# Patient Record
Sex: FEMALE | Race: WHITE | NOT HISPANIC OR LATINO | Employment: OTHER | URBAN - METROPOLITAN AREA
[De-identification: names, ages, dates, MRNs, and addresses within clinical notes are randomized per-mention and may not be internally consistent; named-entity substitution may affect disease eponyms.]

---

## 2017-01-17 ENCOUNTER — TELEPHONE (OUTPATIENT)
Dept: CARDIOLOGY | Facility: CLINIC | Age: 82
End: 2017-01-17

## 2017-01-17 NOTE — TELEPHONE ENCOUNTER
----- Message from Aniyah Juan sent at 1/16/2017 11:32 AM CST -----  Contact: Yuni Mathew - daughter      ----- Message -----     From: Mayte Nuñez     Sent: 1/16/2017   9:16 AM       To: [facility] Special Card Proc Clinical Support    Patient has a pacemaker appointment today that she needs to reschedule because there has been a death in the family.   Call daughter at [phone].                         sierra

## 2017-01-17 NOTE — TELEPHONE ENCOUNTER
Spoke with daughter, r/s MDT pacemaker check and 3 month follow-up visit to 1/23/2017 at [facility] beginning as 12:45 pm.  Daughter verbalized understanding, will check [facility] for further details.

## 2017-01-23 ENCOUNTER — OFFICE VISIT (OUTPATIENT)
Dept: CARDIOLOGY | Facility: CLINIC | Age: 82
End: 2017-01-23
Payer: MEDICARE

## 2017-01-23 ENCOUNTER — CLINICAL SUPPORT (OUTPATIENT)
Dept: CARDIOLOGY | Facility: CLINIC | Age: 82
End: 2017-01-23
Payer: MEDICARE

## 2017-01-23 VITALS
BODY MASS INDEX: 34.25 KG/M2 | HEIGHT: 64 IN | DIASTOLIC BLOOD PRESSURE: 60 MMHG | HEART RATE: 64 BPM | WEIGHT: 200.63 LBS | SYSTOLIC BLOOD PRESSURE: 164 MMHG

## 2017-01-23 DIAGNOSIS — I35.9 AORTIC VALVE DISORDER: Primary | ICD-10-CM

## 2017-01-23 DIAGNOSIS — I49.5 SINOATRIAL NODE DYSFUNCTION: ICD-10-CM

## 2017-01-23 DIAGNOSIS — I51.89 DIASTOLIC DYSFUNCTION: ICD-10-CM

## 2017-01-23 DIAGNOSIS — E78.5 HYPERLIPIDEMIA, UNSPECIFIED HYPERLIPIDEMIA TYPE: ICD-10-CM

## 2017-01-23 DIAGNOSIS — I27.20 PULMONARY HTN: ICD-10-CM

## 2017-01-23 DIAGNOSIS — Z95.0 CARDIAC PACEMAKER: ICD-10-CM

## 2017-01-23 DIAGNOSIS — R55 NEAR SYNCOPE: ICD-10-CM

## 2017-01-23 DIAGNOSIS — I10 ESSENTIAL HYPERTENSION: ICD-10-CM

## 2017-01-23 PROCEDURE — 3078F DIAST BP <80 MM HG: CPT | Mod: S$GLB,,, | Performed by: NURSE PRACTITIONER

## 2017-01-23 PROCEDURE — 99214 OFFICE O/P EST MOD 30 MIN: CPT | Mod: S$GLB,,, | Performed by: NURSE PRACTITIONER

## 2017-01-23 PROCEDURE — 1126F AMNT PAIN NOTED NONE PRSNT: CPT | Mod: S$GLB,,, | Performed by: NURSE PRACTITIONER

## 2017-01-23 PROCEDURE — 93280 PM DEVICE PROGR EVAL DUAL: CPT | Mod: 26,,, | Performed by: INTERNAL MEDICINE

## 2017-01-23 PROCEDURE — 1157F ADVNC CARE PLAN IN RCRD: CPT | Mod: S$GLB,,, | Performed by: NURSE PRACTITIONER

## 2017-01-23 PROCEDURE — 1159F MED LIST DOCD IN RCRD: CPT | Mod: S$GLB,,, | Performed by: NURSE PRACTITIONER

## 2017-01-23 PROCEDURE — 99999 PR PBB SHADOW E&M-EST. PATIENT-LVL III: CPT | Mod: PBBFAC,,, | Performed by: NURSE PRACTITIONER

## 2017-01-23 PROCEDURE — 99499 UNLISTED E&M SERVICE: CPT | Mod: S$GLB,,, | Performed by: NURSE PRACTITIONER

## 2017-01-23 PROCEDURE — 1160F RVW MEDS BY RX/DR IN RCRD: CPT | Mod: S$GLB,,, | Performed by: NURSE PRACTITIONER

## 2017-01-23 PROCEDURE — 3077F SYST BP >= 140 MM HG: CPT | Mod: S$GLB,,, | Performed by: NURSE PRACTITIONER

## 2017-01-23 NOTE — PROGRESS NOTES
Subjective:    Patient ID:  Diana Turner is a 83 y.o. female who presents for follow-up of Cardiac Valve Problem and Hypertension  Pacemaker    HPI   Ms Turner is 83 year old female with a PMhx of HTN, HLD, near syncope, SA node dysfunction, moderate to severe AS, PPM placement and CKD. She visits the clinic today for 3 month follow up. Patient has been doing well. Denies chest pain, shortness of breath, palpitations, syncope or dizziness. Vital signs are stable. Admitted to [facility] with near syncope 3 months ago. She has not experienced any more episode since that time. Vital signs are stable. PPM interrogated today. 2 D Echo on 9/11/2016 normal left ventricular systolic function (EF 55-60%), left ventricular diastolic dysfunction, moderate to severe AS and moderate AR.    Review of Systems   Constitution: Positive for weakness. Negative for chills, diaphoresis, fever, malaise/fatigue, weight gain and weight loss.   HENT: Positive for hearing loss. Negative for congestion and nosebleeds.    Eyes: Negative for blurred vision, double vision and pain.   Cardiovascular: Negative for chest pain, claudication, cyanosis, dyspnea on exertion, irregular heartbeat, leg swelling, near-syncope, orthopnea, palpitations, paroxysmal nocturnal dyspnea and syncope.   Respiratory: Negative for cough, hemoptysis, shortness of breath, sputum production and wheezing.    Endocrine: Negative for polyuria.   Hematologic/Lymphatic: Negative for bleeding problem. Does not bruise/bleed easily.   Skin: Negative for poor wound healing, rash and suspicious lesions.   Musculoskeletal: Negative for arthritis, back pain, falls, joint pain, joint swelling and neck pain.   Gastrointestinal: Negative for abdominal pain, heartburn, nausea and vomiting.   Genitourinary: Negative for bladder incontinence, dysuria, frequency and hematuria.   Neurological: Negative for difficulty with concentration, dizziness, focal weakness, light-headedness, loss of  balance, numbness, paresthesias, seizures, sensory change and tremors.   Psychiatric/Behavioral: Negative for depression, memory loss and substance abuse. The patient is not nervous/anxious.    Allergic/Immunologic: Negative for HIV exposure and hives.               Past Medical History   Diagnosis Date    Anemia     Aortic valve disorders     Arthritis     Benign hypertensive heart disease with heart failure(402.11)     Cardiac pacemaker 10/10/2013    Carotid artery occlusion     Chronic renal insufficiency, stage III (moderate) 10/10/2013    Depression     Hyperlipidemia     Hypertension     Kidney cysts      right US renal artery 9/2013    Obesity     Orbital mass     Pneumonia      as a child, required hospitalization    Right bundle branch block     Sinoatrial node dysfunction     Thyroid disease      goiter    Urinary incontinence      Past Surgical History   Procedure Laterality Date    Insert / replace / remove pacemaker  09/25/2008     [employer]TRONIC    Tonsillectomy      Appendectomy      Cholecystectomy      Knee surgery       right    Hysterectomy       RAHUL; benign reasons    Adenoidectomy      Hemorrhoid surgery       Family History   Problem Relation Age of Onset    Hypertension Mother     Kidney disease Mother     Heart disease Brother     Hyperlipidemia Brother     Hypertension Brother     Diabetes Cousin     Cancer Neg Hx     COPD Neg Hx     Stroke Neg Hx      Social History     Social History    Marital status:      Spouse name: N/A    Number of children: N/A    Years of education: N/A     Social History Main Topics    Smoking status: Never Smoker    Smokeless tobacco: Never Used    Alcohol use No    Drug use: No    Sexual activity: No     Other Topics Concern    Not on file     Social History Narrative     Review of patient's allergies indicates:   Allergen Reactions    Pravastatin Other (See Comments)    Sulfa (sulfonamide antibiotics) Swelling and  Other (See Comments)    Yeast, dried Other (See Comments)     Current Outpatient Prescriptions on File Prior to Visit   Medication Sig Dispense Refill    acetaminophen (TYLENOL) 500 MG tablet Take 500 mg by mouth every 6 (six) hours as needed.      allopurinol (ZYLOPRIM) 100 MG tablet TAKE 2 TABLETS (200 MG TOTAL) BY MOUTH ONCE DAILY. 180 tablet 3    aspirin 81 mg CpDR Take 1 tablet by mouth Daily.      atenolol (TENORMIN) 100 MG tablet Take 1 tablet (100 mg total) by mouth once daily. 90 tablet 3    calcitRIOL (ROCALTROL) 0.25 MCG Cap TAKE 1 CAPSULE (0.25 MCG TOTAL) BY MOUTH ONCE DAILY. 90 capsule 3    citalopram (CELEXA) 20 MG tablet Take 1 tablet (20 mg total) by mouth once daily. 30 tablet 6    diltiaZEM (CARDIZEM CD) 240 MG 24 hr capsule TAKE 1 CAPSULE BY MOUTH ONCE DAILY. 90 capsule 3    ezetimibe (ZETIA) 10 mg tablet Take 1 tablet (10 mg total) by mouth once daily. 90 tablet 3    furosemide (LASIX) 40 MG tablet TAKE 1 TABLET (40 MG TOTAL) BY MOUTH ONCE DAILY. 90 tablet 0    meclizine (ANTIVERT) 25 mg tablet TAKE 1 TABLET (25 MG TOTAL) BY MOUTH EVERY EVENING. 90 tablet 0    omega-3 fatty acids-vitamin E (FISH OIL) 1,000 mg Cap Take 1 capsule by mouth Twice daily.      senna (SENOKOT) 8.6 mg tablet Take 1 tablet by mouth once daily.       No current facility-administered medications on file prior to visit.      .  Objective:    Physical Exam   Constitutional: She is oriented to person, place, and time. She appears well-developed and well-nourished.   HENT:   Head: Normocephalic and atraumatic.   Eyes: Pupils are equal, round, and reactive to light.   Neck: Normal range of motion. Neck supple. No JVD present.   Cardiovascular: Exam reveals no gallop and no friction rub.    Murmur heard.   Harsh midsystolic murmur is present  at the upper right sternal border radiating to the neck  Pulmonary/Chest: Effort normal and breath sounds normal. No respiratory distress. She has no wheezes. She has no rales.    Abdominal: Soft. Bowel sounds are normal. She exhibits no distension and no mass. There is no tenderness. There is no rebound and no guarding.   Musculoskeletal: Normal range of motion. She exhibits no edema, tenderness or deformity.   Neurological: She is alert and oriented to person, place, and time.   Skin: Skin is warm and dry.   Psychiatric: She has a normal mood and affect. Her behavior is normal. Thought content normal.   Nursing note and vitals reviewed.    Lab Results   Component Value Date    CHOL 179 09/11/2016    CHOL 198 07/18/2016    CHOL 205 (H) 12/01/2015     Lab Results   Component Value Date    HDL 32 (L) 09/11/2016    HDL 42 07/18/2016    HDL 42 12/01/2015     Lab Results   Component Value Date    LDLCALC 129.4 09/11/2016    LDLCALC 141.0 07/18/2016    LDLCALC 145.8 12/01/2015     Lab Results   Component Value Date    TRIG 88 09/11/2016    TRIG 75 07/18/2016    TRIG 86 12/01/2015     Lab Results   Component Value Date    CHOLHDL 17.9 (L) 09/11/2016    CHOLHDL 21.2 07/18/2016    CHOLHDL 20.5 12/01/2015       Chemistry        Component Value Date/Time     09/11/2016 0332    K 4.2 09/11/2016 0332     09/11/2016 0332    CO2 26 09/11/2016 0332    BUN 31 (H) 09/11/2016 0332    CREATININE 1.6 (H) 09/11/2016 0332     09/11/2016 0332        Component Value Date/Time    CALCIUM 9.1 09/11/2016 0332    ALKPHOS 77 09/11/2016 0332    AST 14 09/11/2016 0332    ALT 12 09/11/2016 0332    BILITOT 0.3 09/11/2016 0332          Lab Results   Component Value Date    TSH 0.507 09/11/2016     Lab Results   Component Value Date    INR 1.0 09/10/2016    INR 1.0 12/13/2014    INR 1.1 12/02/2014     Lab Results   Component Value Date    WBC 7.81 09/11/2016    HGB 10.7 (L) 09/11/2016    HCT 32.1 (L) 09/11/2016     (H) 09/11/2016     09/11/2016         Assessment:       1. Aortic valve disorder    2. Pulmonary HTN    3. Near syncope    4. Essential hypertension    5. Hyperlipidemia,  unspecified hyperlipidemia type    6. Obesity, Class II, BMI 35-39.9, with comorbidity    7. Diastolic dysfunction    8. Cardiac pacemaker        Patient doing well. No sign of myocardial ischemia or decompensated CHF.  Vital signs area stable.   No syncope episodes over the last 3 months.     Plan:       Will continue current medications and treatment plan  Low sodium diet   Risk modification  Fall precautions   Folow up in clinic in 6 months with Dr Browne with CMP and Lipids before visit

## 2017-01-23 NOTE — MR AVS SNAPSHOT
[facility]  [address] [facility] [address]  Phone: [phone]  Fax: [phone]                  Diana Turner   2017 1:00 PM   Office Visit    Description:  Female : 1933   Provider:  Manpreet Birmingham NP   Department:  [facility]           Diagnoses this Visit        Comments    Hyperlipidemia, unspecified hyperlipidemia type    -  Primary     Pulmonary HTN         Near syncope         Essential hypertension         Aortic valve disorder         Obesity, Class II, BMI 35-39.9, with comorbidity         Diastolic dysfunction         Cardiac pacemaker                To Do List           Future Appointments        Provider Department Dept Phone    2017 3:00 PM PAC[facility]AKERCLINIC, [facility] Cardiology [phone]    2017 12:30 PM [facility] - Ophthalmology [phone]    2017 1:00 PM Mj Burton MD [facility] Ophthalmology [phone]      Goals (5 Years of Data)     None      Follow-Up and Disposition     Return in about 6 months (around 2017) for Dr Reed .      [facility] On Call     [facility] On Call Nurse Care Line - 24/7 Assistance  Registered nurses in the [facility] On Call Center provide clinical advisement, health education, appointment booking, and other advisory services.  Call for this free service at [phone].             Medications           Message regarding Medications     Verify the changes and/or additions to your medication regime listed below are the same as discussed with your clinician today.  If any of these changes or additions are incorrect, please notify your healthcare provider.             Verify that the below list of medications is an accurate representation of the medications you are currently taking.  If none reported, the list may be blank. If incorrect, please contact your healthcare provider. Carry this list with you in case of emergency.           Current  "Medications     acetaminophen (TYLENOL) 500 MG tablet Take 500 mg by mouth every 6 (six) hours as needed.    allopurinol (ZYLOPRIM) 100 MG tablet TAKE 2 TABLETS (200 MG TOTAL) BY MOUTH ONCE DAILY.    aspirin 81 mg CpDR Take 1 tablet by mouth Daily.    atenolol (TENORMIN) 100 MG tablet Take 1 tablet (100 mg total) by mouth once daily.    calcitRIOL (ROCALTROL) 0.25 MCG Cap TAKE 1 CAPSULE (0.25 MCG TOTAL) BY MOUTH ONCE DAILY.    citalopram (CELEXA) 20 MG tablet Take 1 tablet (20 mg total) by mouth once daily.    diltiaZEM (CARDIZEM CD) 240 MG 24 hr capsule TAKE 1 CAPSULE BY MOUTH ONCE DAILY.    ezetimibe (ZETIA) 10 mg tablet Take 1 tablet (10 mg total) by mouth once daily.    FLUTICASONE PROPIONATE (CLARISPRAY NASL) by Nasal route.    furosemide (LASIX) 40 MG tablet TAKE 1 TABLET (40 MG TOTAL) BY MOUTH ONCE DAILY.    meclizine (ANTIVERT) 25 mg tablet TAKE 1 TABLET (25 MG TOTAL) BY MOUTH EVERY EVENING.    omega-3 fatty acids-vitamin E (FISH OIL) 1,000 mg Cap Take 1 capsule by mouth Twice daily.    senna (SENOKOT) 8.6 mg tablet Take 1 tablet by mouth once daily.           Clinical Reference Information           Vital Signs - Last Recorded  Most recent update: 1/23/2017  1:20 PM by Manpreet Birmingham NP    BP Pulse Ht Wt BMI    (!) 164/60 64 5' 4" (1.626 m) 91 kg (200 lb 9.9 oz) 34.44 kg/m2      Blood Pressure          Most Recent Value    BP  (!)  164/60 [BP systolic >160, pts arm hurt too much to inflate cuff more]      Allergies as of 1/23/2017     Pravastatin    Sulfa (Sulfonamide Antibiotics)    Yeast, Dried      Immunizations Administered on Date of Encounter - 1/23/2017     None      Orders Placed During Today's Visit     Future Labs/Procedures Expected by Expires    Comprehensive metabolic panel  7/25/2017 (Approximate) 1/23/2018    Lipid panel  7/25/2017 (Approximate) 1/23/2018      "

## 2017-02-13 ENCOUNTER — OFFICE VISIT (OUTPATIENT)
Dept: URGENT CARE | Facility: CLINIC | Age: 82
End: 2017-02-13
Payer: MEDICARE

## 2017-02-13 VITALS
OXYGEN SATURATION: 96 % | HEART RATE: 69 BPM | BODY MASS INDEX: 33.34 KG/M2 | SYSTOLIC BLOOD PRESSURE: 170 MMHG | TEMPERATURE: 98 F | HEIGHT: 64 IN | DIASTOLIC BLOOD PRESSURE: 72 MMHG | WEIGHT: 195.31 LBS

## 2017-02-13 DIAGNOSIS — N39.0 UTI (URINARY TRACT INFECTION), UNCOMPLICATED: ICD-10-CM

## 2017-02-13 DIAGNOSIS — R30.0 DYSURIA: Primary | ICD-10-CM

## 2017-02-13 PROCEDURE — 99999 PR PBB SHADOW E&M-EST. PATIENT-LVL IV: CPT | Mod: PBBFAC,,,

## 2017-02-13 PROCEDURE — 1160F RVW MEDS BY RX/DR IN RCRD: CPT | Mod: S$GLB,,, | Performed by: NURSE PRACTITIONER

## 2017-02-13 PROCEDURE — 87077 CULTURE AEROBIC IDENTIFY: CPT

## 2017-02-13 PROCEDURE — 99213 OFFICE O/P EST LOW 20 MIN: CPT | Mod: S$GLB,,, | Performed by: NURSE PRACTITIONER

## 2017-02-13 PROCEDURE — 87186 SC STD MICRODIL/AGAR DIL: CPT

## 2017-02-13 PROCEDURE — 81000 URINALYSIS NONAUTO W/SCOPE: CPT

## 2017-02-13 PROCEDURE — 87086 URINE CULTURE/COLONY COUNT: CPT

## 2017-02-13 PROCEDURE — 87088 URINE BACTERIA CULTURE: CPT

## 2017-02-13 PROCEDURE — 3077F SYST BP >= 140 MM HG: CPT | Mod: S$GLB,,, | Performed by: NURSE PRACTITIONER

## 2017-02-13 PROCEDURE — 3078F DIAST BP <80 MM HG: CPT | Mod: S$GLB,,, | Performed by: NURSE PRACTITIONER

## 2017-02-13 PROCEDURE — 1157F ADVNC CARE PLAN IN RCRD: CPT | Mod: S$GLB,,, | Performed by: NURSE PRACTITIONER

## 2017-02-13 PROCEDURE — 1159F MED LIST DOCD IN RCRD: CPT | Mod: S$GLB,,, | Performed by: NURSE PRACTITIONER

## 2017-02-13 PROCEDURE — 1126F AMNT PAIN NOTED NONE PRSNT: CPT | Mod: S$GLB,,, | Performed by: NURSE PRACTITIONER

## 2017-02-13 PROCEDURE — 99499 UNLISTED E&M SERVICE: CPT | Mod: S$GLB,,, | Performed by: NURSE PRACTITIONER

## 2017-02-13 RX ORDER — CEPHALEXIN 250 MG/1
250 CAPSULE ORAL EVERY 12 HOURS
Qty: 6 CAPSULE | Refills: 0 | Status: SHIPPED | OUTPATIENT
Start: 2017-02-13 | End: 2017-02-16

## 2017-02-13 RX ORDER — PHENAZOPYRIDINE HYDROCHLORIDE 200 MG/1
200 TABLET, FILM COATED ORAL
Qty: 6 TABLET | Refills: 0 | Status: SHIPPED | OUTPATIENT
Start: 2017-02-13 | End: 2017-02-15

## 2017-02-13 NOTE — MR AVS SNAPSHOT
[facility] - Urgent Care  [address]  [facility] [address]  Phone: [phone]  Fax: [phone]                  Diana Turner   2017 2:20 PM   Office Visit    Description:  Female : 1933   Provider:  URGENT CARE, [facility]   Department:  [facility] - Urgent Care           Reason for Visit     Urinary Tract Infection           Diagnoses this Visit        Comments    Dysuria    -  Primary     UTI (urinary tract infection), uncomplicated                To Do List           Future Appointments        Provider Department Dept Phone    2017 1:30 PM [facility]AKER CLINIC, [facility] - Arrhythmia Procedures [phone]    2017 12:30 PM [facility]DS, [facility] - Ophthalmology [phone]    2017 1:00 PM Mj Burton MD [facility] Ophthalmology [phone]      Goals (5 Years of Data)     None       These Medications        Disp Refills Start End    cephALEXin (KEFLEX) 250 MG capsule 6 capsule 0 2017    Take 1 capsule (250 mg total) by mouth every 12 (twelve) hours. - Oral    Pharmacy: [facility]/pharmacy #[id number] - [place] [address] AT [facility] Ph #: [phone]       phenazopyridine (PYRIDIUM) 200 MG tablet 6 tablet 0 2017 2/15/2017    Take 1 tablet (200 mg total) by mouth 3 (three) times daily with meals. - Oral    Pharmacy: [facility]/pharmacy #[id number] - [place] [address] AT [facility] Ph #: [phone]         [facility] On Call     [facility] On Call Nurse Care Line -  Assistance  Registered nurses in the [facility] On Call Center provide clinical advisement, health education, appointment booking, and other advisory services.  Call for this free service at [phone].             Medications           Message regarding Medications     Verify the changes and/or additions to your medication regime listed below are the same as discussed with your  clinician today.  If any of these changes or additions are incorrect, please notify your healthcare provider.        START taking these NEW medications        Refills    cephALEXin (KEFLEX) 250 MG capsule 0    Sig: Take 1 capsule (250 mg total) by mouth every 12 (twelve) hours.    Class: Normal    Route: Oral    phenazopyridine (PYRIDIUM) 200 MG tablet 0    Sig: Take 1 tablet (200 mg total) by mouth 3 (three) times daily with meals.    Class: Normal    Route: Oral      STOP taking these medications     PHENAZOPYRIDINE HCL (AZO ORAL) Take by mouth.           Verify that the below list of medications is an accurate representation of the medications you are currently taking.  If none reported, the list may be blank. If incorrect, please contact your healthcare provider. Carry this list with you in case of emergency.           Current Medications     acetaminophen (TYLENOL) 500 MG tablet Take 500 mg by mouth every 6 (six) hours as needed.    allopurinol (ZYLOPRIM) 100 MG tablet TAKE 2 TABLETS (200 MG TOTAL) BY MOUTH ONCE DAILY.    aspirin 81 mg CpDR Take 1 tablet by mouth Daily.    atenolol (TENORMIN) 100 MG tablet Take 1 tablet (100 mg total) by mouth once daily.    calcitRIOL (ROCALTROL) 0.25 MCG Cap TAKE 1 CAPSULE (0.25 MCG TOTAL) BY MOUTH ONCE DAILY.    citalopram (CELEXA) 20 MG tablet Take 1 tablet (20 mg total) by mouth once daily.    diltiaZEM (CARDIZEM CD) 240 MG 24 hr capsule TAKE 1 CAPSULE BY MOUTH ONCE DAILY.    ezetimibe (ZETIA) 10 mg tablet Take 1 tablet (10 mg total) by mouth once daily.    FLUTICASONE PROPIONATE (CLARISPRAY NASL) by Nasal route.    furosemide (LASIX) 40 MG tablet TAKE 1 TABLET (40 MG TOTAL) BY MOUTH ONCE DAILY.    meclizine (ANTIVERT) 25 mg tablet TAKE 1 TABLET (25 MG TOTAL) BY MOUTH EVERY EVENING.    omega-3 fatty acids-vitamin E (FISH OIL) 1,000 mg Cap Take 1 capsule by mouth Twice daily.    senna (SENOKOT) 8.6 mg tablet Take 1 tablet by mouth once daily.    cephALEXin (KEFLEX) 250 MG  "capsule Take 1 capsule (250 mg total) by mouth every 12 (twelve) hours.    phenazopyridine (PYRIDIUM) 200 MG tablet Take 1 tablet (200 mg total) by mouth 3 (three) times daily with meals.           Clinical Reference Information           Your Vitals Were     BP Pulse Temp Height    170/72 (BP Location: Right arm, Patient Position: Sitting, BP Method: Manual) 69 97.8 °F (36.6 °C) (Tympanic) 5' 4" (1.626 m)    Weight SpO2 BMI    88.6 kg (195 lb 5.2 oz) 96% 33.53 kg/m2      Blood Pressure          Most Recent Value    BP  (!)  170/72      Allergies as of 2/13/2017     Pravastatin    Sulfa (Sulfonamide Antibiotics)    Yeast, Dried      Immunizations Administered on Date of Encounter - 2/13/2017     None      Orders Placed During Today's Visit      Normal Orders This Visit    CULTURE, URINE     Urinalysis       Instructions    Plan:  Lab work  UA, C&S   Consult Nephrology  Advise increase p.o. fluids--of water/juice & rest  Meds: Keflex and Pyridium  Practice good handwashing.  See PCP or go to ER if nausea, vomiting, fever, increased back pain, or fail to improve with treatment.  AVS provided and reviewed with patient including supportive care, follow up, and red flag symptoms.   Patient verbalizes understanding and agrees with treatment plan. Discharged from Urgent Care in stable condition.         Language Assistance Services     ATTENTION: Language assistance services are available, free of charge. Please call [phone].      ATENCIÓN: Si habla español, tiene a reid disposición servicios gratuitos de asistencia lingüística. Llame al [phone].     PETER Ý: N?u b?n nói Ti?ng Vi?t, có các d?ch v? h? tr? ngôn ng? mi?n phí dành cho b?n. G?i s? [phone].         [facility] complies with applicable Federal civil rights laws and does not discriminate on the basis of race, color, national origin, age, disability, or sex.        "

## 2017-02-13 NOTE — PROGRESS NOTES
Chief complaint/reason for visit:  dysuria, urinary frequency    History of present illness:  83-year-old female with daughter complains of dysuria, urinary frequency & abdominal pain onset 3  days ago.   Patient denies any back pain, nausea, vomiting, fever & vaginal d/c.  Patient tried over-the-counter medications with little relief. Daughter admits patient seen couple of weeks ago for UTI. Daughter admits Nephrologist/ Dr Benson was going to place patient on Keflex 6 months ago. Discussed with patient & daughter not able to detect UTI with urine test stripe due to OTC AZO.       Past Medical History   Diagnosis Date    Anemia     Aortic valve disorders     Arthritis     Benign hypertensive heart disease with heart failure(402.11)     Cardiac pacemaker 10/10/2013    Carotid artery occlusion     Chronic renal insufficiency, stage III (moderate) 10/10/2013    Depression     Hyperlipidemia     Hypertension     Kidney cysts      right US renal artery 9/2013    Obesity     Orbital mass     Pneumonia      as a child, required hospitalization    Right bundle branch block     Sinoatrial node dysfunction     Thyroid disease      goiter    Urinary incontinence        Past Surgical History   Procedure Laterality Date    Insert / replace / remove pacemaker  09/25/2008     [employer]    Tonsillectomy      Appendectomy      Cholecystectomy      Knee surgery       right    Hysterectomy       RAHUL; benign reasons    Adenoidectomy      Hemorrhoid surgery         Social History     Social History    Marital status:      Spouse name: N/A    Number of children: N/A    Years of education: N/A     Occupational History    Not on file.     Social History Main Topics    Smoking status: Never Smoker    Smokeless tobacco: Never Used    Alcohol use No    Drug use: No    Sexual activity: No     Other Topics Concern    Not on file     Social History Narrative         ROS:  Gen.: Denies fatigue, weight  loss  Skin:  Denies  no rashes, itching or changes in skin color or texture  HEENT: Denies headache, nasal congestion, sneezing  Nodes: No masses or lesions  Chest: Denies cyanosis, wheezing, cough and sputum production  Cardiovascular: Denies chest pain, shortness of breath  Abdomen: Reports slight abdominal pain  Urinary: Reports dysuria   Musculoskeletal: no joint stiffness, swelling. Denies back pain  Neurologic: History of seizures, paralysis, alteration of gait or coordination  Psychiatric: Denies mood swings, depression or suicidal    PE:  Appearance: Well-nourished, well-developed, in mild distress, [place]  Skin: No masses, rashes or lesions  HEENT: turbinates pink, Mucus membranes okay, TMs clear bilateral   Chest: Lungs clear to auscultation.  Cardiovascular: Regular rate and rhythm.  Abdomen: Soft with minimal supra pubic tenderness, with active bowel sounds, no CVA tenderness  Musculoskeletal: Motor: 5/5 strength major flexors/extensors.  Neurologic: No sensory deficits. Gait and posture: ambulates slowly, No cerebellar signs.   Mental status: Patient awake, alert and oriented    Plan:  Lab work  UA, C&S   Consult Nephrology  Advise increase p.o. fluids--of water/juice & rest  Meds: Keflex and Pyridium  Practice good handwashing.  See PCP or go to ER if nausea, vomiting, fever, increased back pain, or fail to improve with treatment.  AVS provided and reviewed with patient including supportive care, follow up, and red flag symptoms.   Patient verbalizes understanding and agrees with treatment plan. Discharged from Urgent Care in stable condition.    Diagnosis:  Dysuria / UTI  Hypertension  Chronic kidney disease

## 2017-02-13 NOTE — PATIENT INSTRUCTIONS
Plan:  Lab work  UA, C&S   Consult Nephrology  Advise increase p.o. fluids--of water/juice & rest  Meds: Keflex and Pyridium  Practice good handwashing.  See PCP or go to ER if nausea, vomiting, fever, increased back pain, or fail to improve with treatment.  AVS provided and reviewed with patient including supportive care, follow up, and red flag symptoms.   Patient verbalizes understanding and agrees with treatment plan. Discharged from Urgent Care in stable condition.

## 2017-02-14 LAB
BACTERIA #/AREA URNS HPF: ABNORMAL /HPF
BILIRUB UR QL STRIP: ABNORMAL
CLARITY UR: CLEAR
COLOR UR: YELLOW
GLUCOSE UR QL STRIP: ABNORMAL
HGB UR QL STRIP: ABNORMAL
HYALINE CASTS #/AREA URNS LPF: 0 /LPF
KETONES UR QL STRIP: NEGATIVE
LEUKOCYTE ESTERASE UR QL STRIP: ABNORMAL
MICROSCOPIC COMMENT: ABNORMAL
NITRITE UR QL STRIP: POSITIVE
PH UR STRIP: 6 [PH] (ref 5–8)
PROT UR QL STRIP: ABNORMAL
RBC #/AREA URNS HPF: 5 /HPF (ref 0–4)
SP GR UR STRIP: 1.01 (ref 1–1.03)
SQUAMOUS #/AREA URNS HPF: 3 /HPF
URN SPEC COLLECT METH UR: ABNORMAL
UROBILINOGEN UR STRIP-ACNC: ABNORMAL EU/DL
WBC #/AREA URNS HPF: >100 /HPF (ref 0–5)
WBC CLUMPS URNS QL MICRO: ABNORMAL

## 2017-02-16 ENCOUNTER — PATIENT MESSAGE (OUTPATIENT)
Dept: NEPHROLOGY | Facility: CLINIC | Age: 82
End: 2017-02-16

## 2017-02-17 LAB — BACTERIA UR CULT: NORMAL

## 2017-02-21 RX ORDER — CALCITRIOL 0.25 UG/1
CAPSULE ORAL
Qty: 90 CAPSULE | Refills: 2 | Status: SHIPPED | OUTPATIENT
Start: 2017-02-21 | End: 2017-03-21 | Stop reason: SDUPTHER

## 2017-02-21 RX ORDER — ALLOPURINOL 100 MG/1
TABLET ORAL
Qty: 180 TABLET | Refills: 0 | Status: SHIPPED | OUTPATIENT
Start: 2017-02-21 | End: 2017-06-22 | Stop reason: SDUPTHER

## 2017-02-26 ENCOUNTER — TELEPHONE (OUTPATIENT)
Dept: URGENT CARE | Facility: CLINIC | Age: 82
End: 2017-02-26

## 2017-02-26 NOTE — TELEPHONE ENCOUNTER
----- Message from Elli Galindo NP sent at 2/24/2017  3:46 PM CST -----  Advise urine culture shows E Coli, need to see Nephrology

## 2017-02-26 NOTE — TELEPHONE ENCOUNTER
Called and spoke to pt's daughter regarding the UA Culture results. She stated that the pt is feeling a little better. Instructed for her to keep her appt with Nephrology. She stated an understanding.

## 2017-03-13 ENCOUNTER — LAB VISIT (OUTPATIENT)
Dept: LAB | Facility: HOSPITAL | Age: 82
End: 2017-03-13
Attending: INTERNAL MEDICINE
Payer: MEDICARE

## 2017-03-13 DIAGNOSIS — E87.5 HYPERKALEMIA: ICD-10-CM

## 2017-03-13 DIAGNOSIS — R60.0 LOCALIZED EDEMA: ICD-10-CM

## 2017-03-13 DIAGNOSIS — E21.3 HPTH (HYPERPARATHYROIDISM): ICD-10-CM

## 2017-03-13 DIAGNOSIS — N18.30 CKD (CHRONIC KIDNEY DISEASE) STAGE 3, GFR 30-59 ML/MIN: ICD-10-CM

## 2017-03-13 DIAGNOSIS — I10 ESSENTIAL HYPERTENSION: ICD-10-CM

## 2017-03-13 DIAGNOSIS — D63.1 ANEMIA ASSOCIATED WITH STAGE 3 CHRONIC RENAL FAILURE: ICD-10-CM

## 2017-03-13 DIAGNOSIS — M1A.30X0 CHRONIC GOUT DUE TO RENAL IMPAIRMENT WITHOUT TOPHUS, UNSPECIFIED SITE: ICD-10-CM

## 2017-03-13 DIAGNOSIS — N18.30 ANEMIA ASSOCIATED WITH STAGE 3 CHRONIC RENAL FAILURE: ICD-10-CM

## 2017-03-13 DIAGNOSIS — N39.0 RECURRENT UTI: ICD-10-CM

## 2017-03-13 LAB

## 2017-03-13 PROCEDURE — 84156 ASSAY OF PROTEIN URINE: CPT

## 2017-03-13 PROCEDURE — 81003 URINALYSIS AUTO W/O SCOPE: CPT

## 2017-03-14 LAB
CREAT UR-MCNC: 103 MG/DL
PROT UR-MCNC: 15 MG/DL
PROT/CREAT RATIO, UR: 0.15

## 2017-03-21 RX ORDER — CALCITRIOL 0.25 UG/1
CAPSULE ORAL
Qty: 90 CAPSULE | Refills: 3 | Status: SHIPPED | OUTPATIENT
Start: 2017-03-21

## 2017-04-19 ENCOUNTER — OFFICE VISIT (OUTPATIENT)
Dept: NEPHROLOGY | Facility: CLINIC | Age: 82
End: 2017-04-19
Payer: MEDICARE

## 2017-04-19 VITALS
DIASTOLIC BLOOD PRESSURE: 52 MMHG | SYSTOLIC BLOOD PRESSURE: 140 MMHG | HEIGHT: 64 IN | HEART RATE: 80 BPM | BODY MASS INDEX: 33.27 KG/M2 | WEIGHT: 194.88 LBS

## 2017-04-19 DIAGNOSIS — E87.5 HYPERKALEMIA: ICD-10-CM

## 2017-04-19 DIAGNOSIS — N18.30 ANEMIA ASSOCIATED WITH STAGE 3 CHRONIC RENAL FAILURE: ICD-10-CM

## 2017-04-19 DIAGNOSIS — N18.9 ANEMIA ASSOCIATED WITH CHRONIC RENAL FAILURE: ICD-10-CM

## 2017-04-19 DIAGNOSIS — M1A.30X0 CHRONIC GOUT DUE TO RENAL IMPAIRMENT WITHOUT TOPHUS, UNSPECIFIED SITE: ICD-10-CM

## 2017-04-19 DIAGNOSIS — I10 ESSENTIAL HYPERTENSION: ICD-10-CM

## 2017-04-19 DIAGNOSIS — N39.0 RECURRENT UTI: ICD-10-CM

## 2017-04-19 DIAGNOSIS — D63.1 ANEMIA ASSOCIATED WITH CHRONIC RENAL FAILURE: ICD-10-CM

## 2017-04-19 DIAGNOSIS — D63.1 ANEMIA ASSOCIATED WITH STAGE 3 CHRONIC RENAL FAILURE: ICD-10-CM

## 2017-04-19 DIAGNOSIS — N18.30 CKD (CHRONIC KIDNEY DISEASE) STAGE 3, GFR 30-59 ML/MIN: ICD-10-CM

## 2017-04-19 DIAGNOSIS — E21.3 HPTH (HYPERPARATHYROIDISM): ICD-10-CM

## 2017-04-19 DIAGNOSIS — R60.0 LOCALIZED EDEMA: ICD-10-CM

## 2017-04-19 DIAGNOSIS — N18.4 CKD (CHRONIC KIDNEY DISEASE), STAGE 4 (SEVERE): Primary | ICD-10-CM

## 2017-04-19 PROCEDURE — 99214 OFFICE O/P EST MOD 30 MIN: CPT | Mod: S$GLB,,, | Performed by: INTERNAL MEDICINE

## 2017-04-19 PROCEDURE — 1160F RVW MEDS BY RX/DR IN RCRD: CPT | Mod: S$GLB,,, | Performed by: INTERNAL MEDICINE

## 2017-04-19 PROCEDURE — 1126F AMNT PAIN NOTED NONE PRSNT: CPT | Mod: S$GLB,,, | Performed by: INTERNAL MEDICINE

## 2017-04-19 PROCEDURE — 3077F SYST BP >= 140 MM HG: CPT | Mod: S$GLB,,, | Performed by: INTERNAL MEDICINE

## 2017-04-19 PROCEDURE — 99999 PR PBB SHADOW E&M-EST. PATIENT-LVL III: CPT | Mod: PBBFAC,,, | Performed by: INTERNAL MEDICINE

## 2017-04-19 PROCEDURE — 1159F MED LIST DOCD IN RCRD: CPT | Mod: S$GLB,,, | Performed by: INTERNAL MEDICINE

## 2017-04-19 PROCEDURE — 99499 UNLISTED E&M SERVICE: CPT | Mod: S$GLB,,, | Performed by: INTERNAL MEDICINE

## 2017-04-19 PROCEDURE — 3078F DIAST BP <80 MM HG: CPT | Mod: S$GLB,,, | Performed by: INTERNAL MEDICINE

## 2017-04-19 RX ORDER — DIPHENHYDRAMINE HCL 25 MG
25 CAPSULE ORAL
Status: ACTIVE | OUTPATIENT
Start: 2017-04-19

## 2017-04-19 RX ORDER — DIPHENHYDRAMINE HCL 25 MG
25 CAPSULE ORAL
Status: CANCELLED
Start: 2017-04-19 | End: 2017-04-19

## 2017-04-19 RX ORDER — SODIUM CHLORIDE 0.9 % (FLUSH) 0.9 %
10 SYRINGE (ML) INJECTION
Status: CANCELLED | OUTPATIENT
Start: 2017-04-19

## 2017-04-19 RX ORDER — SODIUM CHLORIDE 0.9 % (FLUSH) 0.9 %
10 SYRINGE (ML) INJECTION
Status: ACTIVE | OUTPATIENT
Start: 2017-04-19

## 2017-04-19 RX ORDER — HEPARIN 100 UNIT/ML
500 SYRINGE INTRAVENOUS
Status: DISCONTINUED | OUTPATIENT
Start: 2017-04-19 | End: 2018-08-30

## 2017-04-19 NOTE — MR AVS SNAPSHOT
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 tophus, unspecified site         HPTH (hyperparathyroidism)         Hyperkalemia         Recurrent UTI         Anemia associated with chronic renal failure         CKD (chronic kidney disease) stage 3, GFR 30-59 ml/min         Anemia associated with stage 3 chronic renal failure                To Do List           Future Appointments        Provider Department Dept Phone    2017 1:30 PM [facility] Arr[facility]ia Procedures [phone]    2017 12:30 PM [facility] - Ophthalmology [phone]    2017 1:00 PM Mj Burton MD [facility] Ophthalmology [phone]      Goals (5 Years of Data)     None      Follow-Up and Disposition     Return in about 6 months (around 10/19/2017).    Follow-up and Disposition History      [facility]     [facility] -  Assistance  Unless otherwise directed by your provider, please contact [facility], our nurse care line that is available for  assistance.     Registered nurses in the [facility] provide: appointment scheduling, clinical advisement, health education, and other advisory services.  Call: [phone] (toll free)               Medications           Message regarding Medications     Verify the changes and/or additions to your medication regime listed below are the same  as discussed with your clinician today.  If any of these changes or additions are incorrect, please notify your healthcare provider.        These medications were administered today        Dose Freq    heparin, porcine (PF) 100 unit/mL injection flush 500 Units 500 Units As needed (PRN)    Sig: Inject 5 mLs (500 Units total) into the vein as needed (Flush lines as needed.).    Class: Normal    Route: Intravenous    sodium chloride 0.9% flush 10 mL 10 mL As needed (PRN)    Sig: Inject 10 mLs into the vein as needed for Line Care.    Class: Normal    Route: Intravenous    diphenhydrAMINE capsule 25 mg 25 mg Clinic/HOD 1 time    Sig: Take 1 each (25 mg total) by mouth one time.    Class: Normal    Route: Oral    ferumoxytol (FERAHEME) 510 mg in dextrose 5 % 100 mL IVPB 510 mg Once    Sig: Inject 510 mg into the vein once.    Class: Normal    Route: Intravenous    Non-formulary Exception Code: Defer to pharmacy           Verify that the below list of medications is an accurate representation of the medications you are currently taking.  If none reported, the list may be blank. If incorrect, please contact your healthcare provider. Carry this list with you in case of emergency.           Current Medications     acetaminophen (TYLENOL) 500 MG tablet Take 500 mg by mouth every 6 (six) hours as needed.    allopurinol (ZYLOPRIM) 100 MG tablet TAKE 2 TABLETS (200 MG TOTAL) BY MOUTH ONCE DAILY.    aspirin 81 mg CpDR Take 1 tablet by mouth Daily.    atenolol (TENORMIN) 100 MG tablet Take 1 tablet (100 mg total) by mouth once daily.    calcitRIOL (ROCALTROL) 0.25 MCG Cap TAKE 1 CAPSULE (0.25 MCG TOTAL) BY MOUTH ONCE DAILY.    citalopram (CELEXA) 20 MG tablet Take 1 tablet (20 mg total) by mouth once daily.    diltiaZEM (CARDIZEM CD) 240 MG 24 hr capsule TAKE 1 CAPSULE BY MOUTH ONCE DAILY.    ezetimibe (ZETIA) 10 mg tablet Take 1 tablet (10 mg total) by mouth once daily.    FLUTICASONE PROPIONATE (CLARISPRAY NASL) by Nasal route.  "   furosemide (LASIX) 40 MG tablet TAKE 1 TABLET (40 MG TOTAL) BY MOUTH ONCE DAILY.    meclizine (ANTIVERT) 25 mg tablet TAKE 1 TABLET (25 MG TOTAL) BY MOUTH EVERY EVENING.    omega-3 fatty acids-vitamin E (FISH OIL) 1,000 mg Cap Take 1 capsule by mouth Twice daily.    senna (SENOKOT) 8.6 mg tablet Take 1 tablet by mouth once daily.           Clinical Reference Information           Your Vitals Were     BP Pulse Height Weight BMI    140/52 80 5' 4" (1.626 m) 88.4 kg (194 lb 14.2 oz) 33.45 kg/m2      Blood Pressure          Most Recent Value    BP  (!)  140/52      Allergies as of 4/19/2017     Pravastatin    Sulfa (Sulfonamide Antibiotics)    Yeast, Dried      Immunizations Administered on Date of Encounter - 4/19/2017     None      Instructions    .  Chronic kidney disease stage IV: avoid nonsteroidals; hypertensive nephropathy; stable ; GFR 24%; vitamin D level is 31, PTH level is normal    2.  Hypertension much better controlled at home needs more monitoring at home     3.  Edema with hyponatremia: stable on Lasix    4.  Anemia: s/p  one dose of IV iron; reorder IV iron     5.  Right Tennis Elbow tendonitis: no more steroids ( made her have psychosis )    6.  Chronic gout status post acute flareup in 2014 : uric acid is better last time checked was in July 2016; allopurinol  200 mg once a day    7. Arthritis : no NSAIDS ; try DMSO with or without OTC hydrocortisone 1%       8. Anxiety : watch for drowsiness and while driving ; watch on celexa ( started 2014 )     9. UTI :  recurrent ; CB juice and other instructions given ; recheck UA and consider prophylactic Antibiotics if needed ; if she has anymore serious UTI will place her on cipro alternating with Keflex prophylactic antibiotics ; no serious UTI in the last 2 year       Language Assistance Services     ATTENTION: Language assistance services are available, free of charge. Please call [phone].      ATENCIÓN: Si maurisio spence a reid disposición " servicios gratuitos de asistencia lingüística. Fermín guzman [phone].     PETER Ý: N?u b?n nói Ti?ng Vi?t, có các d?ch v? h? tr? ngôn ng? mi?n phí dành cho b?n. G?i s? [phone].         [facility] complies with applicable Federal civil rights laws and does not discriminate on the basis of race, color, national origin, age, disability, or sex.

## 2017-04-19 NOTE — PROGRESS NOTES
"Subjective:       Patient ID: Diana Turner is a 83 y.o. White female who presents for follow up  evaluation of Chronic Kidney Disease and hyperparathyroid of renal origin    Anemia   There has been no abdominal pain, bruising/bleeding easily, confusion, fever, light-headedness or palpitations.   Hypertension   Pertinent negatives include no chest pain, headaches, neck pain, palpitations or shortness of breath.   Edema   Pertinent negatives include no abdominal pain, arthralgias, chest pain, chills, coughing, diaphoresis, fatigue, fever, headaches, joint swelling, nausea, neck pain, numbness, rash, vomiting or weakness.   Patient is an 83-year-old female with long-standing history of hypertension and hypertensive nephropathy baseline creatinine ranging between 1.4 and 1.6 for the last 6 years;In 2008 she had hyperkalemia due to Aldactone and ACE inhibitor combination;  she has not seen any nephrologist in last 5 years; today she comes for an evaluation; In the past he has had multiple renal ultrasound which showed chronic medical disease never had any proteinuria; negative workup for renal artery stenosis; She had a high blood pressure and the cardiologist started her on Diovan;     Had taken celebrex for 10 years and none since 2007 ;     S/p BCCA right forearm surgery     3/2014  visit her HCTZ and Lasix were stopped and started on demadex 20 mg but she had polyuria and Na dropped to 129 and she was dehdrated and demadex was stopped       Has legs swelling L>R    4/2014 ALso had a gout flare up in left big toe s/p prednisone     12/2014 to 3/2014 had depression with FTT ; off clonazepam and now better with celexa     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   HENT: Negative for ear discharge, hearing loss and postnasal drip.    Eyes: Negative for visual disturbance.   Respiratory: Negative for apnea, cough, chest tightness, shortness of breath, wheezing and stridor.    Cardiovascular: Negative for chest pain, palpitations and leg swelling.   Gastrointestinal: Negative for abdominal distention, abdominal pain, blood in stool, diarrhea, nausea and vomiting.   Genitourinary: Negative for decreased urine volume, difficulty urinating, dyspareunia, dysuria, enuresis, flank pain, frequency, genital sores, hematuria, pelvic pain, urgency and vaginal discharge.   Musculoskeletal: Negative for arthralgias, back pain, gait problem, joint swelling, neck pain and neck stiffness.   Skin: Negative for color change and rash.   Neurological: Negative for dizziness, tremors, seizures, syncope, weakness, light-headedness, numbness and headaches.   Hematological: Does not bruise/bleed easily.   Psychiatric/Behavioral: Negative for agitation, confusion, sleep disturbance and suicidal ideas.       Objective:       Vitals:    04/19/17 1258   BP: (!) 140/52   Pulse: 80   Weight: 88.4 kg (194 lb 14.2 oz)   Height: 5' 4" (1.626 m)       Physical Exam      Constitutional: She is oriented to person, place, and time. She appears well-developed and well-nourished.   HENT:   Head: Normocephalic and atraumatic.   Eyes: Conjunctivae and EOM are normal. Pupils are equal, round, and reactive to light.   Neck: Normal range of motion and full passive range of motion without pain. Neck supple. Carotid bruit is not present. No edema present. No thyroid mass and no thyromegaly present.   Cardiovascular: Normal rate, regular rhythm, S1 normal, S2 normal, intact distal pulses and normal pulses.  PMI is not " displaced.  Exam reveals no friction rub.    Murmur heard.   Systolic murmur is present with a grade of 2/6   Pacemaker in place   Pulmonary/Chest: Effort normal and breath sounds normal. No accessory muscle usage. No respiratory distress. She has no wheezes. She has no rales. She exhibits no tenderness.   Abdominal: Soft. Bowel sounds are normal. She exhibits no distension and no mass. There is no hepatosplenomegaly. There is no tenderness. There is no rebound and no CVA tenderness. No hernia.   Musculoskeletal: She exhibits edema. She exhibits no tenderness.        Right wrist: She exhibits decreased range of motion.   Tendonitis is better     Basal cell ca    Neurological: She is alert and oriented to person, place, and time. She has normal reflexes. No cranial nerve deficit or sensory deficit. She exhibits normal muscle tone. Coordination normal.   Walks with a cane   Skin: Skin is warm and dry. No bruising, no ecchymosis and no rash noted. No cyanosis or erythema. No pallor. Nails show no clubbing.   Psychiatric: She has a normal mood and affect. Her speech is normal and behavior is normal. Judgment and thought content normal.       Lab Results   Component Value Date    CREATININE 1.6 (H) 03/13/2017    BUN 24 (H) 03/13/2017     03/13/2017    K 4.3 03/13/2017     03/13/2017    CO2 25 03/13/2017     Lab Results   Component Value Date    PTH 37.0 07/18/2016    CALCIUM 9.1 03/13/2017    PHOS 4.4 03/13/2017     Lab Results   Component Value Date    URICACID 5.4 07/18/2016     Lab Results   Component Value Date    WBC 6.27 03/13/2017    HGB 11.1 (L) 03/13/2017    HCT 33.4 (L) 03/13/2017     (H) 03/13/2017     03/13/2017   iron sats 17 %     Urine protein 0.1 G /24 hours     Vitamin D level is 31    Assessment:       1. CKD (chronic kidney disease), stage 4 (severe)    2. Localized edema    3. Essential hypertension    4. 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 HPTH (hyperparathyroidism)    6. Hyperkalemia    7. Recurrent UTI    8. Anemia associated with chronic renal failure        Plan:        1.  Chronic kidney disease stage IV: avoid nonsteroidals; hypertensive nephropathy; stable ; GFR 24%; vitamin D level is 31, PTH level is normal    2.  Hypertension much better controlled at home needs more monitoring at home     3.  Edema with hyponatremia: stable on Lasix    4.  Anemia: s/p  one dose of IV iron; reorder IV iron     5.  Right Tennis Elbow tendonitis: no more steroids ( made her have psychosis )    6.  Chronic gout status post acute flareup in 2014 : uric acid is better last time checked was in July 2016; allopurinol  200 mg once a day    7. Arthritis : no NSAIDS ; try DMSO with or without OTC hydrocortisone 1%       8. Anxiety : watch for drowsiness and while driving ; watch on celexa ( started 2014 )     9. UTI :  recurrent ; CB juice and other instructions given ; recheck UA and consider prophylactic Antibiotics if needed ; if she has anymore serious UTI will place her on cipro alternating with Keflex prophylactic antibiotics ; no serious UTI in the last 2 year    followup 6 months      Kevan Benson MD

## 2017-04-19 NOTE — PATIENT INSTRUCTIONS
.  Chronic kidney disease stage IV: avoid nonsteroidals; hypertensive nephropathy; stable ; GFR 24%; vitamin D level is 31, PTH level is normal    2.  Hypertension much better controlled at home needs more monitoring at home     3.  Edema with hyponatremia: stable on Lasix    4.  Anemia: s/p  one dose of IV iron; reorder IV iron     5.  Right Tennis Elbow tendonitis: no more steroids ( made her have psychosis )    6.  Chronic gout status post acute flareup in 2014 : uric acid is better last time checked was in July 2016; allopurinol  200 mg once a day    7. Arthritis : no NSAIDS ; try DMSO with or without OTC hydrocortisone 1%       8. Anxiety : watch for drowsiness and while driving ; watch on celexa ( started 2014 )     9. UTI :  recurrent ; CB juice and other instructions given ; recheck UA and consider prophylactic Antibiotics if needed ; if she has anymore serious UTI will place her on cipro alternating with Keflex prophylactic antibiotics ; no serious UTI in the last 2 year

## 2017-06-01 ENCOUNTER — INFUSION (OUTPATIENT)
Dept: INFUSION THERAPY | Facility: HOSPITAL | Age: 82
End: 2017-06-01
Attending: INTERNAL MEDICINE
Payer: MEDICARE

## 2017-06-01 VITALS
HEIGHT: 64 IN | SYSTOLIC BLOOD PRESSURE: 153 MMHG | HEART RATE: 59 BPM | DIASTOLIC BLOOD PRESSURE: 59 MMHG | RESPIRATION RATE: 16 BRPM | WEIGHT: 194.88 LBS | BODY MASS INDEX: 33.27 KG/M2 | TEMPERATURE: 99 F

## 2017-06-01 DIAGNOSIS — E87.1 HYPONATREMIA: ICD-10-CM

## 2017-06-01 DIAGNOSIS — I45.10 RIGHT BUNDLE BRANCH BLOCK: ICD-10-CM

## 2017-06-01 DIAGNOSIS — R79.89 ELEVATED TROPONIN: ICD-10-CM

## 2017-06-01 DIAGNOSIS — Z95.0 CARDIAC PACEMAKER: ICD-10-CM

## 2017-06-01 DIAGNOSIS — I49.5 SINOATRIAL NODE DYSFUNCTION: ICD-10-CM

## 2017-06-01 DIAGNOSIS — E21.3 HPTH (HYPERPARATHYROIDISM): ICD-10-CM

## 2017-06-01 DIAGNOSIS — E78.5 HYPERLIPIDEMIA, UNSPECIFIED HYPERLIPIDEMIA TYPE: ICD-10-CM

## 2017-06-01 DIAGNOSIS — I35.9 AORTIC VALVE DISORDER: ICD-10-CM

## 2017-06-01 DIAGNOSIS — N18.30 CKD (CHRONIC KIDNEY DISEASE) STAGE 3, GFR 30-59 ML/MIN: ICD-10-CM

## 2017-06-01 DIAGNOSIS — R60.0 LOCALIZED EDEMA: ICD-10-CM

## 2017-06-01 DIAGNOSIS — D63.1 ANEMIA ASSOCIATED WITH CHRONIC RENAL FAILURE: ICD-10-CM

## 2017-06-01 DIAGNOSIS — D63.1 ANEMIA ASSOCIATED WITH STAGE 3 CHRONIC RENAL FAILURE: ICD-10-CM

## 2017-06-01 DIAGNOSIS — D49.89 ORBITAL TUMOR: ICD-10-CM

## 2017-06-01 DIAGNOSIS — I10 ESSENTIAL HYPERTENSION: ICD-10-CM

## 2017-06-01 DIAGNOSIS — N18.30 ANEMIA ASSOCIATED WITH STAGE 3 CHRONIC RENAL FAILURE: ICD-10-CM

## 2017-06-01 DIAGNOSIS — I65.23 BILATERAL CAROTID ARTERY STENOSIS: ICD-10-CM

## 2017-06-01 DIAGNOSIS — R55 NEAR SYNCOPE: ICD-10-CM

## 2017-06-01 DIAGNOSIS — I51.89 DIASTOLIC DYSFUNCTION: ICD-10-CM

## 2017-06-01 DIAGNOSIS — N18.9 ANEMIA ASSOCIATED WITH CHRONIC RENAL FAILURE: ICD-10-CM

## 2017-06-01 DIAGNOSIS — H47.093 DISORDER OF OPTIC NERVE OF BOTH EYES: ICD-10-CM

## 2017-06-01 DIAGNOSIS — I11.0 BENIGN HYPERTENSIVE HEART DISEASE WITH HEART FAILURE: ICD-10-CM

## 2017-06-01 DIAGNOSIS — M19.90 ARTHRITIS: ICD-10-CM

## 2017-06-01 DIAGNOSIS — H52.7 REFRACTIVE ERROR: Primary | ICD-10-CM

## 2017-06-01 DIAGNOSIS — I27.20 PULMONARY HTN: ICD-10-CM

## 2017-06-01 PROCEDURE — 96365 THER/PROPH/DIAG IV INF INIT: CPT | Mod: PO

## 2017-06-01 PROCEDURE — 63600175 PHARM REV CODE 636 W HCPCS: Mod: PO | Performed by: INTERNAL MEDICINE

## 2017-06-01 PROCEDURE — 25000003 PHARM REV CODE 250: Mod: PO | Performed by: INTERNAL MEDICINE

## 2017-06-01 RX ORDER — DIPHENHYDRAMINE HCL 25 MG
25 CAPSULE ORAL
Status: CANCELLED
Start: 2017-06-01 | End: 2017-06-01

## 2017-06-01 RX ORDER — SODIUM CHLORIDE 0.9 % (FLUSH) 0.9 %
10 SYRINGE (ML) INJECTION
Status: CANCELLED | OUTPATIENT
Start: 2017-06-01

## 2017-06-01 RX ADMIN — FERUMOXYTOL 510 MG: 510 INJECTION INTRAVENOUS at 02:06

## 2017-06-01 NOTE — PLAN OF CARE
"Problem: Patient Care Overview  Goal: Plan of Care Review  Outcome: Ongoing (interventions implemented as appropriate)  Pt states feeling "tired".      "

## 2017-06-01 NOTE — PATIENT INSTRUCTIONS
[facility]  [address]  [address] [address]  [phone] phone     [phone] fax  Hours of Operation: Monday- Friday 8:00am- 5:00pm  After hours phone  [phone]  Hematology / Oncology Physicians on call      Dr. Imtiaz Mcgee    Please call with any concerns regarding your appointment today.      ANEMIA, Iron Deficiency [Adult]  Anemia is a condition where the size or number of red blood cells in the body is reduced. Iron is needed in the diet to make red cells. The red blood cells carry oxygen to all parts of the body. Anemia limits the delivery of oxygen to where it is needed. This causes a feeling of being tired and run down. When anemia becomes severe, the skin becomes pale and there is shortness of breath with exertion, headaches, dizziness, drowsiness and fatigue.  The cause of your anemia is lack of iron in your body. This may occur due to blood loss (for example, heavy menstrual periods or bleeding from the stomach or intestines) or a poor diet (not eating enough iron-containing foods), inability to absorb iron from your diet, or pregnancy.  If the blood count is low enough, an IRON SUPPLEMENT will be prescribed. It usually takes about 2-3 months of treatment with iron supplements to correct an anemia. Severe cases of anemia requires a blood transfusion to rapidly correct symptoms and deliver more oxygen to the cells.  HOME CARE:  1) Increase the iron stores in your body by eating foods high in iron content. This is a natural way of building your blood cells back up again. Beef, liver, spinach and other dark green leafy vegetables, whole grain products, beans and nuts are all natural sources of iron.  2) If you are having symptoms of anemia listed above:  -- Do not overexert yourself.  -- Talk to your doctor before flying on an airplane or travelling to high altitudes.  FOLLOW UP with your doctor in 2 months for a  repeat red blood cell count, or as recommended by our staff, to be sure that the anemia has been corrected.  GET PROMPT MEDICAL ATTENTION if any of the following occur or worsen:  -- Shortness of breath or chest pain  -- Dizziness or fainting  -- Vomiting blood or passing red or black-colored stool  © 2000-2011 [facility], [address] [place] [address]. All rights reserved. This information is not intended as a substitute for professional medical care. Always follow your healthcare professional's instructions.    FALL PREVENTION   Falls often occur due to slipping, tripping or losing your balance. Here are ways to reduce your risk of falling again.   Was there anything that caused your fall that can be fixed, removed or replaced?   Make your home safe by keeping walkways clear of objects you may trip over.   Use non-slip pads under rugs.   Do not walk in poorly lit areas.   Do not stand on chairs or wobbly ladders.   Use caution when reaching overhead or looking upward. This position can cause a loss of balance.   Be sure your shoes fit properly, have non-slip bottoms and are in good condition.   Be cautious when going up and down stairs, curbs, and when walking on uneven sidewalks.   If your balance is poor, consider using a cane or walker.   If your fall was related to alcohol use, stop or limit alcohol intake.   If your fall was related to use of sleeping medicines, talk to your doctor about this. You may need to reduce your dosage at bedtime if you awaken during the night to go to the bathroom.   To reduce the need for nighttime bathroom trips:   Avoid drinking fluids for several hours before going to bed   Empty your bladder before going to bed   Men can keep a urinal at the bedside   © 2000-2011 [facility], [address] [place] [address]. All rights reserved. This information is not intended as a substitute for professional medical care. Always follow your healthcare  professional's instructions.

## 2017-06-22 RX ORDER — ALLOPURINOL 100 MG/1
TABLET ORAL
Qty: 180 TABLET | Refills: 0 | Status: SHIPPED | OUTPATIENT
Start: 2017-06-22 | End: 2017-12-15 | Stop reason: SDUPTHER

## 2017-06-28 DIAGNOSIS — Z95.0 CARDIAC PACEMAKER IN SITU: Primary | ICD-10-CM

## 2017-06-28 DIAGNOSIS — I49.5 SINOATRIAL NODE DYSFUNCTION: ICD-10-CM

## 2017-07-06 ENCOUNTER — PATIENT OUTREACH (OUTPATIENT)
Dept: ADMINISTRATIVE | Facility: HOSPITAL | Age: 82
End: 2017-07-06

## 2017-07-06 ENCOUNTER — PATIENT MESSAGE (OUTPATIENT)
Dept: CARDIOLOGY | Facility: CLINIC | Age: 82
End: 2017-07-06

## 2017-07-06 DIAGNOSIS — I10 UNSPECIFIED ESSENTIAL HYPERTENSION: Primary | ICD-10-CM

## 2017-07-06 NOTE — PROGRESS NOTES
Schedule patient for annual exam on 08/02/17. Patient's daughter in agreement and vocalize understanding.

## 2017-07-24 ENCOUNTER — PATIENT MESSAGE (OUTPATIENT)
Dept: INTERNAL MEDICINE | Facility: CLINIC | Age: 82
End: 2017-07-24

## 2017-07-24 RX ORDER — ALLOPURINOL 100 MG/1
TABLET ORAL
Qty: 180 TABLET | Refills: 0 | Status: SHIPPED | OUTPATIENT
Start: 2017-07-24 | End: 2017-08-02

## 2017-07-25 ENCOUNTER — OFFICE VISIT (OUTPATIENT)
Dept: URGENT CARE | Facility: CLINIC | Age: 82
End: 2017-07-25
Payer: MEDICARE

## 2017-07-25 VITALS
WEIGHT: 193.44 LBS | SYSTOLIC BLOOD PRESSURE: 112 MMHG | OXYGEN SATURATION: 97 % | HEIGHT: 64 IN | BODY MASS INDEX: 33.02 KG/M2 | DIASTOLIC BLOOD PRESSURE: 70 MMHG | TEMPERATURE: 98 F | HEART RATE: 61 BPM

## 2017-07-25 DIAGNOSIS — N39.0 ACUTE UTI: Primary | ICD-10-CM

## 2017-07-25 LAB
BILIRUB SERPL-MCNC: NORMAL MG/DL
BLOOD URINE, POC: NEGATIVE
COLOR, POC UA: YELLOW
GLUCOSE UR QL STRIP: NORMAL
KETONES UR QL STRIP: NORMAL
LEUKOCYTE ESTERASE URINE, POC: NEGATIVE
NITRITE, POC UA: NORMAL
PH, POC UA: 5
PROTEIN, POC: NEGATIVE
SPECIFIC GRAVITY, POC UA: 1.02
UROBILINOGEN, POC UA: NORMAL

## 2017-07-25 PROCEDURE — 1126F AMNT PAIN NOTED NONE PRSNT: CPT | Mod: S$GLB,,, | Performed by: NURSE PRACTITIONER

## 2017-07-25 PROCEDURE — 99999 PR PBB SHADOW E&M-EST. PATIENT-LVL IV: CPT | Mod: PBBFAC,,, | Performed by: NURSE PRACTITIONER

## 2017-07-25 PROCEDURE — 87086 URINE CULTURE/COLONY COUNT: CPT

## 2017-07-25 PROCEDURE — 81002 URINALYSIS NONAUTO W/O SCOPE: CPT | Mod: S$GLB,,, | Performed by: NURSE PRACTITIONER

## 2017-07-25 PROCEDURE — 99214 OFFICE O/P EST MOD 30 MIN: CPT | Mod: 25,S$GLB,, | Performed by: NURSE PRACTITIONER

## 2017-07-25 PROCEDURE — 1159F MED LIST DOCD IN RCRD: CPT | Mod: S$GLB,,, | Performed by: NURSE PRACTITIONER

## 2017-07-25 RX ORDER — DOXYCYCLINE 100 MG/1
100 CAPSULE ORAL EVERY 12 HOURS
Qty: 14 CAPSULE | Refills: 0 | Status: SHIPPED | OUTPATIENT
Start: 2017-07-25 | End: 2017-08-02

## 2017-07-25 NOTE — PROGRESS NOTES
Subjective:       Patient ID: Diana Turner is a 83 y.o. female.    Chief Complaint: Urinary Retention and Altered Mental Status    HPI given by patient and daughter      Urinary Tract Infection    This is a new problem. The current episode started in the past 7 days. There has been no fever. Associated symptoms include hesitancy. Pertinent negatives include no chills, flank pain, frequency, hematuria, nausea, urgency or vomiting.     Review of Systems   Constitutional: Negative for chills, diaphoresis, fatigue and fever.   Respiratory: Negative.    Gastrointestinal: Negative for nausea and vomiting.   Genitourinary: Positive for hesitancy. Negative for difficulty urinating, dysuria, flank pain, frequency, hematuria and urgency.   Musculoskeletal: Negative for back pain and myalgias.   Neurological: Negative for dizziness and weakness.   Psychiatric/Behavioral: Positive for confusion.       Objective:      Physical Exam   Constitutional: She is oriented to person, place, and time. She appears well-developed and well-nourished. No distress.   Neurological: She is alert and oriented to person, place, and time.   Skin: Skin is warm and dry. She is not diaphoretic.       Assessment:       1. Acute UTI        Plan:   Diana was seen today for urinary retention and altered mental status.    Diagnoses and all orders for this visit:    Acute UTI  -     POCT urine dipstick without microscope  -     Urine culture  -     doxycycline (VIBRAMYCIN) 100 MG Cap; Take 1 capsule (100 mg total) by mouth every 12 (twelve) hours. Note to Pharmacy: Can substitute for Monodox if needed      -     Diagnosis and treatment discussed, AVS provided  -     Follow up with PCP or ER immediately for worsening, new or no improvement of symptoms.   -     Patient/daughter understands and agrees with plan

## 2017-07-25 NOTE — PATIENT INSTRUCTIONS

## 2017-07-27 LAB
BACTERIA UR CULT: NORMAL
BACTERIA UR CULT: NORMAL

## 2017-08-02 ENCOUNTER — OFFICE VISIT (OUTPATIENT)
Dept: INTERNAL MEDICINE | Facility: CLINIC | Age: 82
End: 2017-08-02
Payer: MEDICARE

## 2017-08-02 VITALS
WEIGHT: 193.56 LBS | BODY MASS INDEX: 33.05 KG/M2 | HEART RATE: 86 BPM | DIASTOLIC BLOOD PRESSURE: 74 MMHG | HEIGHT: 64 IN | OXYGEN SATURATION: 95 % | TEMPERATURE: 98 F | SYSTOLIC BLOOD PRESSURE: 124 MMHG

## 2017-08-02 DIAGNOSIS — M89.9 DISORDER OF BONE: ICD-10-CM

## 2017-08-02 DIAGNOSIS — E66.9 OBESITY (BMI 30.0-34.9): ICD-10-CM

## 2017-08-02 DIAGNOSIS — I10 ESSENTIAL HYPERTENSION: ICD-10-CM

## 2017-08-02 DIAGNOSIS — N18.30 CKD (CHRONIC KIDNEY DISEASE), STAGE III: ICD-10-CM

## 2017-08-02 DIAGNOSIS — F33.9 MAJOR DEPRESSION, RECURRENT, CHRONIC: Primary | ICD-10-CM

## 2017-08-02 DIAGNOSIS — E78.5 HYPERLIPIDEMIA LDL GOAL <130: ICD-10-CM

## 2017-08-02 PROCEDURE — 99499 UNLISTED E&M SERVICE: CPT | Mod: S$GLB,,, | Performed by: INTERNAL MEDICINE

## 2017-08-02 PROCEDURE — 99214 OFFICE O/P EST MOD 30 MIN: CPT | Mod: S$GLB,,, | Performed by: INTERNAL MEDICINE

## 2017-08-02 PROCEDURE — 1159F MED LIST DOCD IN RCRD: CPT | Mod: S$GLB,,, | Performed by: INTERNAL MEDICINE

## 2017-08-02 PROCEDURE — 99999 PR PBB SHADOW E&M-EST. PATIENT-LVL III: CPT | Mod: PBBFAC,,, | Performed by: INTERNAL MEDICINE
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
## 2017-08-10 ENCOUNTER — HOSPITAL ENCOUNTER (OUTPATIENT)
Dept: RADIOLOGY | Facility: HOSPITAL | Age: 82
Discharge: HOME | End: 2017-08-10
Attending: INTERNAL MEDICINE
Payer: MEDICARE

## 2017-08-10 ENCOUNTER — PATIENT MESSAGE (OUTPATIENT)
Dept: ADMINISTRATIVE | Facility: OTHER | Age: 82
End: 2017-08-10

## 2017-08-10 ENCOUNTER — CLINICAL SUPPORT (OUTPATIENT)
Dept: CARDIOLOGY | Facility: CLINIC | Age: 82
End: 2017-08-10
Payer: MEDICARE

## 2017-08-10 ENCOUNTER — OFFICE VISIT (OUTPATIENT)
Dept: CARDIOLOGY | Facility: CLINIC | Age: 82
End: 2017-08-10
Payer: MEDICARE

## 2017-08-10 VITALS
SYSTOLIC BLOOD PRESSURE: 126 MMHG | HEIGHT: 64 IN | HEART RATE: 84 BPM | WEIGHT: 193 LBS | DIASTOLIC BLOOD PRESSURE: 70 MMHG | BODY MASS INDEX: 32.95 KG/M2

## 2017-08-10 DIAGNOSIS — I10 ESSENTIAL HYPERTENSION: ICD-10-CM

## 2017-08-10 DIAGNOSIS — I35.9 AORTIC VALVE DISORDER: ICD-10-CM

## 2017-08-10 DIAGNOSIS — I49.5 SINOATRIAL NODE DYSFUNCTION: ICD-10-CM

## 2017-08-10 DIAGNOSIS — I49.5 SSS (SICK SINUS SYNDROME): Primary | ICD-10-CM

## 2017-08-10 DIAGNOSIS — I65.23 BILATERAL CAROTID ARTERY STENOSIS: ICD-10-CM

## 2017-08-10 DIAGNOSIS — N18.9 ANEMIA ASSOCIATED WITH CHRONIC RENAL FAILURE: ICD-10-CM

## 2017-08-10 DIAGNOSIS — Z95.0 CARDIAC PACEMAKER: ICD-10-CM

## 2017-08-10 DIAGNOSIS — I45.10 RIGHT BUNDLE BRANCH BLOCK: ICD-10-CM

## 2017-08-10 DIAGNOSIS — D49.89 ORBITAL TUMOR: ICD-10-CM

## 2017-08-10 DIAGNOSIS — E87.1 HYPONATREMIA: ICD-10-CM

## 2017-08-10 DIAGNOSIS — D63.1 ANEMIA ASSOCIATED WITH CHRONIC RENAL FAILURE: ICD-10-CM

## 2017-08-10 DIAGNOSIS — I49.5 SSS (SICK SINUS SYNDROME): ICD-10-CM

## 2017-08-10 DIAGNOSIS — N18.30 CKD (CHRONIC KIDNEY DISEASE) STAGE 3, GFR 30-59 ML/MIN: ICD-10-CM

## 2017-08-10 DIAGNOSIS — Z95.0 CARDIAC PACEMAKER IN SITU: ICD-10-CM

## 2017-08-10 DIAGNOSIS — E78.5 HYPERLIPIDEMIA, UNSPECIFIED HYPERLIPIDEMIA TYPE: ICD-10-CM

## 2017-08-10 DIAGNOSIS — I27.20 PULMONARY HTN: ICD-10-CM

## 2017-08-10 DIAGNOSIS — I51.89 DIASTOLIC DYSFUNCTION: ICD-10-CM

## 2017-08-10 PROCEDURE — 3078F DIAST BP <80 MM HG: CPT | Mod: S$GLB,,, | Performed by: INTERNAL MEDICINE

## 2017-08-10 PROCEDURE — 71020 XR CHEST PA AND LATERAL: CPT | Mod: TC

## 2017-08-10 PROCEDURE — 99499 UNLISTED E&M SERVICE: CPT | Mod: S$GLB,,, | Performed by: INTERNAL MEDICINE

## 2017-08-10 PROCEDURE — 93280 PM DEVICE PROGR EVAL DUAL: CPT | Mod: 26,,, | Performed by: INTERNAL MEDICINE

## 2017-08-10 PROCEDURE — 3074F SYST BP LT 130 MM HG: CPT | Mod: S$GLB,,, | Performed by: INTERNAL MEDICINE

## 2017-08-10 PROCEDURE — 1159F MED LIST DOCD IN RCRD: CPT | Mod: S$GLB,,, | Performed by: INTERNAL MEDICINE

## 2017-08-10 PROCEDURE — 99214 OFFICE O/P EST MOD 30 MIN: CPT | Mod: S$GLB,,, | Performed by: INTERNAL MEDICINE

## 2017-08-10 PROCEDURE — 71020 XR CHEST PA AND LATERAL: CPT | Mod: 26,,, | Performed by: RADIOLOGY

## 2017-08-10 PROCEDURE — 3008F BODY MASS INDEX DOCD: CPT | Mod: S$GLB,,, | Performed by: INTERNAL MEDICINE

## 2017-08-10 PROCEDURE — 99999 PR PBB SHADOW E&M-EST. PATIENT-LVL III: CPT | Mod: PBBFAC,,, | Performed by: INTERNAL MEDICINE

## 2017-08-10 PROCEDURE — 93000 ELECTROCARDIOGRAM COMPLETE: CPT | Mod: S$GLB,,, | Performed by: NUCLEAR MEDICINE
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
## 2017-08-11 ENCOUNTER — TELEPHONE (OUTPATIENT)
Dept: CARDIOLOGY | Facility: CLINIC | Age: 82
End: 2017-08-11

## 2017-08-21 ENCOUNTER — SURGERY (OUTPATIENT)
Age: 82
End: 2017-08-21

## 2017-08-21 ENCOUNTER — HOSPITAL ENCOUNTER (OUTPATIENT)
Facility: HOSPITAL | Age: 82
Discharge: HOME | End: 2017-08-21
Attending: INTERNAL MEDICINE | Admitting: INTERNAL MEDICINE
Payer: MEDICARE

## 2017-08-21 VITALS
HEART RATE: 62 BPM | TEMPERATURE: 98 F | HEIGHT: 64 IN | RESPIRATION RATE: 19 BRPM | DIASTOLIC BLOOD PRESSURE: 48 MMHG | WEIGHT: 194 LBS | BODY MASS INDEX: 33.12 KG/M2 | OXYGEN SATURATION: 98 % | SYSTOLIC BLOOD PRESSURE: 127 MMHG

## 2017-08-21 DIAGNOSIS — Z45.010 PACEMAKER AT END OF BATTERY LIFE: ICD-10-CM

## 2017-08-21 DIAGNOSIS — I44.2 ATRIOVENTRICULAR BLOCK, COMPLETE: ICD-10-CM

## 2017-08-21 DIAGNOSIS — I65.29 OCCLUSION AND STENOSIS OF UNSPECIFIED CAROTID ARTERY: ICD-10-CM

## 2017-08-21 LAB
ANION GAP SERPL CALC-SCNC: 12 MMOL/L
APTT BLDCRRT: 28.5 SEC
BASOPHILS # BLD AUTO: 0.03 K/UL
BASOPHILS NFR BLD: 0.5 %
BUN SERPL-MCNC: 20 MG/DL
CALCIUM SERPL-MCNC: 9.5 MG/DL
CHLORIDE SERPL-SCNC: 103 MMOL/L
CO2 SERPL-SCNC: 25 MMOL/L
CREAT SERPL-MCNC: 1.5 MG/DL
DIFFERENTIAL METHOD: ABNORMAL
EOSINOPHIL # BLD AUTO: 0.3 K/UL
EOSINOPHIL NFR BLD: 5.2 %
ERYTHROCYTE [DISTWIDTH] IN BLOOD BY AUTOMATED COUNT: 14.4 %
EST. GFR  (AFRICAN AMERICAN): 37 ML/MIN/1.73 M^2
EST. GFR  (NON AFRICAN AMERICAN): 32 ML/MIN/1.73 M^2
GLUCOSE SERPL-MCNC: 89 MG/DL
HCT VFR BLD AUTO: 36.3 %
HGB BLD-MCNC: 12.2 G/DL
LYMPHOCYTES # BLD AUTO: 1.4 K/UL
LYMPHOCYTES NFR BLD: 21.8 %
MCH RBC QN AUTO: 36.5 PG
MCHC RBC AUTO-ENTMCNC: 33.6 G/DL
MCV RBC AUTO: 109 FL
MONOCYTES # BLD AUTO: 0.8 K/UL
MONOCYTES NFR BLD: 12.1 %
NEUTROPHILS # BLD AUTO: 3.8 K/UL
NEUTROPHILS NFR BLD: 60.4 %
PLATELET # BLD AUTO: 216 K/UL
PMV BLD AUTO: 10.5 FL
POTASSIUM SERPL-SCNC: 4.2 MMOL/L
RBC # BLD AUTO: 3.34 M/UL
SODIUM SERPL-SCNC: 140 MMOL/L
WBC # BLD AUTO: 6.2 K/UL

## 2017-08-21 PROCEDURE — A4216 STERILE WATER/SALINE, 10 ML: HCPCS

## 2017-08-21 PROCEDURE — 99152 MOD SED SAME PHYS/QHP 5/>YRS: CPT | Mod: ,,, | Performed by: INTERNAL MEDICINE

## 2017-08-21 PROCEDURE — 63600175 PHARM REV CODE 636 W HCPCS

## 2017-08-21 PROCEDURE — 85730 THROMBOPLASTIN TIME PARTIAL: CPT

## 2017-08-21 PROCEDURE — 93010 ELECTROCARDIOGRAM REPORT: CPT | Mod: ,,, | Performed by: INTERNAL MEDICINE

## 2017-08-21 PROCEDURE — 25000003 PHARM REV CODE 250

## 2017-08-21 PROCEDURE — 93005 ELECTROCARDIOGRAM TRACING: CPT | Mod: 59

## 2017-08-21 PROCEDURE — 27100006 EP LAB PROCEDURE

## 2017-08-21 PROCEDURE — 85025 COMPLETE CBC W/AUTO DIFF WBC: CPT

## 2017-08-21 PROCEDURE — 80048 BASIC METABOLIC PNL TOTAL CA: CPT

## 2017-08-21 PROCEDURE — 33228 REMV&REPLC PM GEN DUAL LEAD: CPT | Mod: ,,, | Performed by: INTERNAL MEDICINE

## 2017-08-21 RX ORDER — CEFAZOLIN SODIUM 2 G/50ML
2 SOLUTION INTRAVENOUS ONCE
Status: COMPLETED | OUTPATIENT
Start: 2017-08-21 | End: 2017-08-21

## 2017-08-21 RX ORDER — ACETAMINOPHEN 325 MG/1
650 TABLET ORAL EVERY 8 HOURS PRN
Status: DISCONTINUED | OUTPATIENT
Start: 2017-08-21 | End: 2017-08-21 | Stop reason: HOSPADM

## 2017-08-21 RX ORDER — CEFAZOLIN SODIUM 1 G/50ML
1 SOLUTION INTRAVENOUS ONCE
Status: COMPLETED | OUTPATIENT
Start: 2017-08-21 | End: 2017-08-21

## 2017-08-21 RX ADMIN — CEFAZOLIN SODIUM 2 G: 2 SOLUTION INTRAVENOUS at 07:08

## 2017-08-21 RX ADMIN — CEFAZOLIN SODIUM 1 G: 1 SOLUTION INTRAVENOUS at 11:08

## 2017-08-21 NOTE — BRIEF OP NOTE
<[facility]  Surgery Department  Operative Note    SUMMARY     Date of Procedure: 8/21/2017     Procedure: Procedure(s) (LRB):  REPLACEMENT-GENERATOR-PACEMAKER (Left)     Surgeon(s) and Role:     * Lupillo Flahetry MD - Primary    Assisting Surgeon: None    Pre-Operative Diagnosis: CHB (complete heart block) [I44.2]    Post-Operative Diagnosis: Post-Op Diagnosis Codes:     * CHB (complete heart block) [I44.2]    Anesthesia: RN IV Sedation    Technical Procedures Used: PPM generator change    Description of the Findings of the Procedure: PPM generator cgange via L subclavian- [employer]    Significant Surgical Tasks Conducted by the Assistant(s), if Applicable: none    Complications: No    Estimated Blood Loss (EBL): < 50 cc           Implants: * No implants in log *    Specimens:   Specimen (12h ago through future)    None                  Condition: Good    Disposition: PACU - hemodynamically stable.    Attestation: I was present and scrubbed for the entire procedure.

## 2017-08-21 NOTE — PROGRESS NOTES
Discharge paperwork given to daughter. Pt not in any distress at this time. Pt taken out per wheelchair and driven home per daughter at bedside.

## 2017-08-21 NOTE — H&P
Consult Note  Cardiology    Consult Requested By:  Reason for Consult:     SUBJECTIVE:     History of Present Illness:  Patient is a 83 y.o. female presents with PPM ELSA.  Pt here for generator change.    Review of patient's allergies indicates:   Allergen Reactions    Pravastatin Other (See Comments)    Sulfa (sulfonamide antibiotics) Swelling and Other (See Comments)    Yeast, dried Other (See Comments)       Past Medical History:   Diagnosis Date    Anemia     Aortic valve disorders     Arthritis     Benign hyperten heart disease w/ CHF     Cardiac pacemaker 10/10/2013    Carotid artery occlusion     Chronic renal insufficiency, stage III (moderate) 10/10/2013    Depression     Hyperlipidemia     Hypertension     Kidney cysts     right US renal artery 9/2013    Obesity     Orbital mass     Pneumonia     as a child, required hospitalization    Right bundle branch block     Sinoatrial node dysfunction     Thyroid disease     goiter    Urinary incontinence      Past Surgical History:   Procedure Laterality Date    ADENOIDECTOMY      APPENDECTOMY      CHOLECYSTECTOMY      HEMORRHOID SURGERY      HYSTERECTOMY      RAHUL; benign reasons    INSERT / REPLACE / REMOVE PACEMAKER  09/25/2008    [employer]    KNEE SURGERY      right    TONSILLECTOMY       Family History   Problem Relation Age of Onset    Hypertension Mother     Kidney disease Mother     Heart disease Brother     Hyperlipidemia Brother     Hypertension Brother     Diabetes Cousin     Cancer Neg Hx     COPD Neg Hx     Stroke Neg Hx      Social History   Substance Use Topics    Smoking status: Never Smoker    Smokeless tobacco: Never Used    Alcohol use No        Review of Systems:  Constitutional: negative  Eyes: negative  Ears, nose, mouth, throat, and face: negative  Respiratory: negative  Cardiovascular: negative  Gastrointestinal: negative  Genitourinary:negative  Hematologic/lymphatic:  negative  Musculoskeletal:negative,   Neurological: negative  Behavioral/Psych: negative  Endocrine: negative  Allergic/Immunologic: negative    OBJECTIVE:     Vital Signs (Most Recent)       Vital Signs Range (Last 24H):       Current Facility-Administered Medications   Medication    diphenhydrAMINE capsule 25 mg    ferumoxytol (FERAHEME) 510 mg in dextrose 5 % 100 mL IVPB    heparin, porcine (PF) 100 unit/mL injection flush 500 Units    sodium chloride 0.9% flush 10 mL     Current Outpatient Prescriptions   Medication Sig    acetaminophen (TYLENOL) 500 MG tablet Take 500 mg by mouth every 6 (six) hours as needed.    allopurinol (ZYLOPRIM) 100 MG tablet TAKE 2 TABLETS BY MOUTH ONCE DAILY.    aspirin 81 mg CpDR Take 1 tablet by mouth Daily.    atenolol (TENORMIN) 100 MG tablet Take 1 tablet (100 mg total) by mouth once daily.    calcitRIOL (ROCALTROL) 0.25 MCG Cap TAKE 1 CAPSULE (0.25 MCG TOTAL) BY MOUTH ONCE DAILY.    citalopram (CELEXA) 20 MG tablet Take 1 tablet (20 mg total) by mouth once daily.    diltiaZEM (CARDIZEM CD) 240 MG 24 hr capsule TAKE 1 CAPSULE BY MOUTH ONCE DAILY.    ezetimibe (ZETIA) 10 mg tablet Take 1 tablet (10 mg total) by mouth once daily.    FLUTICASONE PROPIONATE (CLARISPRAY NASL) by Nasal route.    furosemide (LASIX) 40 MG tablet TAKE 1 TABLET (40 MG TOTAL) BY MOUTH ONCE DAILY.    meclizine (ANTIVERT) 25 mg tablet TAKE 1 TABLET (25 MG TOTAL) BY MOUTH EVERY EVENING.    omega-3 fatty acids-vitamin E (FISH OIL) 1,000 mg Cap Take 1 capsule by mouth Twice daily.    senna (SENOKOT) 8.6 mg tablet Take 1 tablet by mouth once daily.     Current Facility-Administered Medications on File Prior to Encounter   Medication    diphenhydrAMINE capsule 25 mg    ferumoxytol (FERAHEME) 510 mg in dextrose 5 % 100 mL IVPB    heparin, porcine (PF) 100 unit/mL injection flush 500 Units    sodium chloride 0.9% flush 10 mL     Current Outpatient Prescriptions on File Prior to Encounter    Medication Sig    acetaminophen (TYLENOL) 500 MG tablet Take 500 mg by mouth every 6 (six) hours as needed.    allopurinol (ZYLOPRIM) 100 MG tablet TAKE 2 TABLETS BY MOUTH ONCE DAILY.    aspirin 81 mg CpDR Take 1 tablet by mouth Daily.    atenolol (TENORMIN) 100 MG tablet Take 1 tablet (100 mg total) by mouth once daily.    calcitRIOL (ROCALTROL) 0.25 MCG Cap TAKE 1 CAPSULE (0.25 MCG TOTAL) BY MOUTH ONCE DAILY.    citalopram (CELEXA) 20 MG tablet Take 1 tablet (20 mg total) by mouth once daily.    diltiaZEM (CARDIZEM CD) 240 MG 24 hr capsule TAKE 1 CAPSULE BY MOUTH ONCE DAILY.    ezetimibe (ZETIA) 10 mg tablet Take 1 tablet (10 mg total) by mouth once daily.    FLUTICASONE PROPIONATE (CLARISPRAY NASL) by Nasal route.    furosemide (LASIX) 40 MG tablet TAKE 1 TABLET (40 MG TOTAL) BY MOUTH ONCE DAILY.    meclizine (ANTIVERT) 25 mg tablet TAKE 1 TABLET (25 MG TOTAL) BY MOUTH EVERY EVENING.    omega-3 fatty acids-vitamin E (FISH OIL) 1,000 mg Cap Take 1 capsule by mouth Twice daily.    senna (SENOKOT) 8.6 mg tablet Take 1 tablet by mouth once daily.       Physical Exam:  General appearance: alert, appears stated age and cooperative  Head: Normocephalic, without obvious abnormality, atraumatic  Eyes:  conjunctivae/corneas clear. PERRL, EOM's intact. Fundi benign.  Nose: no discharge  Throat: normal findings: tongue midline and normal  Neck: no adenopathy, no carotid bruit, no JVD, supple, symmetrical, trachea midline and thyroid not enlarged, symmetric, no tenderness/mass/nodules  Back:  no skin lesions, erythema, or scars  Lungs:  clear to auscultation bilaterally  Chest wall: no tenderness  Heart: regular rate and rhythm, S1, S2 normal, no murmur, click, rub or gallop  Abdomen: soft, non-tender; bowel sounds normal; no masses,  no organomegaly  Extremities: extremities normal, atraumatic, no cyanosis or edema  Pulses: 2+ and symmetric  Skin: Skin color, texture, turgor normal. No rashes or  lesions  Neurologic: Grossly normal    Laboratory:  Chemistry:   Lab Results   Component Value Date     08/10/2017    K 4.4 08/10/2017     08/10/2017    CO2 27 08/10/2017    BUN 21 08/10/2017    CREATININE 1.4 08/10/2017    CALCIUM 9.9 08/10/2017     Cardiac Markers:   Lab Results   Component Value Date    CKMB 2.6 05/29/2011    TROPONINI 0.024 09/11/2016    TROPONINI <0.04 05/29/2011     Cardiac Markers (Last 3):   Lab Results   Component Value Date    CKMB 2.6 05/29/2011    TROPONINI 0.024 09/11/2016    TROPONINI 0.017 09/10/2016    TROPONINI 0.027 (H) 09/10/2016    TROPONINI <0.04 05/29/2011     CBC:   Lab Results   Component Value Date    WBC 6.17 08/10/2017    HGB 11.8 (L) 08/10/2017    HCT 34.6 (L) 08/10/2017     (H) 08/10/2017     08/10/2017     Lipids:   Lab Results   Component Value Date    CHOL 213 (H) 07/25/2017    TRIG 120 07/25/2017    HDL 41 07/25/2017     Coagulation:   Lab Results   Component Value Date    INR 1.0 08/10/2017    APTT 26.6 08/10/2017       Diagnostic Results:  ECG: Reviewed  X-Ray: Reviewed  US: Reviewed  CT: Reviewed  Echo: Reviewed      ASSESSMENT/PLAN:     Patient Active Problem List   Diagnosis    Carotid artery stenosis    Benign hypertensive heart disease with heart failure    Aortic valve disorder    Right bundle branch block    SSS (sick sinus syndrome)    Hypertension    Hyperlipidemia    Cardiac pacemaker    CKD (chronic kidney disease) stage 3, GFR 30-59 ml/min    Arthritis    Edema    HPTH (hyperparathyroidism)    Pulmonary HTN    Hyponatremia    Anemia associated with chronic renal failure    Gout, chronic    Orbital tumor    Nuclear sclerosis    Familial drusen    Diastolic dysfunction    Osteopenia    Obesity, Class II, BMI 35-39.9, with comorbidity    Major depression    Urinary tract infection without hematuria    Near syncope    Elevated troponin    Refractive error    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

## 2017-08-24 ENCOUNTER — CLINICAL SUPPORT (OUTPATIENT)
Dept: CARDIOLOGY | Facility: CLINIC | Age: 82
End: 2017-08-24
Payer: MEDICARE

## 2017-08-24 ENCOUNTER — PATIENT MESSAGE (OUTPATIENT)
Dept: CARDIOLOGY | Facility: CLINIC | Age: 82
End: 2017-08-24

## 2017-08-24 ENCOUNTER — TELEPHONE (OUTPATIENT)
Dept: CARDIOLOGY | Facility: CLINIC | Age: 82
End: 2017-08-24

## 2017-08-24 NOTE — TELEPHONE ENCOUNTER
pts daughter called, she says pt raised her arms and has pulled on her nightgown, she says they thought they had made all precautions to keep her from messing with the wound but now has only 3 steristrips left on incision and no bandage.     I notified Sherita Mack RN.     Nurse visit scheduled to look at wound and check impedence on her leads to make sure it is working properly.

## 2017-08-24 NOTE — PROGRESS NOTES
Pt presents today after pacemaker generator change by Dr. Flaherty on 8/21/17.  Daughter concerned about device working properly since patient has not been observing arm restrictions.  States pt has also pulled off steri strips and dressing, would like wound check.  Left subclavian incision site intact with edges approximated, no drainage noted.  Small hematoma noted above incision site, firm but not hard.   Daughter states patient has not taken any aspirin for the past 10 days.   Instructions given to apply warm compress to site 2 times a day, RTC In 1 week for device pocket check and full interrogation.  Reminded patient of continued arm restrictions for another week as well as signs/symptoms of infection.    Checked lead integrity on device:    Implant                   Today  RV Lead:    RV Lead:       Threshold: 0.5 V / 0.4 ms  0.75V @ 0.4ms       Impedance: 712 ohms  817       R wave: 0.0 mV   Paced, 0.0mV    RA Lead:    RA Lead:       Threshold: 1 V / 0.4 ms  1.0V @ 0.4ms       Impedance: 478 ohms  480       P wave: 2.3 mV   2.8-4.0mV

## 2017-08-25 ENCOUNTER — PATIENT MESSAGE (OUTPATIENT)
Dept: CARDIOLOGY | Facility: CLINIC | Age: 82
End: 2017-08-25

## 2017-08-25 ENCOUNTER — TELEPHONE (OUTPATIENT)
Dept: CARDIOLOGY | Facility: CLINIC | Age: 82
End: 2017-08-25

## 2017-08-30 ENCOUNTER — CLINICAL SUPPORT (OUTPATIENT)
Dept: CARDIOLOGY | Facility: CLINIC | Age: 82
End: 2017-08-30
Payer: MEDICARE

## 2017-08-30 DIAGNOSIS — Z95.0 CARDIAC PACEMAKER IN SITU: ICD-10-CM

## 2017-08-30 DIAGNOSIS — I49.5 SINOATRIAL NODE DYSFUNCTION: ICD-10-CM

## 2017-08-30 PROCEDURE — 93280 PM DEVICE PROGR EVAL DUAL: CPT | Mod: S$GLB,,, | Performed by: INTERNAL MEDICINE

## 2017-11-13 DIAGNOSIS — F41.9 ANXIETY: ICD-10-CM

## 2017-11-13 DIAGNOSIS — I10 ESSENTIAL HYPERTENSION: ICD-10-CM

## 2017-11-13 DIAGNOSIS — F32.9 MAJOR DEPRESSION, CHRONIC: ICD-10-CM

## 2017-11-13 DIAGNOSIS — E78.5 HYPERLIPIDEMIA, UNSPECIFIED HYPERLIPIDEMIA TYPE: ICD-10-CM

## 2017-11-13 RX ORDER — ATENOLOL 100 MG/1
TABLET ORAL
Qty: 90 TABLET | Refills: 2 | Status: SHIPPED | OUTPATIENT
Start: 2017-11-13 | End: 2017-12-06 | Stop reason: SDUPTHER

## 2017-11-13 RX ORDER — EZETIMIBE 10 MG
TABLET ORAL
Qty: 90 TABLET | Refills: 2 | Status: SHIPPED | OUTPATIENT
Start: 2017-11-13 | End: 2017-12-06 | Stop reason: SDUPTHER

## 2017-11-16 RX ORDER — CITALOPRAM 40 MG/1
40 TABLET, FILM COATED ORAL DAILY
Qty: 30 TABLET | Refills: 6 | Status: SHIPPED | OUTPATIENT
Start: 2017-11-16 | End: 2018-08-30 | Stop reason: ALTCHOICE

## 2017-11-16 RX ORDER — CITALOPRAM 40 MG/1
TABLET, FILM COATED ORAL
Qty: 90 TABLET | Refills: 2 | OUTPATIENT
Start: 2017-11-16

## 2017-11-16 NOTE — TELEPHONE ENCOUNTER
Called and spoke with pts daughter, she states that her mother is taking the 40 mg and that was discussed at her last Ov, please advise

## 2017-11-16 NOTE — TELEPHONE ENCOUNTER
Citalopram 20 mg in med card, however 40 mg is being requested.  Please clarify dosage with patient.

## 2017-12-06 DIAGNOSIS — E78.5 HYPERLIPIDEMIA, UNSPECIFIED HYPERLIPIDEMIA TYPE: ICD-10-CM

## 2017-12-06 DIAGNOSIS — I10 ESSENTIAL HYPERTENSION: ICD-10-CM

## 2017-12-06 RX ORDER — ATENOLOL 100 MG/1
TABLET ORAL
Qty: 90 TABLET | Refills: 2 | Status: SHIPPED | OUTPATIENT
Start: 2017-12-06

## 2017-12-06 RX ORDER — EZETIMIBE 10 MG
TABLET ORAL
Qty: 90 TABLET | Refills: 2 | Status: SHIPPED | OUTPATIENT
Start: 2017-12-06 | End: 2018-08-30 | Stop reason: ALTCHOICE

## 2017-12-14 ENCOUNTER — OFFICE VISIT (OUTPATIENT)
Dept: URGENT CARE | Facility: CLINIC | Age: 82
End: 2017-12-14
Payer: MEDICARE

## 2017-12-14 VITALS
WEIGHT: 193.81 LBS | HEIGHT: 64 IN | DIASTOLIC BLOOD PRESSURE: 82 MMHG | OXYGEN SATURATION: 96 % | TEMPERATURE: 99 F | BODY MASS INDEX: 33.09 KG/M2 | SYSTOLIC BLOOD PRESSURE: 140 MMHG | HEART RATE: 87 BPM

## 2017-12-14 DIAGNOSIS — R30.0 DYSURIA: Primary | ICD-10-CM

## 2017-12-14 DIAGNOSIS — I10 ESSENTIAL HYPERTENSION: ICD-10-CM

## 2017-12-14 DIAGNOSIS — R35.0 FREQUENCY OF URINATION: ICD-10-CM

## 2017-12-14 DIAGNOSIS — N18.30 CKD (CHRONIC KIDNEY DISEASE) STAGE 3, GFR 30-59 ML/MIN: ICD-10-CM

## 2017-12-14 LAB
BILIRUB UR QL STRIP: NEGATIVE
BILIRUBIN UA: NEGATIVE
BLOOD URINE, POC: NEGATIVE
CLARITY UR REFRACT.AUTO: ABNORMAL
COLOR UR AUTO: YELLOW
COLOR, POC UA: YELLOW
GLUCOSE UR QL STRIP: NEGATIVE
GLUCOSE UR QL STRIP: NORMAL
HGB UR QL STRIP: NEGATIVE
KETONES UR QL STRIP: NEGATIVE
KETONES UR QL STRIP: NEGATIVE
LEUKOCYTE ESTERASE UR QL STRIP: ABNORMAL
LEUKOCYTE ESTERASE URINE, POC: ABNORMAL
MICROSCOPIC COMMENT: ABNORMAL
NITRITE UR QL STRIP: NEGATIVE
NITRITE, POC UA: NEGATIVE
PH UR STRIP: 6 [PH] (ref 5–8)
PH, POC UA: 5
PROT UR QL STRIP: NEGATIVE
PROTEIN, POC: NEGATIVE
RBC #/AREA URNS AUTO: 1 /HPF (ref 0–4)
SP GR UR STRIP: 1 (ref 1–1.03)
SPECIFIC GRAVITY, POC UA: 1
URN SPEC COLLECT METH UR: ABNORMAL
UROBILINOGEN UR STRIP-ACNC: NEGATIVE EU/DL
UROBILINOGEN, POC UA: NORMAL
WBC #/AREA URNS AUTO: 89 /HPF (ref 0–5)

## 2017-12-14 PROCEDURE — 87077 CULTURE AEROBIC IDENTIFY: CPT

## 2017-12-14 PROCEDURE — 87088 URINE BACTERIA CULTURE: CPT

## 2017-12-14 PROCEDURE — 87086 URINE CULTURE/COLONY COUNT: CPT

## 2017-12-14 PROCEDURE — 87186 SC STD MICRODIL/AGAR DIL: CPT

## 2017-12-14 PROCEDURE — 81001 URINALYSIS AUTO W/SCOPE: CPT

## 2017-12-14 PROCEDURE — 99499 UNLISTED E&M SERVICE: CPT | Mod: S$GLB,,, | Performed by: NURSE PRACTITIONER

## 2017-12-14 PROCEDURE — 81002 URINALYSIS NONAUTO W/O SCOPE: CPT | Mod: S$GLB,,, | Performed by: NURSE PRACTITIONER

## 2017-12-14 PROCEDURE — 99999 PR PBB SHADOW E&M-EST. PATIENT-LVL V: CPT | Mod: PBBFAC,,, | Performed by: NURSE PRACTITIONER

## 2017-12-14 PROCEDURE — 99213 OFFICE O/P EST LOW 20 MIN: CPT | Mod: 25,S$GLB,, | Performed by: NURSE PRACTITIONER

## 2017-12-14 NOTE — PROGRESS NOTES
Chief complaint/reason for visit:  dysuria, urinary frequency    History of present illness:  84-year-old female with daughter complains of dysuria,  urinary frequency & abdominal pressure on & off for the past week. Patient admits feels like a bladder infection. Patient denies any back pain, fever & vaginal d/c.  Patient tried over-the-counter medications with slight Relief.  Patient admits use gain detergent and some scented bath soaps.     Past Medical History:   Diagnosis Date    Anemia     Aortic valve disorders     Arthritis     Benign hyperten heart disease w/ CHF     Cardiac pacemaker 10/10/2013    Carotid artery occlusion     Chronic renal insufficiency, stage III (moderate) 10/10/2013    Depression     Hyperlipidemia     Hypertension     Kidney cysts     right US renal artery 9/2013    Obesity     Orbital mass     Pneumonia     as a child, required hospitalization    Right bundle branch block     Sinoatrial node dysfunction     Thyroid disease     goiter    Urinary incontinence          Past Surgical History:   Procedure Laterality Date    ADENOIDECTOMY      APPENDECTOMY      CHOLECYSTECTOMY      HEMORRHOID SURGERY      HYSTERECTOMY      RAHUL; benign reasons    INSERT / REPLACE / REMOVE PACEMAKER  09/25/2008    [employer]    KNEE SURGERY      right    TONSILLECTOMY              Social History     Social History    Marital status:      Spouse name: N/A    Number of children: N/A    Years of education: N/A     Occupational History    Not on file.     Social History Main Topics    Smoking status: Never Smoker    Smokeless tobacco: Never Used    Alcohol use No    Drug use: No    Sexual activity: No     Other Topics Concern    Not on file     Social History Narrative    No narrative on file       Family History   Problem Relation Age of Onset    Hypertension Mother     Kidney disease Mother     Heart disease Brother     Hyperlipidemia Brother     Hypertension  Brother     Diabetes Cousin     Cancer Neg Hx     COPD Neg Hx     Stroke Neg Hx        ROS:  Gen.: Denies fatigue, weight loss  Skin:  Denies  no rashes, itching or changes in skin color or texture  HEENT: Denies headache, nasal congestion, sneezing  Nodes: No masses or lesions  Chest: Denies cyanosis, wheezing, cough and sputum production  Cardiovascular: Denies chest pain, shortness of breath  Abdomen: Reports slight abdominal pain  Urinary: Reports dysuria and urinary frequency  Musculoskeletal: no joint stiffness, swelling. Denies back pain  Neurologic: History of seizures, paralysis, alteration of gait or coordination  Psychiatric: Denies mood swings, depression or suicidal    PE:  Appearance: Well-nourished, well-developed, in mild distress, hard of hearing  V/S: Reviewed.  Skin: No masses, rashes or lesions  HEENT: turbinates pink, Mucus membranes okay, TM's clear bilateral   Chest: Lungs clear to auscultation.  Cardiovascular: Regular rate and rhythm.  Abdomen: Soft with no supra pubic tenderness, with active bowel sounds, no CVA tenderness  Musculoskeletal: Motor: 5/5 strength major flexors/extensors.  Neurologic: No sensory deficits. Gait and posture: Normal, No cerebellar signs.   Mental status: Patient awake, alert and oriented    Plan:  Lab work POCT UA, C&S   Advise increase p.o. fluids-- water/juice & rest  Practice good handwashing.  Advise follow up with PCP  Advise unscented soaps  Advise follow up with PCP  Advise go to ER if nausea, vomiting, fever, increased back pain, or fail to improve with treatment.  AVS provided and reviewed with patient including supportive care, follow up, and red flag symptoms.   Patient verbalizes understanding and agrees with treatment plan. Discharged from Urgent Care in stable condition.    Diagnosis:  Dysuria   Hypertension  Urinary frequency   Chronic kidney disease

## 2017-12-15 RX ORDER — ALLOPURINOL 100 MG/1
TABLET ORAL
Qty: 180 TABLET | Refills: 0 | Status: SHIPPED | OUTPATIENT
Start: 2017-12-15 | End: 2018-03-16 | Stop reason: SDUPTHER

## 2017-12-15 RX ORDER — DILTIAZEM HYDROCHLORIDE 240 MG/1
CAPSULE, COATED, EXTENDED RELEASE ORAL
Qty: 90 CAPSULE | Refills: 2 | Status: SHIPPED | OUTPATIENT
Start: 2017-12-15

## 2017-12-15 RX ORDER — FUROSEMIDE 40 MG/1
TABLET ORAL
Qty: 90 TABLET | Refills: 0 | Status: SHIPPED | OUTPATIENT
Start: 2017-12-15 | End: 2018-03-16 | Stop reason: SDUPTHER

## 2017-12-18 DIAGNOSIS — N39.0 UTI (URINARY TRACT INFECTION), UNCOMPLICATED: Primary | ICD-10-CM

## 2017-12-18 DIAGNOSIS — N39.0 UTI (URINARY TRACT INFECTION), UNCOMPLICATED: ICD-10-CM

## 2017-12-18 DIAGNOSIS — R30.0 DYSURIA: Primary | ICD-10-CM

## 2017-12-18 LAB — BACTERIA UR CULT: NORMAL

## 2017-12-18 RX ORDER — DOXYCYCLINE 100 MG/1
100 CAPSULE ORAL 2 TIMES DAILY
Qty: 14 CAPSULE | Refills: 0 | Status: SHIPPED | OUTPATIENT
Start: 2017-12-18 | End: 2017-12-25

## 2017-12-18 RX ORDER — CIPROFLOXACIN 250 MG/1
250 TABLET, FILM COATED ORAL 2 TIMES DAILY
Qty: 20 TABLET | Refills: 0 | Status: SHIPPED | OUTPATIENT
Start: 2017-12-18 | End: 2017-12-18

## 2018-01-10 ENCOUNTER — CLINICAL SUPPORT (OUTPATIENT)
Dept: CARDIOLOGY | Facility: CLINIC | Age: 83
End: 2018-01-10
Attending: INTERNAL MEDICINE
Payer: MEDICARE

## 2018-01-10 DIAGNOSIS — I49.5 SINOATRIAL NODE DYSFUNCTION: ICD-10-CM

## 2018-01-10 DIAGNOSIS — Z95.0 CARDIAC PACEMAKER IN SITU: ICD-10-CM

## 2018-01-10 PROCEDURE — 93280 PM DEVICE PROGR EVAL DUAL: CPT | Mod: S$GLB,,, | Performed by: INTERNAL MEDICINE

## 2018-01-11 DIAGNOSIS — Z95.0 CARDIAC PACEMAKER IN SITU: Primary | ICD-10-CM

## 2018-01-11 DIAGNOSIS — I49.5 SSS (SICK SINUS SYNDROME): ICD-10-CM

## 2018-03-19 RX ORDER — ALLOPURINOL 100 MG/1
TABLET ORAL
Qty: 180 TABLET | Refills: 0 | Status: SHIPPED | OUTPATIENT
Start: 2018-03-19

## 2018-03-19 RX ORDER — FUROSEMIDE 40 MG/1
TABLET ORAL
Qty: 90 TABLET | Refills: 0 | Status: SHIPPED | OUTPATIENT
Start: 2018-03-19

## 2018-03-22 ENCOUNTER — PATIENT MESSAGE (OUTPATIENT)
Dept: INTERNAL MEDICINE | Facility: CLINIC | Age: 83
End: 2018-03-22

## 2018-04-12 PROBLEM — I70.0 CALCIFICATION OF AORTA: Status: ACTIVE | Noted: 2018-04-12

## 2018-04-16 ENCOUNTER — OFFICE VISIT (OUTPATIENT)
Dept: CARDIOLOGY | Facility: CLINIC | Age: 83
End: 2018-04-16
Payer: MEDICARE

## 2018-04-16 ENCOUNTER — TELEPHONE (OUTPATIENT)
Dept: OPHTHALMOLOGY | Facility: CLINIC | Age: 83
End: 2018-04-16

## 2018-04-16 ENCOUNTER — OFFICE VISIT (OUTPATIENT)
Dept: INTERNAL MEDICINE | Facility: CLINIC | Age: 83
End: 2018-04-16
Payer: MEDICARE

## 2018-04-16 VITALS
HEART RATE: 60 BPM | HEIGHT: 64 IN | DIASTOLIC BLOOD PRESSURE: 76 MMHG | WEIGHT: 201.06 LBS | BODY MASS INDEX: 34.33 KG/M2 | SYSTOLIC BLOOD PRESSURE: 132 MMHG

## 2018-04-16 VITALS
SYSTOLIC BLOOD PRESSURE: 118 MMHG | HEART RATE: 77 BPM | DIASTOLIC BLOOD PRESSURE: 64 MMHG | HEIGHT: 64 IN | OXYGEN SATURATION: 95 % | BODY MASS INDEX: 34.33 KG/M2 | WEIGHT: 201.06 LBS

## 2018-04-16 DIAGNOSIS — R20.0 NUMBNESS AND TINGLING: ICD-10-CM

## 2018-04-16 DIAGNOSIS — I11.0 BENIGN HYPERTENSIVE HEART DISEASE WITH HEART FAILURE: ICD-10-CM

## 2018-04-16 DIAGNOSIS — I35.9 AORTIC VALVE DISORDER: ICD-10-CM

## 2018-04-16 DIAGNOSIS — R00.2 PALPITATION: ICD-10-CM

## 2018-04-16 DIAGNOSIS — N18.30 CKD (CHRONIC KIDNEY DISEASE) STAGE 3, GFR 30-59 ML/MIN: ICD-10-CM

## 2018-04-16 DIAGNOSIS — R41.3 MEMORY DIFFICULTIES: ICD-10-CM

## 2018-04-16 DIAGNOSIS — H91.90 HEARING DIFFICULTY, UNSPECIFIED LATERALITY: ICD-10-CM

## 2018-04-16 DIAGNOSIS — M85.80 OSTEOPENIA, UNSPECIFIED LOCATION: ICD-10-CM

## 2018-04-16 DIAGNOSIS — R49.0 HOARSENESS: ICD-10-CM

## 2018-04-16 DIAGNOSIS — I11.0 HYPERTENSIVE HEART DISEASE WITH HEART FAILURE: ICD-10-CM

## 2018-04-16 DIAGNOSIS — I70.0 CALCIFICATION OF AORTA: ICD-10-CM

## 2018-04-16 DIAGNOSIS — M10.9 GOUT, UNSPECIFIED CAUSE, UNSPECIFIED CHRONICITY, UNSPECIFIED SITE: ICD-10-CM

## 2018-04-16 DIAGNOSIS — R15.9 INCONTINENCE OF FECES, UNSPECIFIED FECAL INCONTINENCE TYPE: ICD-10-CM

## 2018-04-16 DIAGNOSIS — R20.2 NUMBNESS AND TINGLING: ICD-10-CM

## 2018-04-16 DIAGNOSIS — M19.90 ARTHRITIS: ICD-10-CM

## 2018-04-16 DIAGNOSIS — E66.9 OBESITY (BMI 30.0-34.9): ICD-10-CM

## 2018-04-16 DIAGNOSIS — F33.9 MAJOR DEPRESSION, RECURRENT, CHRONIC: ICD-10-CM

## 2018-04-16 DIAGNOSIS — Z95.0 CARDIAC PACEMAKER: ICD-10-CM

## 2018-04-16 DIAGNOSIS — I77.9 CAROTID ARTERY DISEASE, UNSPECIFIED LATERALITY: ICD-10-CM

## 2018-04-16 DIAGNOSIS — D64.9 ANEMIA, UNSPECIFIED TYPE: ICD-10-CM

## 2018-04-16 DIAGNOSIS — E78.5 HYPERLIPIDEMIA, UNSPECIFIED HYPERLIPIDEMIA TYPE: ICD-10-CM

## 2018-04-16 DIAGNOSIS — Z86.79 HISTORY OF PULMONARY HYPERTENSION: ICD-10-CM

## 2018-04-16 DIAGNOSIS — I10 ESSENTIAL HYPERTENSION: ICD-10-CM

## 2018-04-16 DIAGNOSIS — Z91.81 AT RISK FOR FALLS: ICD-10-CM

## 2018-04-16 DIAGNOSIS — I50.33 ACUTE ON CHRONIC DIASTOLIC CONGESTIVE HEART FAILURE: Primary | ICD-10-CM

## 2018-04-16 DIAGNOSIS — Z86.39 HISTORY OF HYPERPARATHYROIDISM: ICD-10-CM

## 2018-04-16 DIAGNOSIS — Z95.0 PACEMAKER: ICD-10-CM

## 2018-04-16 DIAGNOSIS — I35.0 AORTIC VALVE STENOSIS, ETIOLOGY OF CARDIAC VALVE DISEASE UNSPECIFIED: ICD-10-CM

## 2018-04-16 DIAGNOSIS — Z00.00 ENCOUNTER FOR PREVENTIVE HEALTH EXAMINATION: Primary | ICD-10-CM

## 2018-04-16 DIAGNOSIS — R32 URINARY INCONTINENCE, UNSPECIFIED TYPE: ICD-10-CM

## 2018-04-16 PROCEDURE — 3078F DIAST BP <80 MM HG: CPT | Mod: CPTII,S$GLB,, | Performed by: NURSE PRACTITIONER

## 2018-04-16 PROCEDURE — 99499 UNLISTED E&M SERVICE: CPT | Mod: S$GLB,,, | Performed by: INTERNAL MEDICINE

## 2018-04-16 PROCEDURE — G0439 PPPS, SUBSEQ VISIT: HCPCS | Mod: S$GLB,,, | Performed by: NURSE PRACTITIONER

## 2018-04-16 PROCEDURE — 3074F SYST BP LT 130 MM HG: CPT | Mod: CPTII,S$GLB,, | Performed by: NURSE PRACTITIONER

## 2018-04-16 PROCEDURE — 3078F DIAST BP <80 MM HG: CPT | Mod: CPTII,S$GLB,, | Performed by: INTERNAL MEDICINE

## 2018-04-16 PROCEDURE — 99214 OFFICE O/P EST MOD 30 MIN: CPT | Mod: S$GLB,,, | Performed by: INTERNAL MEDICINE

## 2018-04-16 PROCEDURE — 99499 UNLISTED E&M SERVICE: CPT | Mod: S$GLB,,, | Performed by: NURSE PRACTITIONER

## 2018-04-16 PROCEDURE — 99999 PR PBB SHADOW E&M-EST. PATIENT-LVL III: CPT | Mod: PBBFAC,,, | Performed by: INTERNAL MEDICINE

## 2018-04-16 PROCEDURE — 3075F SYST BP GE 130 - 139MM HG: CPT | Mod: CPTII,S$GLB,, | Performed by: INTERNAL MEDICINE

## 2018-04-16 PROCEDURE — 99999 PR PBB SHADOW E&M-EST. PATIENT-LVL IV: CPT | Mod: PBBFAC,,, | Performed by: NURSE PRACTITIONER

## 2018-04-16 NOTE — TELEPHONE ENCOUNTER
----- Message from Dina Johansen MA sent at 4/16/2018  2:40 PM CDT -----  Pt is ave for annual follow-up.

## 2018-04-16 NOTE — Clinical Note
Your patient was seen today for a HRA visit. Abnormalities (BOLDED) have been identified during this visit that may require additional testing and  follow up. I have included a copy of my visit note, please review the note and feel free to contact me with any questions.  Thank you for allowing me to participate in the care of your patients.  Lorna Monroy NP

## 2018-04-16 NOTE — PATIENT INSTRUCTIONS
Counseling and Referral of Other Preventative  (Italic type indicates deductible and co-insurance are waived)    Patient Name: Diana Turner  Today's Date: 4/16/2018    Health Maintenance       Date Due Completion Date    Pneumococcal (65+) (1 of 2 - PCV13) 08/24/2020 (Originally 11/4/1998) ---    DEXA SCAN 08/10/2020 8/10/2017    Lipid Panel 07/25/2022 7/25/2017    TETANUS VACCINE 03/05/2024 3/5/2014        No orders of the defined types were placed in this encounter.    The following information is provided to all patients.  This information is to help you find resources for any of the problems found today that may be affecting your health:                Living healthy guide: www.[facility].[place].gov      Understanding Diabetes: www.diabetes.org      Eating healthy: www.cdc.gov/healthyweight      [facility] home safety checklist: www.cdc.gov/steadi/patient.html      Agency on Aging: www.goea.[place].[facility]      Alcoholics anonymous (AA): www.aa.org      Physical Activity: www.michaela.nih.gov/rl1dvyk      Tobacco use: www.quitwithusla.org

## 2018-04-16 NOTE — PROGRESS NOTES
Subjective:   Patient ID:  Diana Turner is a 84 y.o. female who presents for follow up of Edema      83 yo female, came in with her daughter for worsening CHF. Followed with Dr. Browne.  PMH SSS s/p PPM, aortic regurgitation with stenosis. CHFpEF, HTN, HLD and carotid Dz  No stroke  Lives alone and has sitter in the daytime.  In the past week, had worseing SOB and leg swelling. Family found she was not taking her meds for a week. Pt did not realize she should take meds and waiting someone to remind her.  Had lasix 40 mg bid yesterday and had large urination today.  Positive orthopnea with elevated bed, no PND  Walker dependent at home due to knee OA and imbalance.          Past Medical History:   Diagnosis Date    Anemia     Aortic valve disorders     Arthritis     Benign hypertensive heart disease with heart failure(402.11)     Cardiac pacemaker 10/10/2013    Carotid artery occlusion     Chronic renal insufficiency, stage III (moderate) 10/10/2013    Depression     History of hyperparathyroidism     Hyperlipidemia     Hypertension     Kidney cysts     right US renal artery 9/2013    Obesity     Orbital mass     Pneumonia     as a child, required hospitalization    Right bundle branch block     Sinoatrial node dysfunction     Thyroid disease     goiter    Urinary incontinence        Past Surgical History:   Procedure Laterality Date    ADENOIDECTOMY      APPENDECTOMY      CHOLECYSTECTOMY      HEMORRHOID SURGERY      HYSTERECTOMY      RAHUL; benign reasons    INSERT / REPLACE / REMOVE PACEMAKER  09/25/2008    [employer]    KNEE SURGERY      right    TONSILLECTOMY         Social History   Substance Use Topics    Smoking status: Never Smoker    Smokeless tobacco: Never Used    Alcohol use No       Family History   Problem Relation Age of Onset    Hypertension Mother     Kidney disease Mother     Heart disease Brother     Hyperlipidemia Brother     Hypertension Brother     Diabetes  Cousin     Stroke Maternal Grandmother     Cancer Neg Hx     COPD Neg Hx          Review of Systems   Constitution: Negative for decreased appetite, diaphoresis, fever, weakness, malaise/fatigue and night sweats.   HENT: Positive for hearing loss. Negative for nosebleeds.    Eyes: Negative for blurred vision and double vision.   Cardiovascular: Positive for leg swelling. Negative for chest pain, claudication, dyspnea on exertion, irregular heartbeat, near-syncope, orthopnea, palpitations, paroxysmal nocturnal dyspnea and syncope.   Respiratory: Positive for shortness of breath. Negative for cough, sleep disturbances due to breathing, snoring, sputum production and wheezing.    Endocrine: Negative for cold intolerance and polyuria.   Hematologic/Lymphatic: Does not bruise/bleed easily.   Skin: Negative for rash.   Musculoskeletal: Negative for back pain, falls, joint pain, joint swelling and neck pain.   Gastrointestinal: Negative for abdominal pain, heartburn, nausea and vomiting.   Genitourinary: Negative for dysuria, frequency and hematuria.   Neurological: Negative for difficulty with concentration, dizziness, focal weakness, headaches, light-headedness, numbness and seizures.   Psychiatric/Behavioral: Negative for depression, memory loss and substance abuse. The patient does not have insomnia.    Allergic/Immunologic: Negative for HIV exposure and hives.       Objective:   Physical Exam   Constitutional: She is oriented to person, place, and time. She appears well-nourished.   HENT:   Head: Normocephalic.   Eyes: Pupils are equal, round, and reactive to light.   Neck: Normal carotid pulses and no JVD present. Carotid bruit is not present. No thyromegaly present.   Cardiovascular: Normal rate, regular rhythm, normal heart sounds and normal pulses.   No extrasystoles are present. PMI is not displaced.  Exam reveals no gallop and no S3.    No murmur heard.  Pulmonary/Chest: Breath sounds normal. No stridor. No  respiratory distress.   Rales on bases   Abdominal: Soft. Bowel sounds are normal. There is no tenderness. There is no rebound.   Musculoskeletal: Normal range of motion. She exhibits edema.   2+   Neurological: She is alert and oriented to person, place, and time.   Skin: Skin is intact. No rash noted.   Psychiatric: Her behavior is normal.       Lab Results   Component Value Date    CHOL 213 (H) 07/25/2017    CHOL 179 09/11/2016    CHOL 198 07/18/2016     Lab Results   Component Value Date    HDL 41 07/25/2017    HDL 32 (L) 09/11/2016    HDL 42 07/18/2016     Lab Results   Component Value Date    LDLCALC 148.0 07/25/2017    LDLCALC 129.4 09/11/2016    LDLCALC 141.0 07/18/2016     Lab Results   Component Value Date    TRIG 120 07/25/2017    TRIG 88 09/11/2016    TRIG 75 07/18/2016     Lab Results   Component Value Date    CHOLHDL 19.2 (L) 07/25/2017    CHOLHDL 17.9 (L) 09/11/2016    CHOLHDL 21.2 07/18/2016       Chemistry        Component Value Date/Time     08/21/2017 0714    K 4.2 08/21/2017 0714     08/21/2017 0714    CO2 25 08/21/2017 0714    BUN 20 08/21/2017 0714    CREATININE 1.5 (H) 08/21/2017 0714    GLU 89 08/21/2017 0714        Component Value Date/Time    CALCIUM 9.5 08/21/2017 0714    ALKPHOS 94 07/25/2017 1041    AST 16 07/25/2017 1041    ALT 8 (L) 07/25/2017 1041    BILITOT 0.5 07/25/2017 1041    ESTGFRAFRICA 37 (A) 08/21/2017 0714    EGFRNONAA 32 (A) 08/21/2017 0714          Lab Results   Component Value Date    TSH 0.507 09/11/2016     Lab Results   Component Value Date    INR 1.0 08/10/2017    INR 1.0 09/10/2016    INR 1.0 12/13/2014     Lab Results   Component Value Date    WBC 6.20 08/21/2017    HGB 12.2 08/21/2017    HCT 36.3 (L) 08/21/2017     (H) 08/21/2017     08/21/2017     BMP  Sodium   Date Value Ref Range Status   08/21/2017 140 136 - 145 mmol/L Final     Potassium   Date Value Ref Range Status   08/21/2017 4.2 3.5 - 5.1 mmol/L Final     Chloride   Date Value  Ref Range Status   08/21/2017 103 95 - 110 mmol/L Final     CO2   Date Value Ref Range Status   08/21/2017 25 23 - 29 mmol/L Final     BUN, Bld   Date Value Ref Range Status   08/21/2017 20 8 - 23 mg/dL Final     Creatinine   Date Value Ref Range Status   08/21/2017 1.5 (H) 0.5 - 1.4 mg/dL Final     Calcium   Date Value Ref Range Status   08/21/2017 9.5 8.7 - 10.5 mg/dL Final     Anion Gap   Date Value Ref Range Status   08/21/2017 12 8 - 16 mmol/L Final     eGFR if    Date Value Ref Range Status   08/21/2017 37 (A) >60 mL/min/1.73 m^2 Final     eGFR if non    Date Value Ref Range Status   08/21/2017 32 (A) >60 mL/min/1.73 m^2 Final     Comment:     Calculation used to obtain the estimated glomerular filtration  rate (eGFR) is the CKD-EPI equation. Since race is unknown   in our information system, the eGFR values for   -American and Non--American patients are given   for each creatinine result.       CrCl cannot be calculated (Patient's most recent lab result is older than the maximum 7 days allowed.).     Assessment:      1. Aortic valve disorder    2. Acute on chronic diastolic congestive heart failure    3. Benign hypertensive heart disease with heart failure    4. Cardiac pacemaker    5. CKD (chronic kidney disease) stage 3, GFR 30-59 ml/min      CHF decompensated due to missing med for a week.  She has some level of dementia and confusion.   Plan:   Echo and CXR  BNP, CBC, CMP and TSH  Advsie Lasix 40 mg bid for a week and then once a day  Daily weight and report next week  DASH and fluid restriction 50 oz  Advise to discuss with PCP and should assign POA for the further medical decision.  RTC in 3 months, sooner if indicated with Dr. Browne.

## 2018-04-16 NOTE — PROGRESS NOTES
"Diana Turner presented for a  Medicare AWV and comprehensive Health Risk Assessment today. The following components were reviewed and updated:    · Medical history  · Family History  · Social history  · Allergies and Current Medications  · Health Risk Assessment  · Health Maintenance  · Care Team     ** See Completed Assessments for Annual Wellness Visit within the encounter summary.**       The following assessments were completed:  · Living Situation  · CAGE  · Depression Screening  · Timed Get Up and Go  · Whisper Test  · Cognitive Function Screening  · Nutrition Screening  · ADL Screening  · PAQ Screening    Vitals:    04/16/18 1323   BP: 118/64   BP Location: Left arm   Patient Position: Sitting   BP Method: Large (Manual)   Pulse: 77   SpO2: 95%   Weight: 91.2 kg (201 lb 1 oz)   Height: 5' 4" (1.626 m)     Body mass index is 34.51 kg/m².  Physical Exam   Constitutional: She appears well-developed and well-nourished.   Patient in wheelchair.   Patient accompanied by step daughter and cousin, who were present throughout the visit. Step daughter aided in information gathering.     HENT:   Head: Normocephalic.   Cardiovascular: Normal rate, regular rhythm and normal heart sounds.    No murmur heard.  Pulmonary/Chest: Effort normal. No respiratory distress. She has wheezes. She has rales in the right lower field and the left lower field.   Expiratory wheezes noted.    Abdominal: Soft. There is no tenderness.   Musculoskeletal:   3+ pitting edema noted to bilateral lower extremities.    Neurological: She is alert. She exhibits normal muscle tone.   Oriented to person and place. Unable to give time.    Skin: Skin is warm, dry and intact.   Psychiatric: She has a normal mood and affect. Her speech is normal and behavior is normal.   Nursing note and vitals reviewed.        Diagnoses and health risks identified today and associated recommendations/orders:    1. Encounter for preventive health examination  Step daughter " "reports that patient's sitter quit, and patient had been without medications for about a week. Reports medications were started back yesterday. Discussed the importance of taking all medications as prescribed by doctor. They expressed understanding.     They have an appointment with PCP this Wednesday.     2. Carotid artery disease, unspecified laterality  CArotid US 9/10/2016---1.  Calcific atherosclerotic plaque in the bulbs of the bilateral internal carotid arteries causing a less than 50% stenosis of both left and right internal carotid arteries.  2.  Antegrade flow to the bilateral vertebral arteries.  Denies chest pains, lightheadedness, dizziness, near syncope, or syncope.    Discussed s/s of MI and stroke (patient denies any s/s at this time) and advised to go to the ER if occur.  They expressed understanding.   Stable. Continue current treatment plan as previously prescribed with your cardiologist, Dr. Browne.     3. Hypertensive heart disease with heart failure  Echo 9/11/2016---CONCLUSIONS     1 - Biatrial enlargement.     2 - Normal left ventricular systolic function (EF 55-60%).     3 - Left ventricular diastolic dysfunction.     4 - Normal right ventricular systolic function .     5 - The estimated PA systolic pressure is 39 mmHg.     6 - Moderate to severe aortic stenosis.     7 - Moderate aortic regurgitation.     8 - Trivial mitral regurgitation.     9 - Trivial tricuspid regurgitation.     Stepdaughter reports patient was extremely swollen including face yesterday and SOB. Reports she could hear her "gurgling". Reports she is having to sleep in a recliner. Reports Lasix was resumed. Stepdaughter reports she can see some improvement in swelling and breathing today. Patient reports she is still SOB on exertion. Stepdaughter and cousin report they could barely get patient in car to transfer her here due to her shortness of breath. Reports they contemplated on taking her to the ER. Patient lives alone, " although another stepdaughter will be spending the week with her.  Patient is in no acute distress at visit, but still has 3+ BLE edema, as well as lower lobe rales. She is still complaining of SOB on exertion. MA was able to schedule an appointment with cardiology today after visit. Discussed with patient and family, and they are in agreement with going to see cardiology for further evaluation and discussion of treatment plan.   Discussed signs and symptoms of acute heart failure with patient and family and advised to seek immediate medical attention if occur. They expressed understanding.     4. Aortic valve stenosis, etiology of cardiac valve disease unspecified  Echo 9/11/2016---CONCLUSIONS     6 - Moderate to severe aortic stenosis.     7 - Moderate aortic regurgitation.   Stable. Continue current treatment plan as previously prescribed with your cardiologist.     5. Palpitation  Reports intermittent palpitations.  Advised to follow up with cardiologist for further evaluation and recommendations. They expressed understanding.      6. Essential hypertension  Stable and controlled. Continue current treatment plan as previously prescribed with your PCP and cardiologist.     7. Hyperlipidemia, unspecified hyperlipidemia type  Not at goal. Continue current treatment plan as previously prescribed with your PCP and cardiologist.     8. Calcification of aorta  CXR 8/10/2017---Aorta is mildly tortuous and calcified.   Discussed diagnosis and risk reduction.  Stable. Continue current treatment plan as previously prescribed with your cardiologist.      9. History of pulmonary hypertension  Echo 9/11/2016---CONCLUSIONS     5 - The estimated PA systolic pressure is 39 mmHg.     ECHO 9/2013---  CONCLUSIONS   5 - Pulmonary hypertension.   Stable and controlled. Continue current treatment plan as previously prescribed with your cardiologist.     10. Pacemaker  EP Lab 8/22/2017---B. Summary/Post-Operative Diagnosis  Successful  generator change.  [employer]tr[employer] device.  Continue current treatment plan as previously prescribed with your cardiologist.     11. Major depression, recurrent, chronic  Reports has been dealing with depression on and off for years. She does not feel Celexa controls depression. Discussed the importance of not abruptly stopping medication to first consult with PCP. They expressed understanding.   PHQ 2-2. Denies SI.   Advised to follow up with PCP for further evaluation and treatment. They expressed understanding.      12. CKD (chronic kidney disease) stage 3, GFR 30-59 ml/min  Advised to avoid NSAIDs.   Overdue for follow up with nephrologist, Dr. Benson. MA to schedule.   Stable. Continue current treatment plan as previously prescribed with your PCP and nephrologist, Dr. Benson.     13. Anemia, unspecified type  Denies hemoptysis, hematemesis, hematuria, epistaxis, hematochezia, or melena.   Improved. Continue current treatment plan as previously prescribed with your PCP    14. History of hyperparathyroidism  PTH, intact 37.0   Stable and controlled. Continue current treatment plan as previously prescribed with your nephrologist.     15. Gout, unspecified cause, unspecified chronicity, unspecified site  On Allopurinol.   Denies any recent flare ups.   Stable and controlled. Continue current treatment plan as previously prescribed with your nephrologist.     16. At risk for falls  Reports 2+ falls in the last 12 months. Uses a walker with ambulation. Today patient is in wheelchair.   She lives alone.   Fall precautions reviewed with patient.   Advised to follow up with PCP for further recommendations. They expressed understanding.      17. Arthritis  Per patient stable.   Stable and controlled. Continue current treatment plan as previously prescribed with your PCP.     18. Osteopenia, unspecified location  DEXA 8/10/2017  Continue current treatment plan as previously prescribed with your PCP.      19. Obesity (BMI  30.0-34.9)  Encouraged healthy diet and exercise as tolerated to help bring BMI into normal range.   Continue current treatment plan as previously prescribed with your PCP.     20. Urinary incontinence, unspecified type  Reports occasional bowel and bladder incontinence. Wears depends.   Step daughter reports UI is getting worse, patient denies any worsening.   Advised to follow up with PCP for further evaluation and recommendations. They expressed understanding.      21. Incontinence of feces, unspecified fecal incontinence type  See #20    22. Memory difficulties  Patient and family both report memory difficulties. Patient currently lives alone. She does have a life alert and reports she can call 911 if needed. She had a sitter whom recently quit, so they are in the process of hiring another one. Step daughter reports patient needs 24/7 care. Patient is unable to complete some ADLs independently. See ADL, PAQ, and Functional/cognitive screenings that were completed in visit.   Abnormal cognitive function screening (3).   Patient's other step daughter is staying with her this week.   Advised to follow up with PCP for further evaluation and recommendations. They expressed understanding.      23. Hearing difficulty, unspecified laterality  Reports difficulty hearing.   Abnormal whisper test.   Advised to follow up with PCP for further evaluation and recommendations. They expressed understanding.      24. Numbness and tingling  Reports intermittent numbness and tingling in bilateral lower extremities. She is unsure how long been ongoing but denies any worsening.   Patient is also anemic.   Encouraged daily foot inspections.   Advised to follow up with PCP for further evaluation and treatment. They expressed understanding.      25. Hoarseness  Reports intermittent hoarseness that has been ongoing for years.   Advised to follow up with PCP for further evaluation and treatment. They expressed understanding.         Provided Diana with a 5-10 year written screening schedule and personal prevention plan. Education, counseling, and referrals were provided as needed. After Visit Summary printed and given to patient which includes a list of additional screenings\tests needed.    Follow-up in about 1 year (around 4/16/2019) for AWV.    Lorna Monroy NP

## 2018-04-17 ENCOUNTER — PATIENT MESSAGE (OUTPATIENT)
Dept: INTERNAL MEDICINE | Facility: CLINIC | Age: 83
End: 2018-04-17

## 2018-04-17 ENCOUNTER — HOSPITAL ENCOUNTER (INPATIENT)
Facility: HOSPITAL | Age: 83
LOS: 3 days | Discharge: NURSING, REHAB OR LONG-TERM CARE | DRG: 291 | End: 2018-04-20
Attending: EMERGENCY MEDICINE | Admitting: HOSPITALIST
Payer: MEDICARE

## 2018-04-17 DIAGNOSIS — R06.03 RESPIRATORY DISTRESS: Primary | ICD-10-CM

## 2018-04-17 DIAGNOSIS — R07.9 CHEST PAIN: ICD-10-CM

## 2018-04-17 DIAGNOSIS — I50.9 CONGESTIVE HEART FAILURE, UNSPECIFIED CONGESTIVE HEART FAILURE CHRONICITY, UNSPECIFIED CONGESTIVE HEART FAILURE TYPE: ICD-10-CM

## 2018-04-17 DIAGNOSIS — I50.33 ACUTE ON CHRONIC DIASTOLIC CONGESTIVE HEART FAILURE: ICD-10-CM

## 2018-04-17 DIAGNOSIS — R79.89 ELEVATED TROPONIN: ICD-10-CM

## 2018-04-17 DIAGNOSIS — N19 RENAL FAILURE, UNSPECIFIED CHRONICITY: ICD-10-CM

## 2018-04-17 DIAGNOSIS — I35.0 AORTIC STENOSIS: ICD-10-CM

## 2018-04-17 DIAGNOSIS — J18.9 PNEUMONIA DUE TO INFECTIOUS ORGANISM, UNSPECIFIED LATERALITY, UNSPECIFIED PART OF LUNG: ICD-10-CM

## 2018-04-17 DIAGNOSIS — J96.00 ACUTE RESPIRATORY FAILURE: ICD-10-CM

## 2018-04-17 PROBLEM — N18.9 ACUTE ON CHRONIC RENAL FAILURE: Status: ACTIVE | Noted: 2018-04-17

## 2018-04-17 PROBLEM — I49.5 SA NODE DYSFUNCTION: Status: ACTIVE | Noted: 2018-04-17

## 2018-04-17 PROBLEM — N17.9 ACUTE ON CHRONIC RENAL FAILURE: Status: ACTIVE | Noted: 2018-04-17

## 2018-04-17 LAB
ALBUMIN SERPL BCP-MCNC: 2.6 G/DL
ALBUMIN SERPL BCP-MCNC: 3.3 G/DL
ALLENS TEST: ABNORMAL
ALP SERPL-CCNC: 93 U/L
ALT SERPL W/O P-5'-P-CCNC: 24 U/L
ANION GAP SERPL CALC-SCNC: 12 MMOL/L
ANION GAP SERPL CALC-SCNC: 15 MMOL/L
APTT BLDCRRT: 26.4 SEC
AST SERPL-CCNC: 37 U/L
BASOPHILS # BLD AUTO: 0 K/UL
BASOPHILS # BLD AUTO: 0.01 K/UL
BASOPHILS NFR BLD: 0 %
BASOPHILS NFR BLD: 0.1 %
BILIRUB SERPL-MCNC: 1.5 MG/DL
BILIRUB UR QL STRIP: NEGATIVE
BNP SERPL-MCNC: 3526 PG/ML
BUN SERPL-MCNC: 48 MG/DL
BUN SERPL-MCNC: 51 MG/DL
CALCIUM SERPL-MCNC: 8.4 MG/DL
CALCIUM SERPL-MCNC: 9.2 MG/DL
CHLORIDE SERPL-SCNC: 97 MMOL/L
CHLORIDE SERPL-SCNC: 99 MMOL/L
CLARITY UR: CLEAR
CO2 SERPL-SCNC: 20 MMOL/L
CO2 SERPL-SCNC: 23 MMOL/L
COLOR UR: YELLOW
CREAT SERPL-MCNC: 2 MG/DL
CREAT SERPL-MCNC: 2.1 MG/DL
DACRYOCYTES BLD QL SMEAR: ABNORMAL
DELSYS: ABNORMAL
DIFFERENTIAL METHOD: ABNORMAL
DIFFERENTIAL METHOD: ABNORMAL
EOSINOPHIL # BLD AUTO: 0 K/UL
EOSINOPHIL # BLD AUTO: 0 K/UL
EOSINOPHIL NFR BLD: 0 %
EOSINOPHIL NFR BLD: 0 %
EP: 6
ERYTHROCYTE [DISTWIDTH] IN BLOOD BY AUTOMATED COUNT: 14.8 %
ERYTHROCYTE [DISTWIDTH] IN BLOOD BY AUTOMATED COUNT: 14.8 %
ERYTHROCYTE [SEDIMENTATION RATE] IN BLOOD BY WESTERGREN METHOD: 18 MM/H
EST. GFR  (AFRICAN AMERICAN): 24 ML/MIN/1.73 M^2
EST. GFR  (AFRICAN AMERICAN): 26 ML/MIN/1.73 M^2
EST. GFR  (NON AFRICAN AMERICAN): 21 ML/MIN/1.73 M^2
EST. GFR  (NON AFRICAN AMERICAN): 22 ML/MIN/1.73 M^2
ESTIMATED AVG GLUCOSE: 105 MG/DL
FIO2: 35
GLUCOSE SERPL-MCNC: 146 MG/DL
GLUCOSE SERPL-MCNC: 180 MG/DL
GLUCOSE UR QL STRIP: NEGATIVE
HBA1C MFR BLD HPLC: 5.3 %
HCO3 UR-SCNC: 21.3 MMOL/L (ref 24–28)
HCT VFR BLD AUTO: 27.1 %
HCT VFR BLD AUTO: 32.6 %
HGB BLD-MCNC: 11.2 G/DL
HGB BLD-MCNC: 9.2 G/DL
HGB UR QL STRIP: NEGATIVE
HYPOCHROMIA BLD QL SMEAR: ABNORMAL
INR PPP: 1.1
IP: 12
KETONES UR QL STRIP: NEGATIVE
LACTATE SERPL-SCNC: 1.2 MMOL/L
LACTATE SERPL-SCNC: 2.4 MMOL/L
LEUKOCYTE ESTERASE UR QL STRIP: NEGATIVE
LYMPHOCYTES # BLD AUTO: 0.5 K/UL
LYMPHOCYTES # BLD AUTO: 0.6 K/UL
LYMPHOCYTES NFR BLD: 5 %
LYMPHOCYTES NFR BLD: 6.7 %
MAGNESIUM SERPL-MCNC: 1.6 MG/DL
MAGNESIUM SERPL-MCNC: 1.7 MG/DL
MCH RBC QN AUTO: 36.1 PG
MCH RBC QN AUTO: 36.5 PG
MCHC RBC AUTO-ENTMCNC: 33.9 G/DL
MCHC RBC AUTO-ENTMCNC: 34.4 G/DL
MCV RBC AUTO: 106 FL
MCV RBC AUTO: 106 FL
MODE: ABNORMAL
MONOCYTES # BLD AUTO: 0.7 K/UL
MONOCYTES # BLD AUTO: 0.8 K/UL
MONOCYTES NFR BLD: 7.6 %
MONOCYTES NFR BLD: 8.9 %
NEUTROPHILS # BLD AUTO: 7.2 K/UL
NEUTROPHILS # BLD AUTO: 8.5 K/UL
NEUTROPHILS NFR BLD: 84.4 %
NEUTROPHILS NFR BLD: 87.6 %
NITRITE UR QL STRIP: NEGATIVE
OVALOCYTES BLD QL SMEAR: ABNORMAL
PCO2 BLDA: 30.9 MMHG (ref 35–45)
PH SMN: 7.45 [PH] (ref 7.35–7.45)
PH UR STRIP: 5 [PH] (ref 5–8)
PHOSPHATE SERPL-MCNC: 4.2 MG/DL
PHOSPHATE SERPL-MCNC: 4.9 MG/DL
PHOSPHATE SERPL-MCNC: 4.9 MG/DL
PLATELET # BLD AUTO: 171 K/UL
PLATELET # BLD AUTO: 224 K/UL
PLATELET BLD QL SMEAR: ABNORMAL
PMV BLD AUTO: 10.3 FL
PMV BLD AUTO: 10.8 FL
PO2 BLDA: 69 MMHG (ref 80–100)
POC BE: -3 MMOL/L
POC SATURATED O2: 95 % (ref 95–100)
POCT GLUCOSE: 129 MG/DL (ref 70–110)
POIKILOCYTOSIS BLD QL SMEAR: SLIGHT
POLYCHROMASIA BLD QL SMEAR: ABNORMAL
POTASSIUM SERPL-SCNC: 3.9 MMOL/L
POTASSIUM SERPL-SCNC: 4.4 MMOL/L
PROCALCITONIN SERPL IA-MCNC: 1.58 NG/ML
PROT SERPL-MCNC: 7.4 G/DL
PROT UR QL STRIP: NEGATIVE
PROTHROMBIN TIME: 11.2 SEC
RBC # BLD AUTO: 2.55 M/UL
RBC # BLD AUTO: 3.07 M/UL
SAMPLE: ABNORMAL
SITE: ABNORMAL
SODIUM SERPL-SCNC: 132 MMOL/L
SODIUM SERPL-SCNC: 134 MMOL/L
SP GR UR STRIP: 1.02 (ref 1–1.03)
TROPONIN I SERPL DL<=0.01 NG/ML-MCNC: 0.29 NG/ML
TROPONIN I SERPL DL<=0.01 NG/ML-MCNC: 0.38 NG/ML
TROPONIN I SERPL DL<=0.01 NG/ML-MCNC: 0.39 NG/ML
URN SPEC COLLECT METH UR: NORMAL
UROBILINOGEN UR STRIP-ACNC: NEGATIVE EU/DL
WBC # BLD AUTO: 8.47 K/UL
WBC # BLD AUTO: 9.77 K/UL

## 2018-04-17 PROCEDURE — 25000003 PHARM REV CODE 250: Performed by: HOSPITALIST

## 2018-04-17 PROCEDURE — 84484 ASSAY OF TROPONIN QUANT: CPT | Mod: 91

## 2018-04-17 PROCEDURE — 96365 THER/PROPH/DIAG IV INF INIT: CPT

## 2018-04-17 PROCEDURE — G8987 SELF CARE CURRENT STATUS: HCPCS | Mod: CN

## 2018-04-17 PROCEDURE — 97530 THERAPEUTIC ACTIVITIES: CPT

## 2018-04-17 PROCEDURE — 99285 EMERGENCY DEPT VISIT HI MDM: CPT | Mod: 25

## 2018-04-17 PROCEDURE — G8979 MOBILITY GOAL STATUS: HCPCS | Mod: CK

## 2018-04-17 PROCEDURE — 63600175 PHARM REV CODE 636 W HCPCS: Performed by: EMERGENCY MEDICINE

## 2018-04-17 PROCEDURE — 99291 CRITICAL CARE FIRST HOUR: CPT | Mod: ,,, | Performed by: INTERNAL MEDICINE

## 2018-04-17 PROCEDURE — 83880 ASSAY OF NATRIURETIC PEPTIDE: CPT

## 2018-04-17 PROCEDURE — 83605 ASSAY OF LACTIC ACID: CPT | Mod: 91

## 2018-04-17 PROCEDURE — 97166 OT EVAL MOD COMPLEX 45 MIN: CPT

## 2018-04-17 PROCEDURE — 87040 BLOOD CULTURE FOR BACTERIA: CPT | Mod: 59

## 2018-04-17 PROCEDURE — 27000190 HC CPAP FULL FACE MASK W/VALVE

## 2018-04-17 PROCEDURE — 87086 URINE CULTURE/COLONY COUNT: CPT

## 2018-04-17 PROCEDURE — 83605 ASSAY OF LACTIC ACID: CPT

## 2018-04-17 PROCEDURE — 51702 INSERT TEMP BLADDER CATH: CPT

## 2018-04-17 PROCEDURE — 81003 URINALYSIS AUTO W/O SCOPE: CPT

## 2018-04-17 PROCEDURE — 85025 COMPLETE CBC W/AUTO DIFF WBC: CPT

## 2018-04-17 PROCEDURE — 20000000 HC ICU ROOM

## 2018-04-17 PROCEDURE — 80069 RENAL FUNCTION PANEL: CPT

## 2018-04-17 PROCEDURE — 85730 THROMBOPLASTIN TIME PARTIAL: CPT

## 2018-04-17 PROCEDURE — 85610 PROTHROMBIN TIME: CPT

## 2018-04-17 PROCEDURE — 36415 COLL VENOUS BLD VENIPUNCTURE: CPT

## 2018-04-17 PROCEDURE — 97162 PT EVAL MOD COMPLEX 30 MIN: CPT

## 2018-04-17 PROCEDURE — 83735 ASSAY OF MAGNESIUM: CPT | Mod: 91

## 2018-04-17 PROCEDURE — 97535 SELF CARE MNGMENT TRAINING: CPT

## 2018-04-17 PROCEDURE — 97802 MEDICAL NUTRITION INDIV IN: CPT

## 2018-04-17 PROCEDURE — 63600175 PHARM REV CODE 636 W HCPCS: Performed by: HOSPITALIST

## 2018-04-17 PROCEDURE — 96367 TX/PROPH/DG ADDL SEQ IV INF: CPT

## 2018-04-17 PROCEDURE — 84145 PROCALCITONIN (PCT): CPT

## 2018-04-17 PROCEDURE — 84484 ASSAY OF TROPONIN QUANT: CPT

## 2018-04-17 PROCEDURE — G8988 SELF CARE GOAL STATUS: HCPCS | Mod: CM

## 2018-04-17 PROCEDURE — 84100 ASSAY OF PHOSPHORUS: CPT

## 2018-04-17 PROCEDURE — 25000003 PHARM REV CODE 250: Performed by: EMERGENCY MEDICINE

## 2018-04-17 PROCEDURE — 99223 1ST HOSP IP/OBS HIGH 75: CPT | Mod: ,,, | Performed by: INTERNAL MEDICINE

## 2018-04-17 PROCEDURE — 83036 HEMOGLOBIN GLYCOSYLATED A1C: CPT

## 2018-04-17 PROCEDURE — 80053 COMPREHEN METABOLIC PANEL: CPT

## 2018-04-17 PROCEDURE — 83735 ASSAY OF MAGNESIUM: CPT

## 2018-04-17 PROCEDURE — 93010 ELECTROCARDIOGRAM REPORT: CPT | Mod: ,,, | Performed by: INTERNAL MEDICINE

## 2018-04-17 PROCEDURE — G8989 SELF CARE D/C STATUS: HCPCS | Mod: CL

## 2018-04-17 PROCEDURE — 63600175 PHARM REV CODE 636 W HCPCS: Performed by: NURSE PRACTITIONER

## 2018-04-17 PROCEDURE — 96368 THER/DIAG CONCURRENT INF: CPT

## 2018-04-17 PROCEDURE — G8978 MOBILITY CURRENT STATUS: HCPCS | Mod: CL

## 2018-04-17 RX ORDER — ALLOPURINOL 100 MG/1
200 TABLET ORAL DAILY
Status: DISCONTINUED | OUTPATIENT
Start: 2018-04-17 | End: 2018-04-20 | Stop reason: HOSPADM

## 2018-04-17 RX ORDER — EZETIMIBE 10 MG/1
10 TABLET ORAL DAILY
Status: DISCONTINUED | OUTPATIENT
Start: 2018-04-17 | End: 2018-04-20 | Stop reason: HOSPADM

## 2018-04-17 RX ORDER — HYDROCODONE BITARTRATE AND ACETAMINOPHEN 10; 325 MG/1; MG/1
1 TABLET ORAL EVERY 6 HOURS PRN
Status: DISCONTINUED | OUTPATIENT
Start: 2018-04-17 | End: 2018-04-20 | Stop reason: HOSPADM

## 2018-04-17 RX ORDER — FUROSEMIDE 10 MG/ML
80 INJECTION INTRAMUSCULAR; INTRAVENOUS 2 TIMES DAILY
Status: DISCONTINUED | OUTPATIENT
Start: 2018-04-17 | End: 2018-04-17

## 2018-04-17 RX ORDER — HYDRALAZINE HYDROCHLORIDE 20 MG/ML
10 INJECTION INTRAMUSCULAR; INTRAVENOUS EVERY 8 HOURS PRN
Status: DISCONTINUED | OUTPATIENT
Start: 2018-04-17 | End: 2018-04-20 | Stop reason: HOSPADM

## 2018-04-17 RX ORDER — FUROSEMIDE 10 MG/ML
40 INJECTION INTRAMUSCULAR; INTRAVENOUS ONCE
Status: DISCONTINUED | OUTPATIENT
Start: 2018-04-17 | End: 2018-04-20 | Stop reason: HOSPADM

## 2018-04-17 RX ORDER — CEFTRIAXONE 1 G/1
1 INJECTION, POWDER, FOR SOLUTION INTRAMUSCULAR; INTRAVENOUS
Status: DISCONTINUED | OUTPATIENT
Start: 2018-04-18 | End: 2018-04-18 | Stop reason: RX

## 2018-04-17 RX ORDER — HEPARIN SODIUM 5000 [USP'U]/ML
5000 INJECTION, SOLUTION INTRAVENOUS; SUBCUTANEOUS EVERY 8 HOURS
Status: DISCONTINUED | OUTPATIENT
Start: 2018-04-17 | End: 2018-04-20 | Stop reason: HOSPADM

## 2018-04-17 RX ORDER — MECLIZINE HYDROCHLORIDE 25 MG/1
25 TABLET ORAL NIGHTLY PRN
Status: DISCONTINUED | OUTPATIENT
Start: 2018-04-17 | End: 2018-04-20 | Stop reason: HOSPADM

## 2018-04-17 RX ORDER — ACETAMINOPHEN 650 MG/1
650 SUPPOSITORY RECTAL
Status: COMPLETED | OUTPATIENT
Start: 2018-04-17 | End: 2018-04-17

## 2018-04-17 RX ORDER — IPRATROPIUM BROMIDE AND ALBUTEROL SULFATE 2.5; .5 MG/3ML; MG/3ML
3 SOLUTION RESPIRATORY (INHALATION) EVERY 6 HOURS PRN
Status: DISCONTINUED | OUTPATIENT
Start: 2018-04-17 | End: 2018-04-20 | Stop reason: HOSPADM

## 2018-04-17 RX ORDER — ATENOLOL 50 MG/1
100 TABLET ORAL DAILY
Status: DISCONTINUED | OUTPATIENT
Start: 2018-04-17 | End: 2018-04-20 | Stop reason: HOSPADM

## 2018-04-17 RX ORDER — IBUPROFEN 200 MG
24 TABLET ORAL
Status: DISCONTINUED | OUTPATIENT
Start: 2018-04-17 | End: 2018-04-20 | Stop reason: HOSPADM

## 2018-04-17 RX ORDER — DILTIAZEM HYDROCHLORIDE 120 MG/1
240 CAPSULE, COATED, EXTENDED RELEASE ORAL DAILY
Status: DISCONTINUED | OUTPATIENT
Start: 2018-04-17 | End: 2018-04-20 | Stop reason: HOSPADM

## 2018-04-17 RX ORDER — SODIUM CHLORIDE 0.9 % (FLUSH) 0.9 %
5 SYRINGE (ML) INJECTION
Status: DISCONTINUED | OUTPATIENT
Start: 2018-04-17 | End: 2018-04-20 | Stop reason: HOSPADM

## 2018-04-17 RX ORDER — NAPROXEN SODIUM 220 MG/1
81 TABLET, FILM COATED ORAL DAILY
Status: DISCONTINUED | OUTPATIENT
Start: 2018-04-17 | End: 2018-04-20 | Stop reason: HOSPADM

## 2018-04-17 RX ORDER — HYDROCODONE BITARTRATE AND ACETAMINOPHEN 5; 325 MG/1; MG/1
1 TABLET ORAL EVERY 6 HOURS PRN
Status: DISCONTINUED | OUTPATIENT
Start: 2018-04-17 | End: 2018-04-20 | Stop reason: HOSPADM

## 2018-04-17 RX ORDER — FUROSEMIDE 10 MG/ML
80 INJECTION INTRAMUSCULAR; INTRAVENOUS 2 TIMES DAILY
Status: DISCONTINUED | OUTPATIENT
Start: 2018-04-17 | End: 2018-04-20 | Stop reason: HOSPADM

## 2018-04-17 RX ORDER — GLUCAGON 1 MG
1 KIT INJECTION
Status: DISCONTINUED | OUTPATIENT
Start: 2018-04-17 | End: 2018-04-20 | Stop reason: HOSPADM

## 2018-04-17 RX ORDER — SENNOSIDES 8.6 MG/1
1 TABLET ORAL DAILY
Status: DISCONTINUED | OUTPATIENT
Start: 2018-04-17 | End: 2018-04-20 | Stop reason: HOSPADM

## 2018-04-17 RX ORDER — ONDANSETRON 2 MG/ML
4 INJECTION INTRAMUSCULAR; INTRAVENOUS EVERY 6 HOURS PRN
Status: DISCONTINUED | OUTPATIENT
Start: 2018-04-17 | End: 2018-04-20 | Stop reason: HOSPADM

## 2018-04-17 RX ORDER — ACETAMINOPHEN 325 MG/1
650 TABLET ORAL EVERY 4 HOURS PRN
Status: DISCONTINUED | OUTPATIENT
Start: 2018-04-17 | End: 2018-04-20 | Stop reason: HOSPADM

## 2018-04-17 RX ORDER — FUROSEMIDE 10 MG/ML
40 INJECTION INTRAMUSCULAR; INTRAVENOUS ONCE
Status: COMPLETED | OUTPATIENT
Start: 2018-04-17 | End: 2018-04-17

## 2018-04-17 RX ORDER — IBUPROFEN 200 MG
16 TABLET ORAL
Status: DISCONTINUED | OUTPATIENT
Start: 2018-04-17 | End: 2018-04-20 | Stop reason: HOSPADM

## 2018-04-17 RX ORDER — FUROSEMIDE 10 MG/ML
40 INJECTION INTRAMUSCULAR; INTRAVENOUS
Status: COMPLETED | OUTPATIENT
Start: 2018-04-17 | End: 2018-04-17

## 2018-04-17 RX ADMIN — FUROSEMIDE 80 MG: 10 INJECTION, SOLUTION INTRAMUSCULAR; INTRAVENOUS at 05:04

## 2018-04-17 RX ADMIN — HEPARIN SODIUM 5000 UNITS: 5000 INJECTION, SOLUTION INTRAVENOUS; SUBCUTANEOUS at 02:04

## 2018-04-17 RX ADMIN — ATENOLOL 100 MG: 50 TABLET ORAL at 09:04

## 2018-04-17 RX ADMIN — ASPIRIN 81 MG 81 MG: 81 TABLET ORAL at 09:04

## 2018-04-17 RX ADMIN — HEPARIN SODIUM 5000 UNITS: 5000 INJECTION, SOLUTION INTRAVENOUS; SUBCUTANEOUS at 09:04

## 2018-04-17 RX ADMIN — SENNOSIDES 1 TABLET: 8.6 TABLET, FILM COATED ORAL at 09:04

## 2018-04-17 RX ADMIN — HEPARIN SODIUM 5000 UNITS: 5000 INJECTION, SOLUTION INTRAVENOUS; SUBCUTANEOUS at 06:04

## 2018-04-17 RX ADMIN — DILTIAZEM HYDROCHLORIDE 240 MG: 120 CAPSULE, COATED, EXTENDED RELEASE ORAL at 09:04

## 2018-04-17 RX ADMIN — EZETIMIBE 10 MG: 10 TABLET ORAL at 09:04

## 2018-04-17 RX ADMIN — FUROSEMIDE 40 MG: 10 INJECTION, SOLUTION INTRAMUSCULAR; INTRAVENOUS at 04:04

## 2018-04-17 RX ADMIN — CEFTRIAXONE SODIUM 2 G: 2 INJECTION, POWDER, FOR SOLUTION INTRAMUSCULAR; INTRAVENOUS at 04:04

## 2018-04-17 RX ADMIN — AZITHROMYCIN MONOHYDRATE 500 MG: 500 INJECTION, POWDER, LYOPHILIZED, FOR SOLUTION INTRAVENOUS at 06:04

## 2018-04-17 RX ADMIN — ALLOPURINOL 200 MG: 100 TABLET ORAL at 09:04

## 2018-04-17 RX ADMIN — ACETAMINOPHEN 650 MG: 650 SUPPOSITORY RECTAL at 04:04

## 2018-04-17 NOTE — CONSULTS
[facility]  Cardiology  Consult Note    Patient Name: Diana Turner  MRN: [id number]  Admission Date: 4/17/2018  Hospital Length of Stay: 0 days  Code Status: Full Code   Attending Provider: Tam Ferrer MD   Consulting Provider: Marium Shipley PA-C  Primary Care Physician: Cierra Woods DO  Principal Problem:Acute on chronic diastolic congestive heart failure    Patient information was obtained from past medical records and ER records.     Inpatient consult to Cardiology  Consult performed by: MARIUM SHIPLEY  Consult ordered by: BUDDY MAI        Subjective:     Chief Complaint:  SOB     HPI:   Ms. Turner is an 84 year old female patient with a PMHx of SSS s/p PPM, AI/AS, diastolic CHF, HTN, hyperlipidemia, and carotid artery disease who presented to [facility] ED yesterday with a chief complaint of progressively worsening SOB over the past week. Associated symptoms included leg swelling, orthopnea, and PND. Patient denied any associated chest pain, palpitations, near syncope, or syncope. EMS reported O2 sat of 84% upon arrival to home. Initial workup in ED revealed +fever (101), creatinine of 2.1, troponin of 0.375, +procalcitonin (1.58), lactic acidosis (2.4), and BNP of 3,526. CXR showed right lung field infiltrate as well as vascular congestion and patient subsequently admitted for further evaluation and treatment. Cardiology consulted to assist with management. Patient seen and examined today, sitting up in bed. HPI limited due to dementia. No real complaints. States she is feeling better. Admits she forgot her meds over the past week. Chart reviewed. Troponin elevated but flat 0.375>0.385. BC pending. 2D echo pending.     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.11)     Cardiac pacemaker 10/10/2013    Carotid artery  occlusion     Chronic renal insufficiency, stage III (moderate) 10/10/2013    Depression     History of hyperparathyroidism     Hyperlipidemia     Hypertension     Kidney cysts     right US renal artery 9/2013    Obesity     Orbital mass     Pneumonia     as a child, required hospitalization    Right bundle branch block     Sinoatrial node dysfunction     Thyroid disease     goiter    Urinary incontinence        Past Surgical History:   Procedure Laterality Date    ADENOIDECTOMY      APPENDECTOMY      CHOLECYSTECTOMY      HEMORRHOID SURGERY      HYSTERECTOMY      RAHUL; benign reasons    INSERT / REPLACE / REMOVE PACEMAKER  09/25/2008    MEDTRONIC    KNEE SURGERY      right    TONSILLECTOMY         Review of patient's allergies indicates:   Allergen Reactions    Pravastatin Other (See Comments)    Sulfa (sulfonamide antibiotics) Swelling and Other (See Comments)    Yeast, dried Other (See Comments)       No current facility-administered medications on file prior to encounter.      Current Outpatient Prescriptions on File Prior to Encounter   Medication Sig    acetaminophen (TYLENOL) 500 MG tablet Take 500 mg by mouth every 6 (six) hours as needed.    allopurinol (ZYLOPRIM) 100 MG tablet TAKE 2 TABLETS BY MOUTH ONCE DAILY.    aspirin 81 mg CpDR Take 1 tablet by mouth Daily.    atenolol (TENORMIN) 100 MG tablet TAKE 1 TABLET BY MOUTH EVERY DAY    calcitRIOL (ROCALTROL) 0.25 MCG Cap TAKE 1 CAPSULE (0.25 MCG TOTAL) BY MOUTH ONCE DAILY.    citalopram (CELEXA) 40 MG tablet Take 1 tablet (40 mg total) by mouth once daily.    diltiaZEM (CARDIZEM CD) 240 MG 24 hr capsule TAKE 1 CAPSULE BY MOUTH ONCE DAILY.    FLUTICASONE PROPIONATE (CLARISPRAY NASL) by Nasal route.    furosemide (LASIX) 40 MG tablet TAKE 1 TABLET (40 MG TOTAL) BY MOUTH ONCE DAILY.    meclizine (ANTIVERT) 25 mg tablet TAKE 1 TABLET (25 MG TOTAL) BY MOUTH EVERY EVENING.    omega-3 fatty acids-vitamin E (FISH OIL) 1,000 mg Cap  Take 1 capsule by mouth Twice daily.    senna (SENOKOT) 8.6 mg tablet Take 1 tablet by mouth once daily.    ZETIA 10 mg tablet TAKE 1 TABLET BY MOUTH EVERY DAY     Family History     Problem Relation (Age of Onset)    Diabetes Cousin    Heart disease Brother    Hyperlipidemia Brother    Hypertension Mother, Brother    Kidney disease Mother    Stroke Maternal Grandmother        Social History Main Topics    Smoking status: Never Smoker    Smokeless tobacco: Never Used    Alcohol use No    Drug use: No    Sexual activity: No     Review of Systems   Unable to perform ROS: dementia     Objective:     Vital Signs (Most Recent):  Temp: 98 °F (36.7 °C) (04/17/18 0715)  Pulse: 60 (04/17/18 0745)  Resp: (!) 25 (04/17/18 0745)  BP: (!) 114/36 (04/17/18 0745)  SpO2: 100 % (04/17/18 0745) Vital Signs (24h Range):  Temp:  [98 °F (36.7 °C)-101 °F (38.3 °C)] 98 °F (36.7 °C)  Pulse:  [60-91] 60  Resp:  [22-49] 25  SpO2:  [95 %-100 %] 100 %  BP: (104-157)/(31-76) 114/36     Weight: 90 kg (198 lb 6.6 oz)  Body mass index is 34.06 kg/m².    SpO2: 100 %  O2 Device (Oxygen Therapy): BiPAP      Intake/Output Summary (Last 24 hours) at 04/17/18 1247  Last data filed at 04/17/18 0700   Gross per 24 hour   Intake              300 ml   Output              425 ml   Net             -125 ml       Lines/Drains/Airways     Drain                 Urethral Catheter 04/17/18 0352 Latex 16 Fr. less than 1 day          Peripheral Intravenous Line                 Peripheral IV - Single Lumen 04/17/18 0323 Right Antecubital less than 1 day                Physical Exam   Constitutional: She appears well-developed and well-nourished. No distress.   HENT:   Head: Normocephalic and atraumatic.   Eyes: Pupils are equal, round, and reactive to light. Right eye exhibits no discharge. Left eye exhibits no discharge.   Neck: Neck supple. JVD present.   Cardiovascular: Normal rate, regular rhythm, S1 normal and S2 normal.    Murmur heard.   Harsh  midsystolic murmur is present  at the upper right sternal border radiating to the neck  Pulmonary/Chest: Effort normal. She has rales (Bibasilar).   PPM site well-healed   Abdominal: Soft. Bowel sounds are normal. She exhibits no distension. There is no tenderness. There is no rebound.   Neurological: She is alert.   Pleasantly confused at times   Skin: Skin is warm and dry. She is not diaphoretic. There is erythema (slight redness BLE).   Psychiatric: She has a normal mood and affect. Her behavior is normal. Thought content normal.   Nursing note and vitals reviewed.      Significant Labs:   ABG:   Recent Labs  Lab 04/17/18  0417   PH 7.446   PCO2 30.9*   HCO3 21.3*   POCSATURATED 95   BE -3   , Blood Culture: No results for input(s): LABBLOO in the last 48 hours., CMP   Recent Labs  Lab 04/17/18  0320 04/17/18  0903   * 134*   K 4.4 3.9   CL 97 99   CO2 20* 23   * 146*   BUN 48* 51*   CREATININE 2.1* 2.0*   CALCIUM 9.2 8.4*   PROT 7.4  --    ALBUMIN 3.3* 2.6*   BILITOT 1.5*  --    ALKPHOS 93  --    AST 37  --    ALT 24  --    ANIONGAP 15 12   ESTGFRAFRICA 24* 26*   EGFRNONAA 21* 22*   , CBC   Recent Labs  Lab 04/17/18  0320 04/17/18  0903   WBC 9.77 8.47   HGB 11.2* 9.2*   HCT 32.6* 27.1*    171   , Troponin   Recent Labs  Lab 04/17/18  0320 04/17/18  1138   TROPONINI 0.375* 0.385*    and All pertinent lab results from the last 24 hours have been reviewed.    Significant Imaging: Echocardiogram:   2D echo with color flow doppler:   Results for orders placed or performed during the hospital encounter of 09/10/16   2D echo with color flow doppler   Result Value Ref Range    EF 55 55 - 65    Mitral Valve Regurgitation TRIVIAL     Diastolic Dysfunction Yes (A)     Aortic Valve Regurgitation MODERATE (A)     Aortic Valve Stenosis MODERATE TO SEVERE (A)     Est. PA Systolic Pressure 39.48     Mitral Valve Mobility NORMAL     Tricuspid Valve Regurgitation TRIVIAL     and X-Ray: CXR: X-Ray Chest 1 View  (CXR): No results found for this visit on 04/17/18. and X-Ray Chest PA and Lateral (CXR): No results found for this visit on 04/17/18.    Assessment and Plan:   Patient who presents with decompensated diastolic CHF in setting of pneumonia and medication non-compliance. Re-start home regimen, diurese with IV Lasix. 2D echo pending, will reassess AS to make sure it has not progressed, contributing to volume overload.   * Acute on chronic diastolic congestive heart failure    -Decompensated, secondary to pneumonia and medication non-compliance  -Continue IV diuresis  -Strict I's/O's  -Continue ASA, BB  -No ACEI/ARB given renal function        CAP (community acquired pneumonia)    -Mgmt as per primary team; on abx        Elevated troponin    -Troponin 0.375>0.385  -Elevated but flat, secondary to demand ischemia from pneumonia, volume overload, and underlying renal failure  -Continue to trend  -Continue home medications-ASA, BB, Zetia  -2D echo pending-will re-evaluate underlying AS/AI to make sure valvular conditions have not worsened, contributing to symptomatology         Benign hypertensive heart disease with heart failure    -Continue ASA, atenolol, Zetia, Cardizem            VTE Risk Mitigation         Ordered     heparin (porcine) injection 5,000 Units  Every 8 hours     Route:  Subcutaneous        04/17/18 0447     IP VTE HIGH RISK PATIENT  Once      04/17/18 0447     Place sequential compression device  Until discontinued      04/17/18 0442          Thank you for your consult. I will follow-up with patient. Please contact us if you have any additional questions.    Marium Mcclellan PA-C  Cardiology   [facility]    Chart reviewed. Dr. Browne examined patient and agrees with plan as outlined above.

## 2018-04-17 NOTE — ASSESSMENT & PLAN NOTE
-Decompensated, secondary to pneumonia and medication non-compliance  -Continue IV diuresis  -Strict I's/O's  -Continue ASA, BB  -No ACEI/ARB given renal function

## 2018-04-17 NOTE — H&P
[facility] Hospital Medicine  History & Physical    Patient Name: Diana Turner  MRN: [id number]  Admission Date: 4/17/2018  Attending Physician: Con Zheng MD  Primary Care Provider: Cierra Woods DO         Patient information was obtained from patient and ER records.     Subjective:     Principal Problem:Acute on chronic diastolic congestive heart failure    Chief Complaint:   Chief Complaint   Patient presents with    Shortness of Breath        HPI: 84F h/o HFpEF, HTN, SSS s/p PPM, presents with SOB x 1 week.  Was transported by EMS to ER .   Per EMS, patient SpO2 on scene was 84% with RR 45.  Patient was placed on CPAP on scene.  Associated with ZELAYA, PND and orthopnea.  No other exacerbating or alleviating factors.  Patient had not been compliant on  her medications for the last week.  She saw cardiology (veronica) yesterday who advised patient to be compliant on medications, especially lasix 40mg PO BID.   Patient was placed on Bipap FiO2 100% with sat 97% in Er.  Tmax 101F.   CXR show patchy bilateral infiltrates, cardiomegaly.  BNP>3000 trop 0.345.   Lasix 40mg IV x 1 given in Er.    Blood cx x 2 and Urine cx collected.   Given CTX 2g x 1. Hospital medicine called for admission.         Past Medical History:   Diagnosis Date    Anemia     Aortic valve disorders     Arthritis     Benign hypertensive heart disease with heart failure(402.11)     Cardiac pacemaker 10/10/2013    Carotid artery occlusion     Chronic renal insufficiency, stage III (moderate) 10/10/2013    Depression     History of hyperparathyroidism     Hyperlipidemia     Hypertension     Kidney cysts     right US renal artery 9/2013    Obesity     Orbital mass     Pneumonia     as a child, required hospitalization    Right bundle branch block     Sinoatrial node dysfunction     Thyroid disease     goiter    Urinary incontinence        Past Surgical History:   Procedure Laterality Date    ADENOIDECTOMY       APPENDECTOMY      CHOLECYSTECTOMY      HEMORRHOID SURGERY      HYSTERECTOMY      RAHUL; benign reasons    INSERT / REPLACE / REMOVE PACEMAKER  09/25/2008    [employer]    KNEE SURGERY      right    TONSILLECTOMY         Review of patient's allergies indicates:   Allergen Reactions    Pravastatin Other (See Comments)    Sulfa (sulfonamide antibiotics) Swelling and Other (See Comments)    Yeast, dried Other (See Comments)       Current Facility-Administered Medications on File Prior to Encounter   Medication    diphenhydrAMINE capsule 25 mg    ferumoxytol (FERAHEME) 510 mg in dextrose 5 % 100 mL IVPB    heparin, porcine (PF) 100 unit/mL injection flush 500 Units    sodium chloride 0.9% flush 10 mL     Current Outpatient Prescriptions on File Prior to Encounter   Medication Sig    acetaminophen (TYLENOL) 500 MG tablet Take 500 mg by mouth every 6 (six) hours as needed.    allopurinol (ZYLOPRIM) 100 MG tablet TAKE 2 TABLETS BY MOUTH ONCE DAILY.    aspirin 81 mg CpDR Take 1 tablet by mouth Daily.    atenolol (TENORMIN) 100 MG tablet TAKE 1 TABLET BY MOUTH EVERY DAY    calcitRIOL (ROCALTROL) 0.25 MCG Cap TAKE 1 CAPSULE (0.25 MCG TOTAL) BY MOUTH ONCE DAILY.    citalopram (CELEXA) 40 MG tablet Take 1 tablet (40 mg total) by mouth once daily.    diltiaZEM (CARDIZEM CD) 240 MG 24 hr capsule TAKE 1 CAPSULE BY MOUTH ONCE DAILY.    FLUTICASONE PROPIONATE (CLARISPRAY NASL) by Nasal route.    furosemide (LASIX) 40 MG tablet TAKE 1 TABLET (40 MG TOTAL) BY MOUTH ONCE DAILY.    meclizine (ANTIVERT) 25 mg tablet TAKE 1 TABLET (25 MG TOTAL) BY MOUTH EVERY EVENING.    omega-3 fatty acids-vitamin E (FISH OIL) 1,000 mg Cap Take 1 capsule by mouth Twice daily.    senna (SENOKOT) 8.6 mg tablet Take 1 tablet by mouth once daily.    ZETIA 10 mg tablet TAKE 1 TABLET BY MOUTH EVERY DAY     Family History     Problem Relation (Age of Onset)    Diabetes Cousin    Heart disease Brother    Hyperlipidemia Brother     Hypertension Mother, Brother    Kidney disease Mother    Stroke Maternal Grandmother        Social History Main Topics    Smoking status: Never Smoker    Smokeless tobacco: Never Used    Alcohol use No    Drug use: No    Sexual activity: No     Review of Systems   Unable to perform ROS: Severe respiratory distress     Objective:     Vital Signs (Most Recent):  Temp: (!) 101 °F (38.3 °C) (04/17/18 0420)  Pulse: 68 (04/17/18 0506)  Resp: (!) 32 (04/17/18 0500)  BP: 129/61 (04/17/18 0500)  SpO2: 100 % (04/17/18 0500) Vital Signs (24h Range):  Temp:  [101 °F (38.3 °C)] 101 °F (38.3 °C)  Pulse:  [60-91] 68  Resp:  [32-49] 32  SpO2:  [95 %-100 %] 100 %  BP: (118-157)/(61-76) 129/61     Weight: 94.4 kg (208 lb 1.6 oz)  Body mass index is 35.72 kg/m².    Physical Exam   Constitutional: She is oriented to person, place, and time. She appears well-developed and well-nourished. No distress.   HENT:   Head: Normocephalic and atraumatic.   Eyes: Conjunctivae and EOM are normal. Pupils are equal, round, and reactive to light. No scleral icterus.   Neck: Normal range of motion. Neck supple. No JVD present. No thyromegaly present.   Cardiovascular: Normal rate, regular rhythm and intact distal pulses.  Exam reveals no gallop and no friction rub.    No murmur heard.  Pulmonary/Chest: She is in respiratory distress. She has no wheezes. She has rales (coarse rales bilaterally). She exhibits no tenderness.   Increased WOB, moderate resp distress   Abdominal: Soft. Bowel sounds are normal. She exhibits no distension and no mass. There is no tenderness. There is no guarding.   Musculoskeletal: Normal range of motion. She exhibits edema (3+ BL pitting edema). She exhibits no tenderness.   Neurological: She is alert and oriented to person, place, and time. No cranial nerve deficit.   Skin: Skin is warm and dry. Capillary refill takes 2 to 3 seconds. No rash noted. She is not diaphoretic. No erythema.   Psychiatric: She has a normal  mood and affect.   Nursing note and vitals reviewed.        CRANIAL NERVES     CN III, IV, VI   Pupils are equal, round, and reactive to light.  Extraocular motions are normal.        Significant Labs:   A1C: No results for input(s): HGBA1C in the last 4320 hours.  BMP:   Recent Labs  Lab 04/17/18 0320   *   *   K 4.4   CL 97   CO2 20*   BUN 48*   CREATININE 2.1*   CALCIUM 9.2   MG 1.6     CBC:   Recent Labs  Lab 04/17/18 0320   WBC 9.77   HGB 11.2*   HCT 32.6*        Troponin:   Recent Labs  Lab 04/17/18 0320   TROPONINI 0.375*     All pertinent labs within the past 24 hours have been reviewed.    Significant Imaging: I have reviewed and interpreted all pertinent imaging results/findings within the past 24 hours.   Imaging Results          X-Ray Chest AP Portable (In process)                   Assessment/Plan:     * Acute on chronic diastolic congestive heart failure    - only given 1 dose IV 40mg lasix in ER  - BNP>3000, with significant SOB  - exacerbation 2/2 noncompliance with last visit in cardiology clinic yesterday (veronica)    - Give lasix 80mg IV BID, first dose now  - strict I/O, daily weights  - Fluid restriction 1L            SA node dysfunction    S/p PPM  HR controlled  Continue atenolol, diltiazem          CAP (community acquired pneumonia)    - R side patchy infiltrate on CXR    - Continue CTX, Add azithromycin  - follow up cx          Acute on chronic renal failure    - Prerenal 2/2 chf exacerbation  - monitor I/O          Elevated troponin    - 2/2 work/demand ischemia from CHF exacerbation  - EKG showing AV paced rhythm, ST abnormalities appears to have J point elevation in precordial leads    - Repeat EKG  - Consult cardiology  - trend troponin x 6            Benign hypertensive heart disease with heart failure    - controlled  - continue atenolol, diltiazem            VTE Risk Mitigation         Ordered     heparin (porcine) injection 5,000 Units  Every 8 hours     Route:   Subcutaneous        04/17/18 0447     IP VTE HIGH RISK PATIENT  Once      04/17/18 0447     Place sequential compression device  Until discontinued      04/17/18 0442        Critical care time spent on the evaluation and treatment of severe organ dysfunction, review of pertinent labs and imaging studies, discussions with consulting providers and discussions with patient/family: 40 minutes.     Con Zheng MD  Department of Hospital Medicine   [facility]

## 2018-04-17 NOTE — HOSPITAL COURSE
Upon admission she was found to be hypoxemic and her O2 sats were in the 80%.  She was found in the pulmonary edema, high temperature of 101, she was placed on BiPAP overnight .  4/18 looks better.  On O2 via nasal cannula.  No distress.  4/19 comfortable in bed no distress.  Afebrile.-2.8 L since admission.  On O2 via nasal cannula.  4/20 comfortable in chair . No distress. On O2 via nasal cannula -4 liters since admission.

## 2018-04-17 NOTE — SUBJECTIVE & OBJECTIVE
Past Medical History:   Diagnosis Date    Anemia     Aortic valve disorders     Arthritis     Benign hypertensive heart disease with heart failure(402.11)     Cardiac pacemaker 10/10/2013    Carotid artery occlusion     Chronic renal insufficiency, stage III (moderate) 10/10/2013    Depression     History of hyperparathyroidism     Hyperlipidemia     Hypertension     Kidney cysts     right US renal artery 9/2013    Obesity     Orbital mass     Pneumonia     as a child, required hospitalization    Right bundle branch block     Sinoatrial node dysfunction     Thyroid disease     goiter    Urinary incontinence        Past Surgical History:   Procedure Laterality Date    ADENOIDECTOMY      APPENDECTOMY      CHOLECYSTECTOMY      HEMORRHOID SURGERY      HYSTERECTOMY      RAHUL; benign reasons    INSERT / REPLACE / REMOVE PACEMAKER  09/25/2008    [employer]    KNEE SURGERY      right    TONSILLECTOMY         Review of patient's allergies indicates:   Allergen Reactions    Pravastatin Other (See Comments)    Sulfa (sulfonamide antibiotics) Swelling and Other (See Comments)    Yeast, dried Other (See Comments)       Current Facility-Administered Medications on File Prior to Encounter   Medication    diphenhydrAMINE capsule 25 mg    ferumoxytol (FERAHEME) 510 mg in dextrose 5 % 100 mL IVPB    heparin, porcine (PF) 100 unit/mL injection flush 500 Units    sodium chloride 0.9% flush 10 mL     Current Outpatient Prescriptions on File Prior to Encounter   Medication Sig    acetaminophen (TYLENOL) 500 MG tablet Take 500 mg by mouth every 6 (six) hours as needed.    allopurinol (ZYLOPRIM) 100 MG tablet TAKE 2 TABLETS BY MOUTH ONCE DAILY.    aspirin 81 mg CpDR Take 1 tablet by mouth Daily.    atenolol (TENORMIN) 100 MG tablet TAKE 1 TABLET BY MOUTH EVERY DAY    calcitRIOL (ROCALTROL) 0.25 MCG Cap TAKE 1 CAPSULE (0.25 MCG TOTAL) BY MOUTH ONCE DAILY.    citalopram (CELEXA) 40 MG tablet Take 1  tablet (40 mg total) by mouth once daily.    diltiaZEM (CARDIZEM CD) 240 MG 24 hr capsule TAKE 1 CAPSULE BY MOUTH ONCE DAILY.    FLUTICASONE PROPIONATE (CLARISPRAY NASL) by Nasal route.    furosemide (LASIX) 40 MG tablet TAKE 1 TABLET (40 MG TOTAL) BY MOUTH ONCE DAILY.    meclizine (ANTIVERT) 25 mg tablet TAKE 1 TABLET (25 MG TOTAL) BY MOUTH EVERY EVENING.    omega-3 fatty acids-vitamin E (FISH OIL) 1,000 mg Cap Take 1 capsule by mouth Twice daily.    senna (SENOKOT) 8.6 mg tablet Take 1 tablet by mouth once daily.    ZETIA 10 mg tablet TAKE 1 TABLET BY MOUTH EVERY DAY     Family History     Problem Relation (Age of Onset)    Diabetes Cousin    Heart disease Brother    Hyperlipidemia Brother    Hypertension Mother, Brother    Kidney disease Mother    Stroke Maternal Grandmother        Social History Main Topics    Smoking status: Never Smoker    Smokeless tobacco: Never Used    Alcohol use No    Drug use: No    Sexual activity: No     Review of Systems   Unable to perform ROS: Severe respiratory distress     Objective:     Vital Signs (Most Recent):  Temp: (!) 101 °F (38.3 °C) (04/17/18 0420)  Pulse: 68 (04/17/18 0506)  Resp: (!) 32 (04/17/18 0500)  BP: 129/61 (04/17/18 0500)  SpO2: 100 % (04/17/18 0500) Vital Signs (24h Range):  Temp:  [101 °F (38.3 °C)] 101 °F (38.3 °C)  Pulse:  [60-91] 68  Resp:  [32-49] 32  SpO2:  [95 %-100 %] 100 %  BP: (118-157)/(61-76) 129/61     Weight: 94.4 kg (208 lb 1.6 oz)  Body mass index is 35.72 kg/m².    Physical Exam   Constitutional: She is oriented to person, place, and time. She appears well-developed and well-nourished. No distress.   HENT:   Head: Normocephalic and atraumatic.   Eyes: Conjunctivae and EOM are normal. Pupils are equal, round, and reactive to light. No scleral icterus.   Neck: Normal range of motion. Neck supple. No JVD present. No thyromegaly present.   Cardiovascular: Normal rate, regular rhythm and intact distal pulses.  Exam reveals no gallop and  no friction rub.    No murmur heard.  Pulmonary/Chest: She is in respiratory distress. She has no wheezes. She has rales (coarse rales bilaterally). She exhibits no tenderness.   Increased WOB, moderate resp distress   Abdominal: Soft. Bowel sounds are normal. She exhibits no distension and no mass. There is no tenderness. There is no guarding.   Musculoskeletal: Normal range of motion. She exhibits edema (3+ BL pitting edema). She exhibits no tenderness.   Neurological: She is alert and oriented to person, place, and time. No cranial nerve deficit.   Skin: Skin is warm and dry. Capillary refill takes 2 to 3 seconds. No rash noted. She is not diaphoretic. No erythema.   Psychiatric: She has a normal mood and affect.   Nursing note and vitals reviewed.        CRANIAL NERVES     CN III, IV, VI   Pupils are equal, round, and reactive to light.  Extraocular motions are normal.        Significant Labs:   A1C: No results for input(s): HGBA1C in the last 4320 hours.  BMP:   Recent Labs  Lab 04/17/18  0320   *   *   K 4.4   CL 97   CO2 20*   BUN 48*   CREATININE 2.1*   CALCIUM 9.2   MG 1.6     CBC:   Recent Labs  Lab 04/17/18 0320   WBC 9.77   HGB 11.2*   HCT 32.6*        Troponin:   Recent Labs  Lab 04/17/18 0320   TROPONINI 0.375*     All pertinent labs within the past 24 hours have been reviewed.    Significant Imaging: I have reviewed and interpreted all pertinent imaging results/findings within the past 24 hours.   Imaging Results          X-Ray Chest AP Portable (In process)

## 2018-04-17 NOTE — SUBJECTIVE & OBJECTIVE
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.11)     Cardiac pacemaker 10/10/2013    Carotid artery occlusion     Chronic renal insufficiency, stage III (moderate) 10/10/2013    Depression     History of hyperparathyroidism     Hyperlipidemia     Hypertension     Kidney cysts     right US renal artery 9/2013    Obesity     Orbital mass     Pneumonia     as a child, required hospitalization    Right bundle branch block     Sinoatrial node dysfunction     Thyroid disease     goiter    Urinary incontinence        Past Surgical History:   Procedure Laterality Date    ADENOIDECTOMY      APPENDECTOMY      CHOLECYSTECTOMY      HEMORRHOID SURGERY      HYSTERECTOMY      RAHUL; benign reasons    INSERT / REPLACE / REMOVE PACEMAKER  09/25/2008    [employer]    KNEE SURGERY      right    TONSILLECTOMY         Review of patient's allergies indicates:   Allergen Reactions    Pravastatin Other (See Comments)    Sulfa (sulfonamide antibiotics) Swelling and Other (See Comments)    Yeast, dried Other (See Comments)       Family History     Problem Relation (Age of Onset)    Diabetes Cousin    Heart disease Brother    Hyperlipidemia Brother    Hypertension Mother, Brother    Kidney disease Mother    Stroke Maternal Grandmother        Social History Main Topics    Smoking status: Never Smoker    Smokeless tobacco: Never Used    Alcohol use No    Drug use: No    Sexual activity: No         Review of Systems   Unable to perform ROS: Dementia     Objective:     Vital Signs (Most Recent):  Temp: 98 °F (36.7 °C) (04/17/18 0715)  Pulse: 60 (04/17/18 0745)  Resp: (!) 25 (04/17/18 0745)  BP: (!) 114/36 (04/17/18 0745)  SpO2: 100 % (04/17/18 0745) Vital Signs (24h Range):  Temp:  [98 °F (36.7 °C)-101 °F (38.3 °C)] 98 °F (36.7 °C)  Pulse:  [60-91] 60  Resp:  [22-49]  25  SpO2:  [96 %-100 %] 100 %  BP: (104-157)/(31-76) 114/36     Weight: 90 kg (198 lb 6.6 oz)  Body mass index is 34.06 kg/m².      Intake/Output Summary (Last 24 hours) at 04/17/18 1347  Last data filed at 04/17/18 0700   Gross per 24 hour   Intake              300 ml   Output              425 ml   Net             -125 ml       Physical Exam   Constitutional: She appears well-developed and well-nourished.   HENT:   Head: Atraumatic.   Eyes: EOM are normal.   Neck: Neck supple.   Cardiovascular: Normal rate.    Pulmonary/Chest: Effort normal. She has rales.   Abdominal: Soft. There is no tenderness.   Genitourinary:   Genitourinary Comments: Jama in place   Musculoskeletal: She exhibits edema.   Bilateral pitting lower extremity edema   Neurological: She is alert.   Oriented to place only   Skin: Skin is warm.   Upper extremity bruises   Psychiatric:   Not agitated       Vents:       Lines/Drains/Airways     Drain                 Urethral Catheter 04/17/18 0352 Latex 16 Fr. less than 1 day          Peripheral Intravenous Line                 Peripheral IV - Single Lumen 04/17/18 0323 Right Antecubital less than 1 day                Significant Labs:    CBC/Anemia Profile:    Recent Labs  Lab 04/17/18  0320 04/17/18  0903   WBC 9.77 8.47   HGB 11.2* 9.2*   HCT 32.6* 27.1*    171   * 106*   RDW 14.8* 14.8*        Chemistries:    Recent Labs  Lab 04/17/18  0320 04/17/18  0903   * 134*   K 4.4 3.9   CL 97 99   CO2 20* 23   BUN 48* 51*   CREATININE 2.1* 2.0*   CALCIUM 9.2 8.4*   ALBUMIN 3.3* 2.6*   PROT 7.4  --    BILITOT 1.5*  --    ALKPHOS 93  --    ALT 24  --    AST 37  --    MG 1.6 1.7   PHOS 4.2 4.9*  4.9*       Significant Imaging:      Chest x-ray personally reviewed from 4/17/2018 showed multifocal pneumonia and cardiomegaly.    Echo 2016:    1 - Biatrial enlargement.     2 - Normal left ventricular systolic function (EF 55-60%).     3 - Left ventricular diastolic dysfunction.     4 - Normal  right ventricular systolic function .     5 - The estimated PA systolic pressure is 39 mmHg.     6 - Moderate to severe aortic stenosis.     7 - Moderate aortic regurgitation.     8 - Trivial mitral regurgitation.     9 - Trivial tricuspid regurgitation

## 2018-04-17 NOTE — ASSESSMENT & PLAN NOTE
Continue IV Rocephin and azithromycin.  Follow on blood culture and urine culture.  Repeat chest x-ray in a.m.  4/18 cultures negative.  Continue Rocephin and azithromycin.  Patient afebrile today.

## 2018-04-17 NOTE — PROGRESS NOTES
Care assumed from Peggy romo. Chart reviewed. Skin assessment done. VSS. Bipap in place and settings confirmed. Pt opens eyes to stimuli. Safety maintained.

## 2018-04-17 NOTE — PLAN OF CARE
Problem: Patient Care Overview  Goal: Plan of Care Review  Outcome: Ongoing (interventions implemented as appropriate)  Recommendations     Recommendation/Intervention: 1. Continue current diet. 2. If PO intake < 50 %, add boost plus TID. 3. Assistance with meals as needed. 4. Will continue to monitor.   Goals: Meet > 85 % EEN/EPN while admitted  Nutrition Goal Status: new  Communication of RD Recs:  (POC, sticky note)

## 2018-04-17 NOTE — HPI
84F h/o HFpEF, HTN, SSS s/p PPM, presents with SOB x 1 week.  Was transported by EMS to ER .   Per EMS, patient SpO2 on scene was 84% with RR 45.  Patient was placed on CPAP on scene.  Associated with ZELAYA, PND and orthopnea.  No other exacerbating or alleviating factors.  Patient had not been compliant on  her medications for the last week.  She saw cardiology (veronica) yesterday who advised patient to be compliant on medications, especially lasix 40mg PO BID.   Patient was placed on Bipap FiO2 100% with sat 97% in Er.  Tmax 101F.   CXR show patchy bilateral infiltrates, cardiomegaly.  BNP>3000 trop 0.345.   Lasix 40mg IV x 1 given in Er.    Blood cx x 2 and Urine cx collected.   Given CTX 2g x 1. Hospital medicine called for admission.

## 2018-04-17 NOTE — ASSESSMENT & PLAN NOTE
-Troponin 0.375>0.385  -Elevated but flat, secondary to demand ischemia from pneumonia, volume overload, and underlying renal failure  -Continue to trend  -Continue home medications-ASA, BB, Zetia  -2D echo pending

## 2018-04-17 NOTE — PLAN OF CARE
NICA met with the patient's step dtr. She stated her father has been  for 20 yr. She has kept up with her step mother since then thru the holidays birthdays, etc. 3 yr ago she became more involved in her care due to the fact she found her asking for more assistance especially with healthcare needs. She is a retired RN. She realizes her needs are greater than what she can provide - 24 hr care.  CM discussed d/c plans and wanted her to be placed. CM discussed POA/ Living Will.  She stated she does have the paperwork for it . She agrees with skilled care placement and then if she cannot return home , she may need long term placement. [facility] [place] [place] [facility] CM place referral in [facility]. CM to f/u with safe [facility]

## 2018-04-17 NOTE — ED NOTES
Called hospital medicine to clarify order for lasix. Spoke with DILIP Marroquin. Verbal orser to cancel lasix 80mg IV and give another 40mg of IV lasix.

## 2018-04-17 NOTE — HPI
Ms. Turner is an 84 year old female patient with a PMHx of SSS s/p PPM, AI/AS, diastolic CHF, HTN, hyperlipidemia, and carotid artery disease who presented to [facility] ED yesterday with a chief complaint of progressively worsening SOB over the past week. Associated symptoms included leg swelling, orthopnea, and PND. Patient denied any associated chest pain, palpitations, near syncope, or syncope. EMS reported O2 sat of 84% upon arrival to home. Initial workup in ED revealed +fever (101), creatinine of 2.1, troponin of 0.375, +procalcitonin (1.58), lactic acidosis (2.4), and BNP of 3,526. CXR showed right lung field infiltrate as well as vascular congestion and patient subsequently admitted for further evaluation and treatment. Cardiology consulted to assist with management. Patient seen and examined today, sitting up in bed. HPI limited due to dementia. No real complaints. States she is feeling better. Admits she forgot her meds over the past week. Chart reviewed. Troponin elevated but flat 0.375>0.385. BC pending. 2D echo pending.

## 2018-04-17 NOTE — HPI
84-year-old female patient brought to the hospital from home for respiratory distress.  The day of presentation she became more shortness of breath however her symptoms have been worsening for a few days.  Patient has dementia and usually ambulates with a walker at home.

## 2018-04-17 NOTE — SUBJECTIVE & OBJECTIVE
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.11)     Cardiac pacemaker 10/10/2013    Carotid artery occlusion     Chronic renal insufficiency, stage III (moderate) 10/10/2013    Depression     History of hyperparathyroidism     Hyperlipidemia     Hypertension     Kidney cysts     right US renal artery 9/2013    Obesity     Orbital mass     Pneumonia     as a child, required hospitalization    Right bundle branch block     Sinoatrial node dysfunction     Thyroid disease     goiter    Urinary incontinence        Past Surgical History:   Procedure Laterality Date    ADENOIDECTOMY      APPENDECTOMY      CHOLECYSTECTOMY      HEMORRHOID SURGERY      HYSTERECTOMY      RAHUL; benign reasons    INSERT / REPLACE / REMOVE PACEMAKER  09/25/2008    [facility]    KNEE SURGERY      right    TONSILLECTOMY         Review of patient's allergies indicates:   Allergen Reactions    Pravastatin Other (See Comments)    Sulfa (sulfonamide antibiotics) Swelling and Other (See Comments)    Yeast, dried Other (See Comments)       No current facility-administered medications on file prior to encounter.      Current Outpatient Prescriptions on File Prior to Encounter   Medication Sig    acetaminophen (TYLENOL) 500 MG tablet Take 500 mg by mouth every 6 (six) hours as needed.    allopurinol (ZYLOPRIM) 100 MG tablet TAKE 2 TABLETS BY MOUTH ONCE DAILY.    aspirin 81 mg CpDR Take 1 tablet by mouth Daily.    atenolol (TENORMIN) 100 MG tablet TAKE 1 TABLET BY MOUTH EVERY DAY    calcitRIOL (ROCALTROL) 0.25 MCG Cap TAKE 1 CAPSULE (0.25 MCG TOTAL) BY MOUTH ONCE DAILY.    citalopram (CELEXA) 40 MG tablet Take 1 tablet (40 mg total) by mouth once daily.    diltiaZEM (CARDIZEM CD) 240 MG 24 hr capsule TAKE 1 CAPSULE BY MOUTH ONCE DAILY.    FLUTICASONE PROPIONATE (CLARISPRAY  NASL) by Nasal route.    furosemide (LASIX) 40 MG tablet TAKE 1 TABLET (40 MG TOTAL) BY MOUTH ONCE DAILY.    meclizine (ANTIVERT) 25 mg tablet TAKE 1 TABLET (25 MG TOTAL) BY MOUTH EVERY EVENING.    omega-3 fatty acids-vitamin E (FISH OIL) 1,000 mg Cap Take 1 capsule by mouth Twice daily.    senna (SENOKOT) 8.6 mg tablet Take 1 tablet by mouth once daily.    ZETIA 10 mg tablet TAKE 1 TABLET BY MOUTH EVERY DAY     Family History     Problem Relation (Age of Onset)    Diabetes Cousin    Heart disease Brother    Hyperlipidemia Brother    Hypertension Mother, Brother    Kidney disease Mother    Stroke Maternal Grandmother        Social History Main Topics    Smoking status: Never Smoker    Smokeless tobacco: Never Used    Alcohol use No    Drug use: No    Sexual activity: No     Review of Systems   Unable to perform ROS: dementia     Objective:     Vital Signs (Most Recent):  Temp: 98 °F (36.7 °C) (04/17/18 0715)  Pulse: 60 (04/17/18 0745)  Resp: (!) 25 (04/17/18 0745)  BP: (!) 114/36 (04/17/18 0745)  SpO2: 100 % (04/17/18 0745) Vital Signs (24h Range):  Temp:  [98 °F (36.7 °C)-101 °F (38.3 °C)] 98 °F (36.7 °C)  Pulse:  [60-91] 60  Resp:  [22-49] 25  SpO2:  [95 %-100 %] 100 %  BP: (104-157)/(31-76) 114/36     Weight: 90 kg (198 lb 6.6 oz)  Body mass index is 34.06 kg/m².    SpO2: 100 %  O2 Device (Oxygen Therapy): BiPAP      Intake/Output Summary (Last 24 hours) at 04/17/18 1247  Last data filed at 04/17/18 0700   Gross per 24 hour   Intake              300 ml   Output              425 ml   Net             -125 ml       Lines/Drains/Airways     Drain                 Urethral Catheter 04/17/18 0352 Latex 16 Fr. less than 1 day          Peripheral Intravenous Line                 Peripheral IV - Single Lumen 04/17/18 0323 Right Antecubital less than 1 day                Physical Exam   Constitutional: She appears well-developed and well-nourished. No distress.   HENT:   Head: Normocephalic and atraumatic.   Eyes:  Pupils are equal, round, and reactive to light. Right eye exhibits no discharge. Left eye exhibits no discharge.   Neck: Neck supple. JVD present.   Cardiovascular: Normal rate, regular rhythm, S1 normal and S2 normal.    Murmur heard.   Harsh midsystolic murmur is present  at the upper right sternal border radiating to the neck  Pulmonary/Chest: Effort normal. She has rales (Bibasilar).   PPM site well-healed   Abdominal: Soft. Bowel sounds are normal. She exhibits no distension. There is no tenderness. There is no rebound.   Neurological: She is alert.   Pleasantly confused at times   Skin: Skin is warm and dry. She is not diaphoretic. There is erythema (slight redness BLE).   Psychiatric: She has a normal mood and affect. Her behavior is normal. Thought content normal.   Nursing note and vitals reviewed.      Significant Labs:   ABG:   Recent Labs  Lab 04/17/18  0417   PH 7.446   PCO2 30.9*   HCO3 21.3*   POCSATURATED 95   BE -3   , Blood Culture: No results for input(s): LABBLOO in the last 48 hours., CMP   Recent Labs  Lab 04/17/18  0320 04/17/18  0903   * 134*   K 4.4 3.9   CL 97 99   CO2 20* 23   * 146*   BUN 48* 51*   CREATININE 2.1* 2.0*   CALCIUM 9.2 8.4*   PROT 7.4  --    ALBUMIN 3.3* 2.6*   BILITOT 1.5*  --    ALKPHOS 93  --    AST 37  --    ALT 24  --    ANIONGAP 15 12   ESTGFRAFRICA 24* 26*   EGFRNONAA 21* 22*   , CBC   Recent Labs  Lab 04/17/18  0320 04/17/18  0903   WBC 9.77 8.47   HGB 11.2* 9.2*   HCT 32.6* 27.1*    171   , Troponin   Recent Labs  Lab 04/17/18  0320 04/17/18  1138   TROPONINI 0.375* 0.385*    and All pertinent lab results from the last 24 hours have been reviewed.    Significant Imaging: Echocardiogram:   2D echo with color flow doppler:   Results for orders placed or performed during the hospital encounter of 09/10/16   2D echo with color flow doppler   Result Value Ref Range    EF 55 55 - 65    Mitral Valve Regurgitation TRIVIAL     Diastolic Dysfunction Yes  (A)     Aortic Valve Regurgitation MODERATE (A)     Aortic Valve Stenosis MODERATE TO SEVERE (A)     Est. PA Systolic Pressure 39.48     Mitral Valve Mobility NORMAL     Tricuspid Valve Regurgitation TRIVIAL     and X-Ray: CXR: X-Ray Chest 1 View (CXR): No results found for this visit on 04/17/18. and X-Ray Chest PA and Lateral (CXR): No results found for this visit on 04/17/18.

## 2018-04-17 NOTE — ASSESSMENT & PLAN NOTE
- only given 1 dose IV 40mg lasix in ER  - BNP>3000, with significant SOB  - exacerbation 2/2 noncompliance with last visit in cardiology clinic yesterday (veronica)    - Give lasix 80mg IV BID, first dose now  - strict I/O, daily weights  - Fluid restriction 1L

## 2018-04-17 NOTE — ASSESSMENT & PLAN NOTE
Contributing Nutrition Diagnosis  Altered nutrition related laboratory values     Related to (etiology):   ARF    Signs and Symptoms (as evidenced by):   GFR 22, Phos 4.9, BUN 51    Interventions/Recommendations (treatment strategy):  See above    Nutrition Diagnosis Status:   New

## 2018-04-17 NOTE — PT/OT/SLP EVAL
Occupational Therapy   Evaluation    Name: Diana Turner  MRN: [id number]  Admitting Diagnosis:  Acute on chronic diastolic congestive heart failure      Recommendations:     Discharge Recommendations: nursing facility, skilled  Discharge Equipment Recommendations:  bedside commode  Barriers to discharge:       History:     Occupational Profile:  Living Environment: lives alone, functional indep PTA, info from pt's nurse. 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)     Cardiac pacemaker 10/10/2013    Carotid artery occlusion     Chronic renal insufficiency, stage III (moderate) 10/10/2013    Depression     History of hyperparathyroidism     Hyperlipidemia     Hypertension     Kidney cysts     right US renal artery 9/2013    Obesity     Orbital mass     Pneumonia     as a child, required hospitalization    Right bundle branch block     Sinoatrial node dysfunction     Thyroid disease     goiter    Urinary incontinence        Past Surgical History:   Procedure Laterality Date    ADENOIDECTOMY      APPENDECTOMY      CHOLECYSTECTOMY      HEMORRHOID SURGERY      HYSTERECTOMY      RAHUL; benign reasons    INSERT / REPLACE / REMOVE PACEMAKER  09/25/2008    [facility]    KNEE SURGERY      right    TONSILLECTOMY         Subjective     Chief Complaint: none  Patient/Family stated goals: to get well  Communicated with: pt, and nurse -Radha prior to session.  Pain/Comfort:  · Pain Rating 1: 0/10    Patients cultural, spiritual, Congregational conflicts given the current situation:      Objective:     Patient found with: peripheral IV, pulse ox (continuous),  telemetry, de santiago catheter, blood pressure cuff, oxygen    General Precautions: Standard, fall   Orthopedic Precautions:N/A   Braces: N/A     Occupational Performance:    Bed Mobility:    · Patient completed Rolling/Turning to Left with  maximal assistance  · Patient completed Scooting/Bridging with maximal assistance  · Patient completed Supine to Sit with maximal assistance  · Patient completed Sit to Supine with maximal assistance    Functional Mobility/Transfers:  · Patient completed Sit <> Stand Transfer with moderate assistance and of 2 persons  with  rolling walker   · Functional Mobility: pt side step to HOB with min a x 2 with RW.    Activities of Daily Living:  · Feeding:  supervision to set up assist  · Grooming: minimum assistance to sba  · UB Dressing: maximal assistance -  · LB Dressing: total assistance -  · Toileting: total assistance de santiago    Cognitive/Visual Perceptual:  Cognitive/Psychosocial Skills:     -       Oriented to: Person   -       Follows Commands/attention:Follows one-step commands  -       Communication: clear/fluent  -       Memory: impaired  -       Safety awareness/insight to disability: impaired   -       Mood/Affect/Coping skills/emotional control: Flat affect    Physical Exam:  Upper Extremity Range of Motion:     -       Right Upper Extremity: WFL  -       Left Upper Extremity: WFL  Upper Extremity Strength:  grossly BUE 3+/5    Patient left supine with all lines intact, call button in reach and nurse - Radha notified    AMPA 6 Click:  AMPAC Total Score: 11    Treatment & Education:    Education:    Assessment:     Diana Turner is a 84 y.o. female with a medical diagnosis of Acute on chronic diastolic congestive heart failure.  She presents with impaired self care and fx mobility.  Performance deficits affecting function are weakness, impaired self care skills, impaired endurance, gait instability, impaired functional mobilty, impaired balance, decreased coordination,  "decreased safety awareness.      Rehab Prognosis:  good; patient would benefit from acute skilled OT services to address these deficits and reach maximum level of function.         Clinical Decision Makin.  OT Mod:  "Pt evaluation falls under moderate complexity for evaluation coding due to identification of 3-5 performance deficits noted as stated above. Eval required Min/Mod assistance to complete on this date and detailed assessment(s) were utilized. Moreover, an expanded review of history and occupational profile obtained with additional review of cognitive, physical and psychosocial hx."     Plan:     Patient to be seen 3 x/week to address the above listed problems via self-care/home management, therapeutic activities, therapeutic exercises  · Plan of Care Expires: 18  · Plan of Care Reviewed with: patient    This Plan of care has been discussed with the patient who was involved in its development and understands and is in agreement with the identified goals and treatment plan    GOALS:    Occupational Therapy Goals        Problem: Occupational Therapy Goal    Goal Priority Disciplines Outcome Interventions   Occupational Therapy Goal     OT, PT/OT     Description:  Goals to be met by: 18     Patient will increase functional independence with ADLs by performing:    UE Dressing with Moderate Assistance.  LE Dressing with Maximum Assistance.  Toileting from bedside commode with Moderate Assistance for hygiene and clothing management.   Toilet transfer to bedside commode with Minimal Assistance.  Upper extremity exercise program x10 reps per handout, with supervision.                      Time Tracking:     OT Date of Treatment: 18  OT Start Time: 1335  OT Stop Time: 1400  OT Total Time (min): 25 min    Billable Minutes:Evaluation  x 15 min  Self Care/Home Management x 10 min    Elvira Melo OT  2018    "

## 2018-04-17 NOTE — ED PROVIDER NOTES
SCRIBE #1 NOTE: I, Sophie Chand, am scribing for, and in the presence of, Lane Lin MD. I have scribed the entire note.      History      Chief Complaint   Patient presents with    Shortness of Breath       Review of patient's allergies indicates:   Allergen Reactions    Pravastatin Other (See Comments)    Sulfa (sulfonamide antibiotics) Swelling and Other (See Comments)    Yeast, dried Other (See Comments)        HPI   HPI    4/17/2018, 3:17 AM   History obtained from the EMS      History of Present Illness: Diana Turner is a 84 y.o. female patient with PMHx of dementia who presents to the Emergency Department for SOB which onset gradually a few days ago. Per EMS, patient SpO2 on scene was 84% and patient was breathing 45x/minute. Patient was placed on CPAP on scene. Per EMS, patient is usually able to ambulate with a walker, but today patient stayed in her chair all day. Symptoms are constant and moderate in severity. No mitigating or exacerbating factors. No further complaints or concerns at this time. History limited secondary to severe respiratory distress.    Arrival mode:  [facility]    PCP: Cierra Woods DO       Past Medical History:  Past Medical History:   Diagnosis Date    Anemia     Aortic valve disorders     Arthritis     Benign hypertensive heart disease with heart failure(402.11)     Cardiac pacemaker 10/10/2013    Carotid artery occlusion     Chronic renal insufficiency, stage III (moderate) 10/10/2013    Depression     History of hyperparathyroidism     Hyperlipidemia     Hypertension     Kidney cysts     right US renal artery 9/2013    Obesity     Orbital mass     Pneumonia     as a child, required hospitalization    Right bundle branch block     Sinoatrial node dysfunction     Thyroid disease     goiter    Urinary incontinence        Past Surgical History:  Past Surgical History:   Procedure Laterality Date    ADENOIDECTOMY      APPENDECTOMY      CHOLECYSTECTOMY       HEMORRHOID SURGERY      HYSTERECTOMY      RAHUL; benign reasons    INSERT / REPLACE / REMOVE PACEMAKER  09/25/2008    [employer]    KNEE SURGERY      right    TONSILLECTOMY           Family History:  Family History   Problem Relation Age of Onset    Hypertension Mother     Kidney disease Mother     Heart disease Brother     Hyperlipidemia Brother     Hypertension Brother     Diabetes Cousin     Stroke Maternal Grandmother     Cancer Neg Hx     COPD Neg Hx        Social History:  Social History     Social History Main Topics    Smoking status: Never Smoker    Smokeless tobacco: Never Used    Alcohol use No    Drug use: No    Sexual activity: No       ROS   Review of Systems   Unable to perform ROS: Severe respiratory distress       Physical Exam      Initial Vitals   BP Pulse Resp Temp SpO2   04/17/18 0331 04/17/18 0317 04/17/18 0317 04/17/18 0326 04/17/18 0317   (!) 157/70 91 (!) 49 (!) 101 °F (38.3 °C) 96 %      MAP       04/17/18 0331       99          Physical Exam  Nursing Notes and Vital Signs Reviewed.  Constitutional: Patient is in severe distress. Well-developed and well-nourished.  Head: Atraumatic. Normocephalic.  Eyes: PERRL. EOM intact. Conjunctivae are not pale. No scleral icterus.  ENT: Mucous membranes are moist. Oropharynx is clear and symmetric.    Neck: Supple. Full ROM. No lymphadenopathy.  Cardiovascular: Tachycardic. Regular rhythm. No murmurs, rubs, or gallops. Distal pulses are 2+ and symmetric.  Pulmonary/Chest: Severe respiratory distress. Coarse breath sounds throughout both lungs. No wheezing or rales.  Abdominal: Soft and non-distended.  There is no tenderness.  No rebound, guarding, or rigidity. Good bowel sounds.  Genitourinary: No CVA tenderness  Musculoskeletal: Moves all extremities. No obvious deformities. 3+ BLE edema. No calf tenderness.  Skin: Warm and dry.  Neurological:  Patient is confused, not oriented to person, place, or time. Neuro exam limited secondary  "to patient's condition.  No acute focal neurological deficits are appreciated.  Psychiatric: Exam limited secondary to patient's condition.    ED Course    Critical Care  Date/Time: 4/17/2018 4:33 AM  Performed by: AMELIA JEFFERSON  Authorized by: AMELIA JEFFERSON   Direct patient critical care time: 8 minutes  Additional history critical care time: 8 minutes  Ordering / reviewing critical care time: 7 minutes  Documentation critical care time: 7 minutes  Consulting other physicians critical care time: 7 minutes  Consult with family critical care time: 8 minutes  Total critical care time (exclusive of procedural time) : 45 minutes  Critical care time was exclusive of separately billable procedures and treating other patients.  Critical care was necessary to treat or prevent imminent or life-threatening deterioration of the following conditions: treatment of respiratory distress and pneumonia with BiPAP and IV abx.  Critical care was time spent personally by me on the following activities: blood draw for specimens, development of treatment plan with patient or surrogate, discussions with consultants, interpretation of cardiac output measurements, evaluation of patient's response to treatment, examination of patient, obtaining history from patient or surrogate, ordering and performing treatments and interventions, ordering and review of laboratory studies, ordering and review of radiographic studies, pulse oximetry, re-evaluation of patient's condition, review of old charts and ventilator management.        ED Vital Signs:  Vitals:    04/17/18 0317 04/17/18 0326 04/17/18 0331 04/17/18 0337   BP:   (!) 157/70    Pulse: 91   75   Resp: (!) 49      Temp:  (!) 101 °F (38.3 °C)     TempSrc:  Axillary     SpO2: 96%      Weight: 94.4 kg (208 lb 1.6 oz)      Height: 5' 4" (1.626 m)       04/17/18 0340 04/17/18 0400 04/17/18 0420   BP:  134/61    Pulse: 78 68    Resp:  (!) 39    Temp:   (!) 101 °F (38.3 °C)   TempSrc:  "     SpO2: 97% 97%    Weight:      Height:          Abnormal Lab Results:  Labs Reviewed   CBC W/ AUTO DIFFERENTIAL - Abnormal; Notable for the following:        Result Value    RBC 3.07 (*)     Hemoglobin 11.2 (*)     Hematocrit 32.6 (*)      (*)     MCH 36.5 (*)     RDW 14.8 (*)     Gran # (ANC) 8.5 (*)     Lymph # 0.5 (*)     Gran% 87.6 (*)     Lymph% 5.0 (*)     All other components within normal limits   COMPREHENSIVE METABOLIC PANEL - Abnormal; Notable for the following:     Sodium 132 (*)     CO2 20 (*)     Glucose 180 (*)     BUN, Bld 48 (*)     Creatinine 2.1 (*)     Albumin 3.3 (*)     Total Bilirubin 1.5 (*)     eGFR if  24 (*)     eGFR if non  21 (*)     All other components within normal limits   LACTIC ACID, PLASMA - Abnormal; Notable for the following:     Lactate (Lactic Acid) 2.4 (*)     All other components within normal limits   B-TYPE NATRIURETIC PEPTIDE - Abnormal; Notable for the following:     BNP 3,526 (*)     All other components within normal limits   TROPONIN I - Abnormal; Notable for the following:     Troponin I 0.375 (*)     All other components within normal limits   PROCALCITONIN - Abnormal; Notable for the following:     Procalcitonin 1.58 (*)     All other components within normal limits   ISTAT PROCEDURE - Abnormal; Notable for the following:     POC PCO2 30.9 (*)     POC PO2 69 (*)     POC HCO3 21.3 (*)     All other components within normal limits   CULTURE, BLOOD   CULTURE, BLOOD   CULTURE, URINE   URINALYSIS   MAGNESIUM   PHOSPHORUS   APTT   PROTIME-INR   LACTIC ACID, PLASMA        All Lab Results:  Results for orders placed or performed during the hospital encounter of 04/17/18   CBC auto differential   Result Value Ref Range    WBC 9.77 3.90 - 12.70 K/uL    RBC 3.07 (L) 4.00 - 5.40 M/uL    Hemoglobin 11.2 (L) 12.0 - 16.0 g/dL    Hematocrit 32.6 (L) 37.0 - 48.5 %     (H) 82 - 98 fL    MCH 36.5 (H) 27.0 - 31.0 pg    MCHC 34.4 32.0 -  36.0 g/dL    RDW 14.8 (H) 11.5 - 14.5 %    Platelets 224 150 - 350 K/uL    MPV 10.8 9.2 - 12.9 fL    Gran # (ANC) 8.5 (H) 1.8 - 7.7 K/uL    Lymph # 0.5 (L) 1.0 - 4.8 K/uL    Mono # 0.7 0.3 - 1.0 K/uL    Eos # 0.0 0.0 - 0.5 K/uL    Baso # 0.01 0.00 - 0.20 K/uL    Gran% 87.6 (H) 38.0 - 73.0 %    Lymph% 5.0 (L) 18.0 - 48.0 %    Mono% 7.6 4.0 - 15.0 %    Eosinophil% 0.0 0.0 - 8.0 %    Basophil% 0.1 0.0 - 1.9 %    Platelet Estimate Appears normal     Poik Slight     Poly Occasional     Hypo Occasional     Ovalocytes Occasional     Tear Drop Cells Occasional     Differential Method Automated    Comprehensive metabolic panel   Result Value Ref Range    Sodium 132 (L) 136 - 145 mmol/L    Potassium 4.4 3.5 - 5.1 mmol/L    Chloride 97 95 - 110 mmol/L    CO2 20 (L) 23 - 29 mmol/L    Glucose 180 (H) 70 - 110 mg/dL    BUN, Bld 48 (H) 8 - 23 mg/dL    Creatinine 2.1 (H) 0.5 - 1.4 mg/dL    Calcium 9.2 8.7 - 10.5 mg/dL    Total Protein 7.4 6.0 - 8.4 g/dL    Albumin 3.3 (L) 3.5 - 5.2 g/dL    Total Bilirubin 1.5 (H) 0.1 - 1.0 mg/dL    Alkaline Phosphatase 93 55 - 135 U/L    AST 37 10 - 40 U/L    ALT 24 10 - 44 U/L    Anion Gap 15 8 - 16 mmol/L    eGFR if African American 24 (A) >60 mL/min/1.73 m^2    eGFR if non African American 21 (A) >60 mL/min/1.73 m^2   Lactic acid, plasma #1   Result Value Ref Range    Lactate (Lactic Acid) 2.4 (H) 0.5 - 2.2 mmol/L   Urinalysis   Result Value Ref Range    Specimen UA Urine, Catheterized     Color, UA Yellow Yellow, Straw, Lori    Appearance, UA Clear Clear    pH, UA 5.0 5.0 - 8.0    Specific Gravity, UA 1.020 1.005 - 1.030    Protein, UA Negative Negative    Glucose, UA Negative Negative    Ketones, UA Negative Negative    Bilirubin (UA) Negative Negative    Occult Blood UA Negative Negative    Nitrite, UA Negative Negative    Urobilinogen, UA Negative <2.0 EU/dL    Leukocytes, UA Negative Negative   Magnesium   Result Value Ref Range    Magnesium 1.6 1.6 - 2.6 mg/dL   Phosphorus   Result Value  Ref Range    Phosphorus 4.2 2.7 - 4.5 mg/dL   APTT   Result Value Ref Range    aPTT 26.4 21.0 - 32.0 sec   Protime-INR   Result Value Ref Range    Prothrombin Time 11.2 9.0 - 12.5 sec    INR 1.1 0.8 - 1.2   Brain natriuretic peptide   Result Value Ref Range    BNP 3,526 (H) 0 - 99 pg/mL   Troponin I   Result Value Ref Range    Troponin I 0.375 (H) 0.000 - 0.026 ng/mL   Procalcitonin   Result Value Ref Range    Procalcitonin 1.58 (H) <0.25 ng/mL   ISTAT PROCEDURE   Result Value Ref Range    POC PH 7.446 7.35 - 7.45    POC PCO2 30.9 (L) 35 - 45 mmHg    POC PO2 69 (L) 80 - 100 mmHg    POC HCO3 21.3 (L) 24 - 28 mmol/L    POC BE -3 -2 to 2 mmol/L    POC SATURATED O2 95 95 - 100 %    Rate 18     Sample ARTERIAL     Site RR     Allens Test Pass     DelSys CPAP/BiPAP     Mode BiPAP     FiO2 35     IP 12     EP 6        Imaging Results:  Imaging Results          X-Ray Chest AP Portable (In process)                Ordered, reviewed, and independently interpreted by the ED provider.  Study: X-ray Chest  Findings: Cardiomegaly. Infiltrate R lung field. No pneumothorax.    The EKG was ordered, reviewed, and independently interpreted by the ED provider.  Interpretation time: 0320  Rate: 83 BPM  Rhythm: AV dual-paced rhythm  Interpretation: Abnormal ECG. No STEMI.  When compared to EKG performed 8/21/17, there are no significant changes.         The Emergency Provider reviewed the vital signs and test results, which are outlined above.    ED Discussion     3:42 AM: Re-evaluated pt. Patient is on BiPAP.    3:52 AM: Patient's family arrived to ED. Family reports PMHx of dementia. Family reports decline in mental status in the last week. Daughter reports patient has been off her medications for the last week. Daughter reports patient was evaluated by her PCP and cardiologist yesterday for SOB and was told she had mild crackles in lung bases. Daughter states SOB has worsened.     4:00 AM: Re-evaluated pt. Patient is more alert from a  respiratory standpoint.  On re-evaluation, patient responds to commands, which she previously did not. Patient answers questions when talking to her daughter, which she previously did not.    4:31 AM: Discussed case with Cara Garrett NP ([facility]). Dr. Zheng agrees with current care and management of pt and accepts admission.   Admitting Service: Hospital medicine   Admitting Physician: Dr. Zheng  Admit to: ICU    Patient's troponin noted. Patient has hx of CHF and renal disease. Discussed this with hospital medicine, will trend troponin and if elevated, patient will be started on anticoagulant.    4:37 AM: Re-evaluated pt. I have discussed test results, shared treatment plan, and the need for admission with patient and family at bedside. Pt and family express understanding at this time and agree with all information. All questions answered. Pt and family have no further questions or concerns at this time. Pt is ready for admit.      ED Medication(s):  Medications   cefTRIAXone (ROCEPHIN) 2 g in dextrose 5 % 50 mL IVPB (2 g Intravenous New Bag 4/17/18 0435)   furosemide injection 40 mg (not administered)   acetaminophen suppository 650 mg (650 mg Rectal Given 4/17/18 0420)   furosemide injection 40 mg (40 mg Intravenous Given 4/17/18 0428)       New Prescriptions    No medications on file             Medical Decision Making    Medical Decision Making:   Clinical Tests:   Lab Tests: Ordered and Reviewed  Radiological Study: Ordered and Reviewed  Medical Tests: Ordered and Reviewed           Scribe Attestation:   Scribe #1: I performed the above scribed service and the documentation accurately describes the services I performed. I attest to the accuracy of the note.    Attending:   Physician Attestation Statement for Scribe #1: I, Lane Lin MD, personally performed the services described in this documentation, as scribed by Sophie Chand, in my presence, and it is both accurate and complete.           Clinical Impression       ICD-10-CM ICD-9-CM   1. Respiratory distress R06.03 786.09   2. Chest pain R07.9 786.50   3. Pneumonia due to infectious organism, unspecified laterality, unspecified part of lung J18.9 136.9     484.8   4. Congestive heart failure, unspecified congestive heart failure chronicity, unspecified congestive heart failure type I50.9 428.0   5. Renal failure, unspecified chronicity N19 586   6. Elevated troponin R74.8 790.6       Disposition:   Disposition: Admitted (ICU)  Condition: Serious         Lane Lin MD  04/17/18 2868

## 2018-04-17 NOTE — PT/OT/SLP EVAL
Physical Therapy Evaluation    Patient Name:  Diana Turner   MRN:  [id number]    Recommendations:     Discharge Recommendations:  nursing facility, skilled   Discharge Equipment Recommendations: none   Barriers to discharge: Decreased caregiver support    Assessment:     Diana Turner is a 84 y.o. female admitted with a medical diagnosis of Acute on chronic diastolic congestive heart failure.  She presents with the following impairments/functional limitations:  weakness, impaired endurance, impaired functional mobilty, edema, decreased lower extremity function, decreased upper extremity function, gait instability, impaired self care skills, impaired balance, decreased coordination, decreased safety awareness, decreased ROM .    Rehab Prognosis:  GOOD; patient would benefit from acute skilled PT services to address these deficits and reach maximum level of function.      Recent Surgery: * No surgery found *      Plan:     During this hospitalization, patient to be seen   to address the above listed problems via therapeutic activities, gait training, therapeutic exercises  · Plan of Care Expires:  04/24/18   Plan of Care Reviewed with: patient    Subjective     Communicated with NURSE EASON AND EPIC CHART REVIEW  prior to session.  Patient found SUP IN BED  upon PT entry to room, agreeable to evaluation.      Chief Complaint: NONE   Patient comments/goals: NONE   Pain/Comfort:  · Pain Rating 1: 0/10  · Pain Rating Post-Intervention 1: 0/10    Patients cultural, spiritual, Lutheran conflicts given the current situation:      Living Environment:  PT LIVES AT HOME ALONE. PT IS POOR HISTORIAN  Prior to admission, patients level of function was MARK WITH RW.  Patient has the following equipment: walker, rolling, wheelchair.  DME owned (not currently used): none.  Upon discharge, patient will have assistance from UNKNOWN.    Objective:     Patient found with: peripheral IV, oxygen, pulse ox (continuous), telemetry, de santiago  catheter, blood pressure cuff     General Precautions: Standard, fall   Orthopedic Precautions:N/A   Braces: N/A     Exams:  · RLE ROM: LIMITED  · RLE Strength: LIMITED  · LLE ROM: LIMITED  · LLE Strength: LIMITED    Functional Mobility:  · PT SUP.SIT EOB WITH MAX A AND SCOOTED TO EOB WITH MOD A. PT STOOD WITH RW AND MOD A X 2 AND SIDE STEPPED TO LEFT WITH MIN A X 3 STEPS. PT NOT OOB TO CHAIR PER NURSE JENARO REQUEST. PT SEATED AND SUP IN BED WITH MAX A. PT LEFT SUP WITH ALL NEEDS MET.     AM-PAC 6 CLICK MOBILITY  Total Score:11    Patient left supine with call button in reach.    GOALS:    Physical Therapy Goals        Problem: Physical Therapy Goal    Goal Priority Disciplines Outcome Goal Variances Interventions   Physical Therapy Goal     PT/OT, PT      Description:  PT WILL BE SEEN FOR P.T. FOR A MIN OF 5 OUT OF 7 DAYS A WEEK  LT18  1. PT WILL COMPLETE BED MOBILITY WITH MIN A  2. PT WILL T/F TO CHAIR WITH MIN A  3. PT WILL GT TRAIN X 50' WITH RW AND MIN A  4. 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.11)     Cardiac pacemaker 10/10/2013    Carotid artery occlusion     Chronic renal insufficiency, stage III (moderate) 10/10/2013    Depression     History of hyperparathyroidism     Hyperlipidemia     Hypertension     Kidney cysts     right US renal artery 2013    Obesity     Orbital mass     Pneumonia     as a child, required hospitalization    Right bundle branch block     Sinoatrial node dysfunction     Thyroid disease     goiter    Urinary incontinence        Past Surgical History:   Procedure Laterality Date    ADENOIDECTOMY      APPENDECTOMY      CHOLECYSTECTOMY      HEMORRHOID SURGERY      HYSTERECTOMY      RAHUL; benign reasons    INSERT /  REPLACE / REMOVE PACEMAKER  09/25/2008    [employer]    KNEE SURGERY      right    TONSILLECTOMY         Clinical Decision Making:     History  Co-morbidities and personal factors that may impact the plan of care Examination  Body Structures and Functions, activity limitations and participation restrictions that may impact the plan of care Clinical Presentation   Decision Making/ Complexity Score   Co-morbidities:   [] Time since onset of injury / illness / exacerbation  [] Status of current condition  []Patient's cognitive status and safety concerns    [] Multiple Medical Problems (see med hx)  Personal Factors:   [] Patient's age  [] Prior Level of function   [] Patient's home situation (environment and family support)  [] Patient's level of motivation  [] Expected progression of patient      HISTORY:(criteria)    [] 94714 - no personal factors/history    [] 43021 - has 1-2 personal factor/comorbidity     [] 74582 - has >3 personal factor/comorbidity     Body Regions:  [] Objective examination findings  [] Head     []  Neck  [] Trunk   [] Upper Extremity  [] Lower Extremity    Body Systems:  [] For communication ability, affect, cognition, language, and learning style: the assessment of the ability to make needs known, consciousness, orientation (person, place, and time), expected emotional /behavioral responses, and learning preferences (eg, learning barriers, education  needs)  [] For the neuromuscular system: a general assessment of gross coordinated movement (eg, balance, gait, locomotion, transfers, and transitions) and motor function  (motor control and motor learning)  [] For the musculoskeletal system: the assessment of gross symmetry, gross range of motion, gross strength, height, and weight  [] For the integumentary system: the assessment of pliability(texture), presence of scar formation, skin color, and skin integrity  [] For cardiovascular/pulmonary system: the assessment of heart rate, respiratory  rate, blood pressure, and edema     Activity limitations:    [] Patient's cognitive status and saf ety concerns          [] Status of current condition      [] Weight bearing restriction  [] Cardiopulmunary Restriction    Participation Restrictions:   [] Goals and goal agreement with the patient     [] Rehab potential (prognosis) and probable outcome      Examination of Body System: (criteria)    [] 04552 - addressing 1-2 elements    [] 98897 - addressing a total of 3 or more elements     [] 91335 -  Addressing a total of 4 or more elements         Clinical Presentation: (criteria)  Choose one     On examination of body system using standardized tests and measures patient presents with (CHOOSE ONE) elements from any of the following: body structures and functions, activity limitations, and/or participation restrictions.  Leading to a clinical presentation that is considered (CHOOSE ONE)                              Clinical Decision Making  (Eval Complexity):  Choose One     Time Tracking:     PT Received On: 04/17/18  PT Start Time: 1355     PT Stop Time: 1420  PT Total Time (min): 25 min     Billable Minutes: Evaluation 15 and Therapeutic Activity 10      Karina Pratt, PT  04/17/2018

## 2018-04-17 NOTE — CONSULTS
[facility] -   Adult Nutrition  Consult Note    SUMMARY     Recommendations    Recommendation/Intervention: 1. Continue current diet. 2. If PO intake < 50 %, add boost plus TID. 3. Assistance with meals as needed. 4. Will continue to monitor.   Goals: Meet > 85 % EEN/EPN while admitted  Nutrition Goal Status: new  Communication of RD Recs:  (POC, sticky note)    Reason for Assessment    Reason for Assessment: consult  Dx:  1. Respiratory distress    2. Chest pain    3. Pneumonia due to infectious organism, unspecified laterality, unspecified part of lung    4. Congestive heart failure, unspecified congestive heart failure chronicity, unspecified congestive heart failure type    5. Renal failure, unspecified chronicity    6. Elevated troponin    7. Acute respiratory failure      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.11)     Cardiac pacemaker 10/10/2013    Carotid artery occlusion     Chronic renal insufficiency, stage III (moderate) 10/10/2013    Depression     History of hyperparathyroidism     Hyperlipidemia     Hypertension     Kidney cysts     right US renal artery 9/2013    Obesity     Orbital mass     Pneumonia     as a child, required hospitalization    Right bundle branch block     Sinoatrial node dysfunction     Thyroid disease     goiter    Urinary incontinence        Interdisciplinary Rounds: attended  General Information Comments: Admitted for SOB. Pt has dementia. No family members at bedside.  Per ICU staff,  Code Status to be discussed with family.   Nutrition Discharge Planning: Cardiac Renal diet    Nutrition Risk Screen    Nutrition Risk Screen: other (see comments) (anjali)    Nutrition/Diet History    Do you have any cultural, spiritual, Quaker conflicts, given your current situation?:  "anjali    Anthropometrics    Temp: 98 °F (36.7 °C)  Height Method: Stated  Height: 5' 4" (162.6 cm)  Height (inches): 64 in  Weight Method: Bed Scale  Weight: 90 kg (198 lb 6.6 oz)  Weight (lb): 198.42 lb  Ideal Body Weight (IBW), Female: 120 lb  % Ideal Body Weight, Female (lb): 165.35 lb  BMI (Calculated): 34.1  BMI Grade: 30 - 34.9- obesity - grade I       Lab/Procedures/Meds    Pertinent Labs Reviewed: reviewed  BMP  Lab Results   Component Value Date     (L) 04/17/2018    K 3.9 04/17/2018    CL 99 04/17/2018    CO2 23 04/17/2018    BUN 51 (H) 04/17/2018    CREATININE 2.0 (H) 04/17/2018    CALCIUM 8.4 (L) 04/17/2018    ANIONGAP 12 04/17/2018    ESTGFRAFRICA 26 (A) 04/17/2018    EGFRNONAA 22 (A) 04/17/2018     Lab Results   Component Value Date    CALCIUM 8.4 (L) 04/17/2018    PHOS 4.9 (H) 04/17/2018    PHOS 4.9 (H) 04/17/2018     Lab Results   Component Value Date    ALBUMIN 2.6 (L) 04/17/2018       Pertinent Medications Reviewed: reviewed    Physical Findings/Assessment    Overall Physical Appearance: obese  Oral/Mouth Cavity:  (decay/caries)  Skin:  (Gerardo 11, wound abrasion arm)    Estimated/Assessed Needs    Weight Used For Calorie Calculations: 90 kg (198 lb 6.6 oz)  Energy Calorie Requirements (kcal): 1602  Energy Need Method: [place] (x1.2)  Protein Requirements: 109 gm/d  Weight Used For Protein Calculations: 54.4 kg (120 lb) (IBW ~ Obese ICU)     Fluid Need Method: RDA Method (or per MD)  RDA Method (mL): 1602  CHO Requirement: 50 % EEN      Nutrition Prescription Ordered    Current Diet Order: Renal 1000 ml fluid    Evaluation of Received Nutrient/Fluid Intake    Intake/Output Summary (Last 24 hours) at 04/17/18 1103  Last data filed at 04/17/18 0700   Gross per 24 hour   Intake              300 ml   Output              425 ml   Net             -125 ml          % Intake of Estimated Energy Needs: 0 - 25 %  % Meal Intake: 0 - 25 %    Nutrition Risk          Assessment and Plan    Acute on " chronic renal failure    Contributing Nutrition Diagnosis  Altered nutrition related laboratory values     Related to (etiology):   ARF    Signs and Symptoms (as evidenced by):   GFR 22, Phos 4.9, BUN 51    Interventions/Recommendations (treatment strategy):  See above    Nutrition Diagnosis Status:   New               Monitor and Evaluation    Food and Nutrient Intake: energy intake, food and beverage intake  Food and Nutrient Adminstration: diet order  Anthropometric Measurements: weight  Biochemical Data, Medical Tests and Procedures: electrolyte and renal panel, glucose/endocrine profile  Nutrition-Focused Physical Findings: overall appearance, skin     Nutrition Follow-Up    RD Follow-up?: Yes (2xweekly)

## 2018-04-17 NOTE — CONSULTS
[facility] -   Critical Care Medicine  Consult Note    Patient Name: Diana Turner  MRN: [id number]  Admission Date: 4/17/2018  Hospital Length of Stay: 0 days  Code Status: Full Code  Attending Physician: Tam Ferrer MD   Primary Care Provider: Cierra Woods DO   Principal Problem: Acute on chronic diastolic congestive heart failure      Subjective:     HPI:  84-year-old female patient brought to the hospital from home for respiratory distress.  The day of presentation she became more shortness of breath however her symptoms have been worsening for a few days.  Patient has dementia and usually ambulates with a walker at home.    Hospital/ICU Course:  Upon admission she was found to be hypoxemic and her O2 sats were in the 80%.  She was found in the pulmonary edema, high temperature of 101, she was placed on BiPAP overnight .    No new subjective & objective note has been filed under this hospital service since the last note was generated.    Assessment/Plan:     Pulmonary   Acute respiratory failure    O2 keep sat above 90%.  Use BiPAP when necessary for distress.        CAP (community acquired pneumonia)    Continue IV Rocephin and azithromycin.  Follow on blood culture and urine culture.  Repeat chest x-ray in a.m.        Cardiac/Vascular   * Acute on chronic diastolic congestive heart failure    Strict I's and O's  IV Lasix 80 mg to 12 hours.  Check repeat 2-D echo        Renal/   Acute on chronic renal failure    Monitor I's and O's.  Monitor BMP.            Critical Care Daily Checklist:    A: Awake: RASS Goal/Actual Goal:   no sedation  Actual:   awake following commands    B: Spontaneous Breathing Trial Performed?   not applicable    C: SAT & SBT Coordinated?     not applicable        D: Delirium: CAM-ICU     E: Early Mobility Performed? Yes   F: Feeding Goal: Goals: Meet > 85 % EEN/EPN while admitted  Status: Nutrition Goal Status: new   Current Diet Order   Procedures    Diet renal  Fluid - 1000mL     Order Specific Question:   Fluid restriction:     Answer:   Fluid - 1000mL      AS: Analgesia/Sedation  of old sedation    T: Thromboembolic Prophylaxis Y heparin subcutaneous Yes   H: HOB > 300 Yes   U: Stress Ulcer Prophylaxis (if needed)  applicable    G: Glucose Control As per ICU protocol   B: Bowel Function  Y   I: Indwelling Catheter (Lines & Jama) Necessity Y   D: De-escalation of Antimicrobials/Pharmacotherapies Y    Plan for the day/ETD Y    Code Status:  Family/Goals of Care: Full Code       Critical Care Time: 35minutes  Critical secondary to Patient has a condition that poses threat to life and bodily function: Severe Respiratory Distress     Critical care was time spent personally by me on the following activities: development of treatment plan with patient or surrogate and bedside caregivers, discussions with consultants, evaluation of patient's response to treatment, examination of patient, ordering and performing treatments and interventions, ordering and review of laboratory studies, ordering and review of radiographic studies, pulse oximetry, re-evaluation of patient's condition. This critical care time did not overlap with that of any other provider or involve time for any procedures.    Thank you for your consult. I will follow-up with patient. Please contact us if you have any additional questions.     Jimbo Cheek MD  Critical Care Medicine  [facility] -

## 2018-04-17 NOTE — ASSESSMENT & PLAN NOTE
- 2/2 work/demand ischemia from CHF exacerbation  - EKG showing AV paced rhythm, ST abnormalities appears to have J point elevation in precordial leads    - Repeat EKG  - Consult cardiology  - trend troponin x 6

## 2018-04-17 NOTE — PLAN OF CARE
Assessment done per MR. Patient very lethargic and drowsy     04/17/18 1202   Discharge Assessment   Assessment Type Discharge Planning Assessment   Confirmed/corrected address and phone number on facesheet? No   Assessment information obtained from? Medical Record   Expected Length of Stay (days) (TBD)   Communicated expected length of stay with patient/caregiver no   Prior to hospitilization cognitive status: Unable to Assess   Current cognitive status: Unable to Assess  (Patient very drowsy and unable to awaken)   Lives With child(mana), adult   Able to Return to Prior Arrangements unable to determine at this time (comments)   Is patient able to care for self after discharge? Unable to determine at this time (comments)   Readmission Within The Last 30 Days other (see comments)   Patient currently being followed by outpatient case management? Unable to determine (comments)   Equipment Currently Used at Home walker, rolling;wheelchair;bath bench   Do you have any problems affording any of your prescribed medications? TBD   Discharge Plan A Skilled Nursing Facility   Discharge Plan B New Nursing Home placement - [facility] care facility   Patient/Family In Agreement With Plan unable to assess    and CM inable to perform assessment. CM called dtr and left a VM. Cm to f/u for safe transition

## 2018-04-17 NOTE — PLAN OF CARE
Problem: Patient Care Overview  Goal: Plan of Care Review  PT REQUIRES MOD A FOR T/F TO CHAIR WITH RW    Outcome: Ongoing (interventions implemented as appropriate)  Maintained oxygen needs/O2 sats. Diet advanced. Turned q 2hs. PT/OT consult.

## 2018-04-18 PROBLEM — I27.20 PULMONARY HYPERTENSION: Status: ACTIVE | Noted: 2018-04-18

## 2018-04-18 PROBLEM — I50.9 ACUTE EXACERBATION OF CHF (CONGESTIVE HEART FAILURE): Status: ACTIVE | Noted: 2018-04-18

## 2018-04-18 PROBLEM — I35.0 SEVERE AORTIC STENOSIS: Status: ACTIVE | Noted: 2018-04-18

## 2018-04-18 LAB
ALBUMIN SERPL BCP-MCNC: 2.5 G/DL
ANION GAP SERPL CALC-SCNC: 12 MMOL/L
AORTIC VALVE REGURGITATION: ABNORMAL
AORTIC VALVE STENOSIS: ABNORMAL
BACTERIA UR CULT: NO GROWTH
BASOPHILS # BLD AUTO: 0.01 K/UL
BASOPHILS NFR BLD: 0.1 %
BUN SERPL-MCNC: 59 MG/DL
CALCIUM SERPL-MCNC: 8.8 MG/DL
CHLORIDE SERPL-SCNC: 100 MMOL/L
CO2 SERPL-SCNC: 24 MMOL/L
CREAT SERPL-MCNC: 2.1 MG/DL
DIASTOLIC DYSFUNCTION: YES
DIFFERENTIAL METHOD: ABNORMAL
EOSINOPHIL # BLD AUTO: 0 K/UL
EOSINOPHIL NFR BLD: 0.4 %
ERYTHROCYTE [DISTWIDTH] IN BLOOD BY AUTOMATED COUNT: 14.9 %
EST. GFR  (AFRICAN AMERICAN): 24 ML/MIN/1.73 M^2
EST. GFR  (NON AFRICAN AMERICAN): 21 ML/MIN/1.73 M^2
ESTIMATED PA SYSTOLIC PRESSURE: 83
GLUCOSE SERPL-MCNC: 104 MG/DL
HCT VFR BLD AUTO: 27.7 %
HGB BLD-MCNC: 9.3 G/DL
LYMPHOCYTES # BLD AUTO: 0.7 K/UL
LYMPHOCYTES NFR BLD: 7.6 %
MAGNESIUM SERPL-MCNC: 1.7 MG/DL
MCH RBC QN AUTO: 35.6 PG
MCHC RBC AUTO-ENTMCNC: 33.6 G/DL
MCV RBC AUTO: 106 FL
MITRAL VALVE REGURGITATION: ABNORMAL
MONOCYTES # BLD AUTO: 1 K/UL
MONOCYTES NFR BLD: 11.3 %
NEUTROPHILS # BLD AUTO: 7.2 K/UL
NEUTROPHILS NFR BLD: 80.6 %
PHOSPHATE SERPL-MCNC: 4.2 MG/DL
PHOSPHATE SERPL-MCNC: 4.2 MG/DL
PLATELET # BLD AUTO: 188 K/UL
PMV BLD AUTO: 10.8 FL
POTASSIUM SERPL-SCNC: 3.8 MMOL/L
RBC # BLD AUTO: 2.61 M/UL
RETIRED EF AND QEF - SEE NOTES: 50 (ref 55–65)
SODIUM SERPL-SCNC: 136 MMOL/L
TRICUSPID VALVE REGURGITATION: ABNORMAL
TROPONIN I SERPL DL<=0.01 NG/ML-MCNC: 0.17 NG/ML
TROPONIN I SERPL DL<=0.01 NG/ML-MCNC: 0.18 NG/ML
TROPONIN I SERPL DL<=0.01 NG/ML-MCNC: 0.23 NG/ML
TROPONIN I SERPL DL<=0.01 NG/ML-MCNC: 0.24 NG/ML
WBC # BLD AUTO: 8.91 K/UL

## 2018-04-18 PROCEDURE — 27000221 HC OXYGEN, UP TO 24 HOURS

## 2018-04-18 PROCEDURE — 84484 ASSAY OF TROPONIN QUANT: CPT | Mod: 91

## 2018-04-18 PROCEDURE — 93306 TTE W/DOPPLER COMPLETE: CPT | Mod: 26,,, | Performed by: INTERNAL MEDICINE

## 2018-04-18 PROCEDURE — 36415 COLL VENOUS BLD VENIPUNCTURE: CPT

## 2018-04-18 PROCEDURE — 25000003 PHARM REV CODE 250: Performed by: INTERNAL MEDICINE

## 2018-04-18 PROCEDURE — 25000003 PHARM REV CODE 250: Performed by: HOSPITALIST

## 2018-04-18 PROCEDURE — 93306 TTE W/DOPPLER COMPLETE: CPT

## 2018-04-18 PROCEDURE — 99232 SBSQ HOSP IP/OBS MODERATE 35: CPT | Mod: ,,, | Performed by: INTERNAL MEDICINE

## 2018-04-18 PROCEDURE — 63600175 PHARM REV CODE 636 W HCPCS: Performed by: HOSPITALIST

## 2018-04-18 PROCEDURE — 94640 AIRWAY INHALATION TREATMENT: CPT

## 2018-04-18 PROCEDURE — 85025 COMPLETE CBC W/AUTO DIFF WBC: CPT

## 2018-04-18 PROCEDURE — 80069 RENAL FUNCTION PANEL: CPT

## 2018-04-18 PROCEDURE — 97530 THERAPEUTIC ACTIVITIES: CPT

## 2018-04-18 PROCEDURE — 25000242 PHARM REV CODE 250 ALT 637 W/ HCPCS: Performed by: HOSPITALIST

## 2018-04-18 PROCEDURE — 21400001 HC TELEMETRY ROOM

## 2018-04-18 PROCEDURE — 83735 ASSAY OF MAGNESIUM: CPT

## 2018-04-18 RX ORDER — GUAIFENESIN 100 MG/5ML
200 SOLUTION ORAL EVERY 4 HOURS PRN
Status: DISCONTINUED | OUTPATIENT
Start: 2018-04-18 | End: 2018-04-20 | Stop reason: HOSPADM

## 2018-04-18 RX ADMIN — HEPARIN SODIUM 5000 UNITS: 5000 INJECTION, SOLUTION INTRAVENOUS; SUBCUTANEOUS at 09:04

## 2018-04-18 RX ADMIN — SENNOSIDES 1 TABLET: 8.6 TABLET, FILM COATED ORAL at 09:04

## 2018-04-18 RX ADMIN — ALLOPURINOL 200 MG: 100 TABLET ORAL at 09:04

## 2018-04-18 RX ADMIN — GUAIFENESIN 200 MG: 200 SOLUTION ORAL at 06:04

## 2018-04-18 RX ADMIN — ATENOLOL 100 MG: 50 TABLET ORAL at 09:04

## 2018-04-18 RX ADMIN — IPRATROPIUM BROMIDE AND ALBUTEROL SULFATE 3 ML: .5; 3 SOLUTION RESPIRATORY (INHALATION) at 06:04

## 2018-04-18 RX ADMIN — FUROSEMIDE 80 MG: 10 INJECTION, SOLUTION INTRAMUSCULAR; INTRAVENOUS at 06:04

## 2018-04-18 RX ADMIN — GUAIFENESIN AND DEXTROMETHORPHAN HYDROBROMIDE 1 TABLET: 600; 30 TABLET, EXTENDED RELEASE ORAL at 09:04

## 2018-04-18 RX ADMIN — DILTIAZEM HYDROCHLORIDE 240 MG: 120 CAPSULE, COATED, EXTENDED RELEASE ORAL at 09:04

## 2018-04-18 RX ADMIN — HEPARIN SODIUM 5000 UNITS: 5000 INJECTION, SOLUTION INTRAVENOUS; SUBCUTANEOUS at 03:04

## 2018-04-18 RX ADMIN — FUROSEMIDE 80 MG: 10 INJECTION, SOLUTION INTRAMUSCULAR; INTRAVENOUS at 09:04

## 2018-04-18 RX ADMIN — AZITHROMYCIN MONOHYDRATE 500 MG: 500 INJECTION, POWDER, LYOPHILIZED, FOR SOLUTION INTRAVENOUS at 05:04

## 2018-04-18 RX ADMIN — HEPARIN SODIUM 5000 UNITS: 5000 INJECTION, SOLUTION INTRAVENOUS; SUBCUTANEOUS at 05:04

## 2018-04-18 RX ADMIN — ASPIRIN 81 MG 81 MG: 81 TABLET ORAL at 09:04

## 2018-04-18 RX ADMIN — CEFTRIAXONE SODIUM 1 G: 1 INJECTION, POWDER, FOR SOLUTION INTRAMUSCULAR; INTRAVENOUS at 04:04

## 2018-04-18 RX ADMIN — EZETIMIBE 10 MG: 10 TABLET ORAL at 09:04

## 2018-04-18 NOTE — SUBJECTIVE & OBJECTIVE
Interval History:   Review of Systems   Constitutional: Negative for fever.        No fever today   HENT: Negative for nosebleeds.    Eyes: Negative for redness.   Respiratory: Positive for shortness of breath.    Cardiovascular: Positive for leg swelling. Negative for chest pain.   Gastrointestinal: Negative for abdominal pain.   Genitourinary: Positive for urgency. Negative for hematuria.   Musculoskeletal: Negative for falls.   Skin: Positive for rash.   Neurological: Negative for seizures and loss of consciousness.   Endo/Heme/Allergies: Bruises/bleeds easily.   Psychiatric/Behavioral: Negative for suicidal ideas.       Objective:     Vital Signs (Most Recent):  Temp: 97.9 °F (36.6 °C) (04/18/18 0800)  Pulse: 61 (04/18/18 1030)  Resp: (!) 27 (04/18/18 1030)  BP: (!) 129/43 (04/18/18 1030)  SpO2: 97 % (04/18/18 1030) Vital Signs (24h Range):  Temp:  [97.6 °F (36.4 °C)-98.9 °F (37.2 °C)] 97.9 °F (36.6 °C)  Pulse:  [59-69] 61  Resp:  [17-31] 27  SpO2:  [96 %-100 %] 97 %  BP: ()/(22-57) 129/43     Weight: 93.4 kg (205 lb 14.6 oz)  Body mass index is 35.34 kg/m².      Intake/Output Summary (Last 24 hours) at 04/18/18 1152  Last data filed at 04/18/18 0900   Gross per 24 hour   Intake              550 ml   Output             1020 ml   Net             -470 ml       Physical Exam   Constitutional: She appears well-developed and well-nourished.   HENT:   Head: Atraumatic.   Eyes: EOM are normal.   Neck: Neck supple.   Cardiovascular: Normal rate.    Pulmonary/Chest: Effort normal. No respiratory distress. She has no wheezes. She has rales. She exhibits no tenderness.   Abdominal: Soft. There is no tenderness.   Genitourinary:   Genitourinary Comments: Jama in place   Musculoskeletal: She exhibits edema.   Improving lower extremity edema.   Neurological: She is alert.   Oriented to place.   Skin: Skin is warm. Rash noted.   Bruises over upper and lower extremities.   Psychiatric:   Not agitated       Vents:        Lines/Drains/Airways     Drain                 Urethral Catheter 04/17/18 0352 Latex 16 Fr. 1 day          Peripheral Intravenous Line                 Peripheral IV - Single Lumen 04/17/18 0323 Right Antecubital 1 day                Significant Labs:    CBC/Anemia Profile:    Recent Labs  Lab 04/17/18  0320 04/17/18  0903 04/18/18  0550   WBC 9.77 8.47 8.91   HGB 11.2* 9.2* 9.3*   HCT 32.6* 27.1* 27.7*    171 188   * 106* 106*   RDW 14.8* 14.8* 14.9*        Chemistries:    Recent Labs  Lab 04/17/18  0320 04/17/18  0903 04/18/18  0550   * 134* 136   K 4.4 3.9 3.8   CL 97 99 100   CO2 20* 23 24   BUN 48* 51* 59*   CREATININE 2.1* 2.0* 2.1*   CALCIUM 9.2 8.4* 8.8   ALBUMIN 3.3* 2.6* 2.5*   PROT 7.4  --   --    BILITOT 1.5*  --   --    ALKPHOS 93  --   --    ALT 24  --   --    AST 37  --   --    MG 1.6 1.7 1.7   PHOS 4.2 4.9*  4.9* 4.2  4.2     Echo :      1 - Severe left atrial enlargement.     2 - Concentric hypertrophy.     3 - No wall motion abnormalities.     4 - Low normal to mildly depressed left ventricular systolic function (EF 50-55%).     5 - Impaired LV relaxation, elevated LAP (grade 2 diastolic dysfunction).     6 - Low normal to mildly depressed right ventricular systolic function .     7 - Pulmonary hypertension. The estimated PA systolic pressure is 83 mmHg.     8 - Low flow, low gradient aortic valve stenosis with preserved EF ((ELIZABETH 0.61 cm2, AVAi 0.31 cm2/m2, peak aortic jet velocity 4.0 m/s,MG 37 mmHg, EF 50%,SVi 30 mL/m2).     9 - Mild aortic regurgitation.     10 - Mild to moderate mitral regurgitation.     11 - Mild to moderate tricuspid regurgitation.     12 - Intermediate central venous pressure.     13 - Right pleural effusion.   Significant Imaging:    CXR: I have reviewed all pertinent results/findings within the past 24 hours and my personal findings are:  Stable bilateral multifocal infiltrates.

## 2018-04-18 NOTE — PROGRESS NOTES
[facility]  Pulmonology  Progress Note    Patient Name: Diana Turner  MRN: [id number]  Admission Date: 4/17/2018  Hospital Length of Stay: 1 days  Code Status: Full Code  Attending Provider: Tam Ferrer MD  Primary Care Provider: Cierra Woods DO   Principal Problem: Acute on chronic diastolic congestive heart failure    Subjective:     Interval History:   Review of Systems   Constitutional: Negative for fever.        No fever today   HENT: Negative for nosebleeds.    Eyes: Negative for redness.   Respiratory: Positive for shortness of breath.    Cardiovascular: Positive for leg swelling. Negative for chest pain.   Gastrointestinal: Negative for abdominal pain.   Genitourinary: Positive for urgency. Negative for hematuria.   Musculoskeletal: Negative for falls.   Skin: Positive for rash.   Neurological: Negative for seizures and loss of consciousness.   Endo/Heme/Allergies: Bruises/bleeds easily.   Psychiatric/Behavioral: Negative for suicidal ideas.       Objective:     Vital Signs (Most Recent):  Temp: 97.9 °F (36.6 °C) (04/18/18 0800)  Pulse: 61 (04/18/18 1030)  Resp: (!) 27 (04/18/18 1030)  BP: (!) 129/43 (04/18/18 1030)  SpO2: 97 % (04/18/18 1030) Vital Signs (24h Range):  Temp:  [97.6 °F (36.4 °C)-98.9 °F (37.2 °C)] 97.9 °F (36.6 °C)  Pulse:  [59-69] 61  Resp:  [17-31] 27  SpO2:  [96 %-100 %] 97 %  BP: ()/(22-57) 129/43     Weight: 93.4 kg (205 lb 14.6 oz)  Body mass index is 35.34 kg/m².      Intake/Output Summary (Last 24 hours) at 04/18/18 1152  Last data filed at 04/18/18 0900   Gross per 24 hour   Intake              550 ml   Output             1020 ml   Net             -470 ml       Physical Exam   Constitutional: She appears well-developed and well-nourished.   HENT:   Head: Atraumatic.   Eyes: EOM are normal.   Neck: Neck supple.   Cardiovascular: Normal rate.    Pulmonary/Chest: Effort normal. No respiratory distress. She has no wheezes. She has rales. She exhibits no  tenderness.   Abdominal: Soft. There is no tenderness.   Genitourinary:   Genitourinary Comments: Jama in place   Musculoskeletal: She exhibits edema.   Improving lower extremity edema.   Neurological: She is alert.   Oriented to place.   Skin: Skin is warm. Rash noted.   Bruises over upper and lower extremities.   Psychiatric:   Not agitated       Vents:       Lines/Drains/Airways     Drain                 Urethral Catheter 04/17/18 0352 Latex 16 Fr. 1 day          Peripheral Intravenous Line                 Peripheral IV - Single Lumen 04/17/18 0323 Right Antecubital 1 day                Significant Labs:    CBC/Anemia Profile:    Recent Labs  Lab 04/17/18 0320 04/17/18  0903 04/18/18  0550   WBC 9.77 8.47 8.91   HGB 11.2* 9.2* 9.3*   HCT 32.6* 27.1* 27.7*    171 188   * 106* 106*   RDW 14.8* 14.8* 14.9*        Chemistries:    Recent Labs  Lab 04/17/18 0320 04/17/18  0903 04/18/18  0550   * 134* 136   K 4.4 3.9 3.8   CL 97 99 100   CO2 20* 23 24   BUN 48* 51* 59*   CREATININE 2.1* 2.0* 2.1*   CALCIUM 9.2 8.4* 8.8   ALBUMIN 3.3* 2.6* 2.5*   PROT 7.4  --   --    BILITOT 1.5*  --   --    ALKPHOS 93  --   --    ALT 24  --   --    AST 37  --   --    MG 1.6 1.7 1.7   PHOS 4.2 4.9*  4.9* 4.2  4.2     Echo :      1 - Severe left atrial enlargement.     2 - Concentric hypertrophy.     3 - No wall motion abnormalities.     4 - Low normal to mildly depressed left ventricular systolic function (EF 50-55%).     5 - Impaired LV relaxation, elevated LAP (grade 2 diastolic dysfunction).     6 - Low normal to mildly depressed right ventricular systolic function .     7 - Pulmonary hypertension. The estimated PA systolic pressure is 83 mmHg.     8 - Low flow, low gradient aortic valve stenosis with preserved EF ((ELIZABETH 0.61 cm2, AVAi 0.31 cm2/m2, peak aortic jet velocity 4.0 m/s,MG 37 mmHg, EF 50%,SVi 30 mL/m2).     9 - Mild aortic regurgitation.     10 - Mild to moderate mitral regurgitation.     11 - Mild  to moderate tricuspid regurgitation.     12 - Intermediate central venous pressure.     13 - Right pleural effusion.   Significant Imaging:    CXR: I have reviewed all pertinent results/findings within the past 24 hours and my personal findings are:  Stable bilateral multifocal infiltrates.      Assessment/Plan:     * Acute on chronic diastolic congestive heart failure    Strict I's and O's  IV Lasix 80 mg to 12 hours.  Check repeat 2-D echo  4/18 2-D echo reviewed.  Continue diuresis.        Acute respiratory failure    O2 keep sat above 90%.  Use BiPAP when necessary for distress.  4/18 wean O2 as tolerated keeps it above 90%.  Please note patient does not have oxygen at home.  BiPAP if necessary.        CAP (community acquired pneumonia)    Continue IV Rocephin and azithromycin.  Follow on blood culture and urine culture.  Repeat chest x-ray in a.m.  4/18 cultures negative.  Continue Rocephin and azithromycin.  Patient afebrile today.          Transfer patient to telemetry.     Jimbo Cheek MD  Pulmonology  [facility] -

## 2018-04-18 NOTE — HOSPITAL COURSE
Admitted for treatment of heart failure exacerbation and pneumonia.  Empirically started Ceftriaxone and Azithromycin.  Also treated with IV Furosemide.  Diuresis improved shortness of breath.  PT/OT evaluation and case management conference with her daughter about care goals.  Overall improvement but continued cough.  Transfer tot Telemetry 18 April.  Requesting placement with skilled nursing.  Discharge to [facility].

## 2018-04-18 NOTE — ASSESSMENT & PLAN NOTE
-Peaked at 0.241, now trending down  -Elevated but flat, secondary to demand ischemia from pneumonia, volume overload, and underlying renal failure  -Continue home medications-ASA, BB, Zetia  -2D echo showed EF of 50-55%, DD, severe AS, pulmonary HTN  -Would benefit from stress test

## 2018-04-18 NOTE — SUBJECTIVE & OBJECTIVE
Interval History:  Improvement in shortness of breath but continues to have non-productive cough.  No acute problems or complaints.  Tolerating diet.    Review of Systems   Unable to perform ROS: Other   Constitutional: Negative for chills and fever.   HENT: Negative for congestion and sore throat.    Eyes: Negative for visual disturbance.   Respiratory: Positive for cough and shortness of breath. Negative for wheezing.    Cardiovascular: Negative for chest pain, palpitations and leg swelling.   Gastrointestinal: Negative for abdominal pain, blood in stool, constipation, diarrhea, nausea and vomiting.   Genitourinary: Negative for dysuria and hematuria.   Musculoskeletal: Negative for arthralgias and back pain.   Skin: Negative for rash and wound.   Neurological: Negative for dizziness, weakness, light-headedness and numbness.   Hematological: Negative for adenopathy.     Objective:     Vital Signs (Most Recent):  Temp: 97.9 °F (36.6 °C) (04/18/18 0800)  Pulse: 60 (04/18/18 0900)  Resp: (!) 29 (04/18/18 0900)  BP: (!) 115/33 (04/18/18 0900)  SpO2: 97 % (04/18/18 0900) Vital Signs (24h Range):  Temp:  [97.6 °F (36.4 °C)-98.9 °F (37.2 °C)] 97.9 °F (36.6 °C)  Pulse:  [59-69] 60  Resp:  [17-31] 29  SpO2:  [96 %-100 %] 97 %  BP: ()/(25-57) 115/33     Weight: 93.4 kg (205 lb 14.6 oz)  Body mass index is 35.34 kg/m².    Intake/Output Summary (Last 24 hours) at 04/18/18 1015  Last data filed at 04/18/18 0600   Gross per 24 hour   Intake              550 ml   Output              885 ml   Net             -335 ml      Physical Exam   Constitutional: She is oriented to person, place, and time. She appears well-developed and well-nourished. No distress.   HENT:   Head: Normocephalic and atraumatic.   Mouth/Throat: Oropharynx is clear and moist.   Very hard of hearing.   Eyes: Conjunctivae and EOM are normal. Pupils are equal, round, and reactive to light.   Neck: Neck supple. No JVD present. No thyromegaly present.    Cardiovascular: Normal rate and regular rhythm.  Exam reveals no gallop and no friction rub.    No murmur heard.  Pulmonary/Chest: Effort normal and breath sounds normal. She has no wheezes. She has no rales.   Mild crackles bilaterally.   Abdominal: Soft. Bowel sounds are normal. She exhibits no distension. There is no tenderness. There is no rebound and no guarding.   Musculoskeletal: Normal range of motion. She exhibits no edema or deformity.   Lymphadenopathy:     She has no cervical adenopathy.   Neurological: She is alert and oriented to person, place, and time. She has normal reflexes. No sensory deficit.   Skin: Skin is warm and dry. No rash noted.   Psychiatric: She has a normal mood and affect. Her behavior is normal. Judgment and thought content normal.   Nursing note and vitals reviewed.      Significant Labs: All pertinent labs within the past 24 hours have been reviewed.    Significant Imaging: I have reviewed all pertinent imaging results/findings within the past 24 hours.

## 2018-04-18 NOTE — PLAN OF CARE
Problem: Patient Care Overview  Goal: Plan of Care Review  PT REQUIRES MOD A FOR T/F TO CHAIR WITH RW    Outcome: Ongoing (interventions implemented as appropriate)  Patient had an uneventful night. V/S stable. A-febrile.  No complaints of pain. Remains on 2L nasal canula.  Patient becomes SOB on exertion.  Tolerating diet.  Adequate UOP noted to Jama catheter.  Repositioning patient Q2H to prevent skin breakdown.  IV abx administered as ordered.  Plan of care discussed with patient and family.

## 2018-04-18 NOTE — ASSESSMENT & PLAN NOTE
O2 keep sat above 90%.  Use BiPAP when necessary for distress.  4/18 wean O2 as tolerated keeps it above 90%.  Please note patient does not have oxygen at home.  BiPAP if necessary.

## 2018-04-18 NOTE — PROGRESS NOTES
[facility] Hospital Medicine  Progress Note    Patient Name: Diana Turner  MRN: [id number]  Patient Class: IP- Inpatient   Admission Date: 4/17/2018  Length of Stay: 0 days  Attending Physician: Tam Ferrer MD  Primary Care Provider: Cierra Woods DO        Subjective:     Principal Problem:Acute on chronic diastolic congestive heart failure    HPI:  84F h/o HFpEF, HTN, SSS s/p PPM, presents with SOB x 1 week.  Was transported by EMS to ER .   Per EMS, patient SpO2 on scene was 84% with RR 45.  Patient was placed on CPAP on scene.  Associated with ZELAYA, PND and orthopnea.  No other exacerbating or alleviating factors.  Patient had not been compliant on  her medications for the last week.  She saw cardiology (veronica) yesterday who advised patient to be compliant on medications, especially lasix 40mg PO BID.   Patient was placed on Bipap FiO2 100% with sat 97% in Er.  Tmax 101F.   CXR show patchy bilateral infiltrates, cardiomegaly.  BNP>3000 trop 0.345.   Lasix 40mg IV x 1 given in Er.    Blood cx x 2 and Urine cx collected.   Given CTX 2g x 1. Hospital medicine called for admission.         Hospital Course:  Admitted for treatment of heart failure exacerbation and pneumonia.    Interval History:  Improvement in shortness of breath.    Review of Systems   Unable to perform ROS: Other   Constitutional: Negative for chills and fever.   HENT: Negative for congestion and sore throat.    Eyes: Negative for visual disturbance.   Respiratory: Positive for shortness of breath. Negative for cough and wheezing.    Cardiovascular: Negative for chest pain, palpitations and leg swelling.   Gastrointestinal: Negative for abdominal pain, blood in stool, constipation, diarrhea, nausea and vomiting.   Genitourinary: Negative for dysuria and hematuria.   Musculoskeletal: Negative for arthralgias and back pain.   Skin: Negative for rash and wound.   Neurological: Negative for dizziness, weakness,  light-headedness and numbness.   Hematological: Negative for adenopathy.     Objective:     Vital Signs (Most Recent):  Temp: 97.8 °F (36.6 °C) (04/17/18 1902)  Pulse: 60 (04/17/18 2200)  Resp: (!) 25 (04/17/18 2200)  BP: (!) 104/57 (04/17/18 2200)  SpO2: 100 % (04/17/18 2200) Vital Signs (24h Range):  Temp:  [97.8 °F (36.6 °C)-101 °F (38.3 °C)] 97.8 °F (36.6 °C)  Pulse:  [59-91] 60  Resp:  [18-49] 25  SpO2:  [96 %-100 %] 100 %  BP: ()/(22-70) 104/57     Weight: 90 kg (198 lb 6.6 oz)  Body mass index is 34.06 kg/m².    Intake/Output Summary (Last 24 hours) at 04/17/18 2230  Last data filed at 04/17/18 2200   Gross per 24 hour   Intake              600 ml   Output              720 ml   Net             -120 ml      Physical Exam   Constitutional: She is oriented to person, place, and time. She appears well-developed and well-nourished. No distress.   HENT:   Head: Normocephalic and atraumatic.   Mouth/Throat: Oropharynx is clear and moist.   Very hard of hearing.   Eyes: Conjunctivae and EOM are normal. Pupils are equal, round, and reactive to light.   Neck: Neck supple. No JVD present. No thyromegaly present.   Cardiovascular: Normal rate and regular rhythm.  Exam reveals no gallop and no friction rub.    No murmur heard.  Pulmonary/Chest: Effort normal and breath sounds normal. She has no wheezes. She has no rales.   Mild crackles bilaterally.   Abdominal: Soft. Bowel sounds are normal. She exhibits no distension. There is no tenderness. There is no rebound and no guarding.   Musculoskeletal: Normal range of motion. She exhibits no edema or deformity.   Lymphadenopathy:     She has no cervical adenopathy.   Neurological: She is alert and oriented to person, place, and time. She has normal reflexes. No sensory deficit.   Skin: Skin is warm and dry. No rash noted.   Psychiatric: She has a normal mood and affect. Her behavior is normal. Judgment and thought content normal.   Nursing note and vitals  reviewed.      Significant Labs: All pertinent labs within the past 24 hours have been reviewed.    Significant Imaging: I have reviewed all pertinent imaging results/findings within the past 24 hours.    Assessment/Plan:      * Acute on chronic diastolic congestive heart failure    - only given 1 dose IV 40mg lasix in ER  - BNP>3000, with significant SOB  - exacerbation 2/2 noncompliance with last visit in cardiology clinic yesterday (veronica)    - Give lasix 80mg IV BID, first dose now  - strict I/O, daily weights  - Fluid restriction 1L            SA node dysfunction    S/p PPM  HR controlled  Continue atenolol, diltiazem          CAP (community acquired pneumonia)    - R side patchy infiltrate on CXR    - Continue CTX, Add azithromycin  - follow up cx          Acute on chronic renal failure    - Prerenal 2/2 chf exacerbation  - monitor I/O          Elevated troponin    - 2/2 work/demand ischemia from CHF exacerbation  - EKG showing AV paced rhythm, ST abnormalities appears to have J point elevation in precordial leads    - Repeat EKG  - Consult cardiology  - trend troponin x 6            Benign hypertensive heart disease with heart failure    - controlled  - continue atenolol, diltiazem            VTE Risk Mitigation         Ordered     heparin (porcine) injection 5,000 Units  Every 8 hours     Route:  Subcutaneous        04/17/18 0447     IP VTE HIGH RISK PATIENT  Once      04/17/18 0447     Place sequential compression device  Until discontinued      04/17/18 0442          Critical care time spent on the evaluation and treatment of severe organ dysfunction, review of pertinent labs and imaging studies, discussions with consulting providers and discussions with patient/family: 30 minutes.    Tam Ferrer MD  Department of Hospital Medicine   [facility]

## 2018-04-18 NOTE — PT/OT/SLP PROGRESS
Physical Therapy      Patient Name:  Diana Turner   MRN:  [id number]    PT AGREES TO THERAPY, WORKED ON SLIDING LEGS OVER TO SIDE OF BED, AND AS PT WAS ABOUT TO SIT UP,  NOTED LARGE AMOUNT OF WATERY STOOL,  AND PT WAS REPOSITIONED IN BED FOR CLEAN UP, BY NURSING.  JENARO Benson, PT                               04/18/18                     [id number]

## 2018-04-18 NOTE — HOSPITAL COURSE
4/18/18-Patient seen and examined today. Feels better, SOB improved. No chest pain. Labs reviewed. Troponin peaked at .241, now trending down. Creatinine 2.1. 2D echo reviewed and showed EF of 50-55%, DD, severe AS, pulmonary HTN.    4/19/18-Patient seen and examined today. Complains of feeling weak and tired. Reports poor appetite. No chest pain. SOB improving but audible wheezes noted. Labs reviewed. Creatinine 1.8.    4/20/2018 feels sleepy and tired. Wheezes still noted.

## 2018-04-18 NOTE — SUBJECTIVE & OBJECTIVE
Review of Systems   Constitution: Positive for weakness and malaise/fatigue.   HENT: Negative.    Eyes: Negative.    Cardiovascular: Positive for dyspnea on exertion and leg swelling.   Respiratory: Positive for shortness of breath.    Endocrine: Negative.    Hematologic/Lymphatic: Negative.    Skin: Negative.    Musculoskeletal: Negative.    Gastrointestinal: Negative.    Genitourinary: Negative.    Psychiatric/Behavioral: Negative.    Allergic/Immunologic: Negative.      Objective:     Vital Signs (Most Recent):  Temp: 97.9 °F (36.6 °C) (04/18/18 0800)  Pulse: 61 (04/18/18 1030)  Resp: (!) 27 (04/18/18 1030)  BP: (!) 129/43 (04/18/18 1030)  SpO2: 97 % (04/18/18 1030) Vital Signs (24h Range):  Temp:  [97.6 °F (36.4 °C)-98.9 °F (37.2 °C)] 97.9 °F (36.6 °C)  Pulse:  [59-69] 61  Resp:  [17-31] 27  SpO2:  [96 %-100 %] 97 %  BP: ()/(22-57) 129/43     Weight: 93.4 kg (205 lb 14.6 oz)  Body mass index is 35.34 kg/m².     SpO2: 97 %  O2 Device (Oxygen Therapy): nasal cannula      Intake/Output Summary (Last 24 hours) at 04/18/18 1319  Last data filed at 04/18/18 1200   Gross per 24 hour   Intake             1000 ml   Output             1150 ml   Net             -150 ml       Lines/Drains/Airways     Drain                 Urethral Catheter 04/17/18 0352 Latex 16 Fr. 1 day          Peripheral Intravenous Line                 Peripheral IV - Single Lumen 04/17/18 0323 Right Antecubital 1 day                Physical Exam   Constitutional: She is oriented to person, place, and time. She appears well-developed and well-nourished. No distress.   HENT:   Head: Normocephalic and atraumatic.   Eyes: Pupils are equal, round, and reactive to light. Right eye exhibits no discharge. Left eye exhibits no discharge.   Neck: Neck supple. JVD present.   Cardiovascular: Normal rate, regular rhythm, S1 normal and S2 normal.    Murmur heard.   Harsh midsystolic murmur is present  at the upper right sternal border radiating to the  neck  Pulmonary/Chest: Effort normal. No respiratory distress. She has no wheezes. She has no rales.   Diminished BS right base  Rales  PPM site well-healed   Abdominal: Soft. She exhibits no distension. There is no tenderness. There is no rebound.   Musculoskeletal: She exhibits edema (BLE (improved)).   Neurological: She is alert and oriented to person, place, and time.   Skin: Skin is warm and dry. She is not diaphoretic. No erythema.   Psychiatric: She has a normal mood and affect. Her behavior is normal.   Nursing note and vitals reviewed.      Significant Labs:   CMP   Recent Labs  Lab 04/17/18  0320 04/17/18  0903 04/18/18  0550   * 134* 136   K 4.4 3.9 3.8   CL 97 99 100   CO2 20* 23 24   * 146* 104   BUN 48* 51* 59*   CREATININE 2.1* 2.0* 2.1*   CALCIUM 9.2 8.4* 8.8   PROT 7.4  --   --    ALBUMIN 3.3* 2.6* 2.5*   BILITOT 1.5*  --   --    ALKPHOS 93  --   --    AST 37  --   --    ALT 24  --   --    ANIONGAP 15 12 12   ESTGFRAFRICA 24* 26* 24*   EGFRNONAA 21* 22* 21*   , CBC   Recent Labs  Lab 04/17/18  0320 04/17/18  0903 04/18/18  0550   WBC 9.77 8.47 8.91   HGB 11.2* 9.2* 9.3*   HCT 32.6* 27.1* 27.7*    171 188   , Troponin   Recent Labs  Lab 04/18/18  0022 04/18/18  0550 04/18/18  1159   TROPONINI 0.228* 0.241* 0.185*    and All pertinent lab results from the last 24 hours have been reviewed.    Significant Imaging: Echocardiogram:   2D echo with color flow doppler:   Results for orders placed or performed during the hospital encounter of 04/17/18   2D echo with color flow doppler   Result Value Ref Range    EF 50 55 - 65    Mitral Valve Regurgitation MILD TO MODERATE     Diastolic Dysfunction Yes (A)     Aortic Valve Regurgitation MILD     Aortic Valve Stenosis SEVERE (A)     Est. PA Systolic Pressure 83 (A)     Tricuspid Valve Regurgitation MILD TO MODERATE    , EKG: Reviewed and X-Ray: CXR: X-Ray Chest 1 View (CXR):   Results for orders placed or performed during the hospital  encounter of 04/17/18   X-Ray Chest 1 View    Narrative    History: Respiratory failure    Comparison: 4/17/18    Result: Single view of the chest.       In comparison to the prior study, there is no adverse interval changes.    Impression     No adverse interval change      Electronically signed by: ELROY GOLDSMITH MD  Date:     04/18/18  Time:    08:07     and X-Ray Chest PA and Lateral (CXR): No results found for this visit on 04/17/18.

## 2018-04-18 NOTE — PLAN OF CARE
Problem: Physical Therapy Goal  Goal: Physical Therapy Goal  PT WILL BE SEEN FOR P.T. FOR A MIN OF 5 OUT OF 7 DAYS A WEEK  LT18  1. PT WILL COMPLETE BED MOBILITY WITH MIN A  2. PT WILL T/F TO CHAIR WITH MIN A  3. PT WILL GT TRAIN X 50' WITH RW AND MIN A  4. PT WILL  COMPLETE B LE TE X 20 REPS FOR STRENGTHENING   Outcome: Ongoing (interventions implemented as appropriate)  UNABLE TO SIT UP AT BEDSIDE SEC PT SOILED

## 2018-04-18 NOTE — SUBJECTIVE & OBJECTIVE
Interval History:  Improvement in shortness of breath.    Review of Systems   Unable to perform ROS: Other   Constitutional: Negative for chills and fever.   HENT: Negative for congestion and sore throat.    Eyes: Negative for visual disturbance.   Respiratory: Positive for shortness of breath. Negative for cough and wheezing.    Cardiovascular: Negative for chest pain, palpitations and leg swelling.   Gastrointestinal: Negative for abdominal pain, blood in stool, constipation, diarrhea, nausea and vomiting.   Genitourinary: Negative for dysuria and hematuria.   Musculoskeletal: Negative for arthralgias and back pain.   Skin: Negative for rash and wound.   Neurological: Negative for dizziness, weakness, light-headedness and numbness.   Hematological: Negative for adenopathy.     Objective:     Vital Signs (Most Recent):  Temp: 97.8 °F (36.6 °C) (04/17/18 1902)  Pulse: 60 (04/17/18 2200)  Resp: (!) 25 (04/17/18 2200)  BP: (!) 104/57 (04/17/18 2200)  SpO2: 100 % (04/17/18 2200) Vital Signs (24h Range):  Temp:  [97.8 °F (36.6 °C)-101 °F (38.3 °C)] 97.8 °F (36.6 °C)  Pulse:  [59-91] 60  Resp:  [18-49] 25  SpO2:  [96 %-100 %] 100 %  BP: ()/(22-70) 104/57     Weight: 90 kg (198 lb 6.6 oz)  Body mass index is 34.06 kg/m².    Intake/Output Summary (Last 24 hours) at 04/17/18 2230  Last data filed at 04/17/18 2200   Gross per 24 hour   Intake              600 ml   Output              720 ml   Net             -120 ml      Physical Exam   Constitutional: She is oriented to person, place, and time. She appears well-developed and well-nourished. No distress.   HENT:   Head: Normocephalic and atraumatic.   Mouth/Throat: Oropharynx is clear and moist.   Very hard of hearing.   Eyes: Conjunctivae and EOM are normal. Pupils are equal, round, and reactive to light.   Neck: Neck supple. No JVD present. No thyromegaly present.   Cardiovascular: Normal rate and regular rhythm.  Exam reveals no gallop and no friction rub.    No  murmur heard.  Pulmonary/Chest: Effort normal and breath sounds normal. She has no wheezes. She has no rales.   Mild crackles bilaterally.   Abdominal: Soft. Bowel sounds are normal. She exhibits no distension. There is no tenderness. There is no rebound and no guarding.   Musculoskeletal: Normal range of motion. She exhibits no edema or deformity.   Lymphadenopathy:     She has no cervical adenopathy.   Neurological: She is alert and oriented to person, place, and time. She has normal reflexes. No sensory deficit.   Skin: Skin is warm and dry. No rash noted.   Psychiatric: She has a normal mood and affect. Her behavior is normal. Judgment and thought content normal.   Nursing note and vitals reviewed.      Significant Labs: All pertinent labs within the past 24 hours have been reviewed.    Significant Imaging: I have reviewed all pertinent imaging results/findings within the past 24 hours.

## 2018-04-18 NOTE — PROGRESS NOTES
[facility] Hospital Medicine  Progress Note    Patient Name: Diana Turner  MRN: [id number]  Patient Class: IP- Inpatient   Admission Date: 4/17/2018  Length of Stay: 1 days  Attending Physician: Tam Ferrer MD  Primary Care Provider: Cierra Woods DO        Subjective:     Principal Problem:Acute on chronic diastolic congestive heart failure    HPI:  84F h/o HFpEF, HTN, SSS s/p PPM, presents with SOB x 1 week.  Was transported by EMS to ER .   Per EMS, patient SpO2 on scene was 84% with RR 45.  Patient was placed on CPAP on scene.  Associated with ZELAYA, PND and orthopnea.  No other exacerbating or alleviating factors.  Patient had not been compliant on  her medications for the last week.  She saw cardiology (veronica) yesterday who advised patient to be compliant on medications, especially lasix 40mg PO BID.   Patient was placed on Bipap FiO2 100% with sat 97% in Er.  Tmax 101F.   CXR show patchy bilateral infiltrates, cardiomegaly.  BNP>3000 trop 0.345.   Lasix 40mg IV x 1 given in Er.    Blood cx x 2 and Urine cx collected.   Given CTX 2g x 1. Hospital medicine called for admission.         Hospital Course:  Admitted for treatment of heart failure exacerbation and pneumonia.  Empirically started Ceftriaxone and Azithromycin.  Also treated with IV Furosemide.  Diuresis improved shortness of breath.  PT/OT evaluation and case management conference with her daughter about care goals.  Overall improvement but continued cough.  Transfer tot Telemetry 18 April.  Requesting placement with skilled nursing.    Interval History:  Improvement in shortness of breath but continues to have non-productive cough.  No acute problems or complaints.  Tolerating diet.    Review of Systems   Unable to perform ROS: Other   Constitutional: Negative for chills and fever.   HENT: Negative for congestion and sore throat.    Eyes: Negative for visual disturbance.   Respiratory: Positive for cough and shortness of  breath. Negative for wheezing.    Cardiovascular: Negative for chest pain, palpitations and leg swelling.   Gastrointestinal: Negative for abdominal pain, blood in stool, constipation, diarrhea, nausea and vomiting.   Genitourinary: Negative for dysuria and hematuria.   Musculoskeletal: Negative for arthralgias and back pain.   Skin: Negative for rash and wound.   Neurological: Negative for dizziness, weakness, light-headedness and numbness.   Hematological: Negative for adenopathy.     Objective:     Vital Signs (Most Recent):  Temp: 97.9 °F (36.6 °C) (04/18/18 0800)  Pulse: 60 (04/18/18 0900)  Resp: (!) 29 (04/18/18 0900)  BP: (!) 115/33 (04/18/18 0900)  SpO2: 97 % (04/18/18 0900) Vital Signs (24h Range):  Temp:  [97.6 °F (36.4 °C)-98.9 °F (37.2 °C)] 97.9 °F (36.6 °C)  Pulse:  [59-69] 60  Resp:  [17-31] 29  SpO2:  [96 %-100 %] 97 %  BP: ()/(25-57) 115/33     Weight: 93.4 kg (205 lb 14.6 oz)  Body mass index is 35.34 kg/m².    Intake/Output Summary (Last 24 hours) at 04/18/18 1015  Last data filed at 04/18/18 0600   Gross per 24 hour   Intake              550 ml   Output              885 ml   Net             -335 ml      Physical Exam   Constitutional: She is oriented to person, place, and time. She appears well-developed and well-nourished. No distress.   HENT:   Head: Normocephalic and atraumatic.   Mouth/Throat: Oropharynx is clear and moist.   Very hard of hearing.   Eyes: Conjunctivae and EOM are normal. Pupils are equal, round, and reactive to light.   Neck: Neck supple. No JVD present. No thyromegaly present.   Cardiovascular: Normal rate and regular rhythm.  Exam reveals no gallop and no friction rub.    No murmur heard.  Pulmonary/Chest: Effort normal and breath sounds normal. She has no wheezes. She has no rales.   Mild crackles bilaterally.   Abdominal: Soft. Bowel sounds are normal. She exhibits no distension. There is no tenderness. There is no rebound and no guarding.   Musculoskeletal: Normal  range of motion. She exhibits no edema or deformity.   Lymphadenopathy:     She has no cervical adenopathy.   Neurological: She is alert and oriented to person, place, and time. She has normal reflexes. No sensory deficit.   Skin: Skin is warm and dry. No rash noted.   Psychiatric: She has a normal mood and affect. Her behavior is normal. Judgment and thought content normal.   Nursing note and vitals reviewed.      Significant Labs: All pertinent labs within the past 24 hours have been reviewed.    Significant Imaging: I have reviewed all pertinent imaging results/findings within the past 24 hours.    Assessment/Plan:      * Acute on chronic diastolic congestive heart failure    - only given 1 dose IV 40mg lasix in ER  - BNP>3000, with significant SOB  - exacerbation 2/2 noncompliance with last visit in cardiology clinic yesterday (veronica)    - Give lasix 80mg IV BID, first dose now  - strict I/O, daily weights  - Fluid restriction 1L        SA node dysfunction    S/p PPM  HR controlled  Continue atenolol, diltiazem          CAP (community acquired pneumonia)    - R side patchy infiltrate on CXR    - Continue CTX, Add azithromycin  - follow up cx          Acute on chronic renal failure    - Prerenal 2/2 chf exacerbation  - monitor I/O          Elevated troponin    - 2/2 work/demand ischemia from CHF exacerbation  - EKG showing AV paced rhythm, ST abnormalities appears to have J point elevation in precordial leads    - Repeat EKG  - Consult cardiology  - trend troponin x 6            Benign hypertensive heart disease with heart failure    - controlled  - continue atenolol, diltiazem            VTE Risk Mitigation         Ordered     heparin (porcine) injection 5,000 Units  Every 8 hours      04/17/18 0447     IP VTE HIGH RISK PATIENT  Once      04/17/18 0447     Place sequential compression device  Until discontinued      04/17/18 0442              Tam Ferrer MD  Department of Hospital Medicine   [facility]

## 2018-04-18 NOTE — PROGRESS NOTES
[facility]  Cardiology  Progress Note    Patient Name: Diana Turner  MRN: [id number]  Admission Date: 4/17/2018  Hospital Length of Stay: 1 days  Code Status: Full Code   Attending Physician: Tam Ferrer MD   Primary Care Physician: Cierra Woods DO  Expected Discharge Date:   Principal Problem:Acute on chronic diastolic congestive heart failure    Subjective:   HPI:   Ms. Turner is an 84 year old female patient with a PMHx of SSS s/p PPM, AI/AS, diastolic CHF, HTN, hyperlipidemia, and carotid artery disease who presented to [facility] ED yesterday with a chief complaint of progressively worsening SOB over the past week. Associated symptoms included leg swelling, orthopnea, and PND. Patient denied any associated chest pain, palpitations, near syncope, or syncope. EMS reported O2 sat of 84% upon arrival to home. Initial workup in ED revealed +fever (101), creatinine of 2.1, troponin of 0.375, +procalcitonin (1.58), lactic acidosis (2.4), and BNP of 3,526. CXR showed right lung field infiltrate as well as vascular congestion and patient subsequently admitted for further evaluation and treatment. Cardiology consulted to assist with management. Patient seen and examined today, sitting up in bed. HPI limited due to dementia. No real complaints. States she is feeling better. Admits she forgot her meds over the past week. Chart reviewed. Troponin elevated but flat 0.375>0.385. BC pending. 2D echo pending.       Hospital Course:   4/18/18-Patient seen and examined today. Feels better, SOB improved. No chest pain. Labs reviewed. Troponin peaked at .241, now trending down. Creatinine 2.1. 2D echo reviewed and showed EF of 50-55%, DD, severe AS, pulmonary HTN.        Review of Systems   Constitution: Positive for weakness and malaise/fatigue.   HENT: Negative.    Eyes: Negative.    Cardiovascular: Positive for dyspnea on exertion and leg swelling.   Respiratory: Positive for shortness of breath.     Endocrine: Negative.    Hematologic/Lymphatic: Negative.    Skin: Negative.    Musculoskeletal: Negative.    Gastrointestinal: Negative.    Genitourinary: Negative.    Psychiatric/Behavioral: Negative.    Allergic/Immunologic: Negative.      Objective:     Vital Signs (Most Recent):  Temp: 97.9 °F (36.6 °C) (04/18/18 0800)  Pulse: 61 (04/18/18 1030)  Resp: (!) 27 (04/18/18 1030)  BP: (!) 129/43 (04/18/18 1030)  SpO2: 97 % (04/18/18 1030) Vital Signs (24h Range):  Temp:  [97.6 °F (36.4 °C)-98.9 °F (37.2 °C)] 97.9 °F (36.6 °C)  Pulse:  [59-69] 61  Resp:  [17-31] 27  SpO2:  [96 %-100 %] 97 %  BP: ()/(22-57) 129/43     Weight: 93.4 kg (205 lb 14.6 oz)  Body mass index is 35.34 kg/m².     SpO2: 97 %  O2 Device (Oxygen Therapy): nasal cannula      Intake/Output Summary (Last 24 hours) at 04/18/18 1319  Last data filed at 04/18/18 1200   Gross per 24 hour   Intake             1000 ml   Output             1150 ml   Net             -150 ml       Lines/Drains/Airways     Drain                 Urethral Catheter 04/17/18 0352 Latex 16 Fr. 1 day          Peripheral Intravenous Line                 Peripheral IV - Single Lumen 04/17/18 0323 Right Antecubital 1 day                Physical Exam   Constitutional: She is oriented to person, place, and time. She appears well-developed and well-nourished. No distress.   HENT:   Head: Normocephalic and atraumatic.   Eyes: Pupils are equal, round, and reactive to light. Right eye exhibits no discharge. Left eye exhibits no discharge.   Neck: Neck supple. JVD present.   Cardiovascular: Normal rate, regular rhythm, S1 normal and S2 normal.    Murmur heard.   Harsh midsystolic murmur is present  at the upper right sternal border radiating to the neck  Pulmonary/Chest: Effort normal. No respiratory distress. She has no wheezes. She has no rales.   Diminished BS right base  Rales  PPM site well-healed   Abdominal: Soft. She exhibits no distension. There is no tenderness. There is no  rebound.   Musculoskeletal: She exhibits edema (BLE (improved)).   Neurological: She is alert and oriented to person, place, and time.   Skin: Skin is warm and dry. She is not diaphoretic. No erythema.   Psychiatric: She has a normal mood and affect. Her behavior is normal.   Nursing note and vitals reviewed.      Significant Labs:   CMP   Recent Labs  Lab 04/17/18  0320 04/17/18  0903 04/18/18  0550   * 134* 136   K 4.4 3.9 3.8   CL 97 99 100   CO2 20* 23 24   * 146* 104   BUN 48* 51* 59*   CREATININE 2.1* 2.0* 2.1*   CALCIUM 9.2 8.4* 8.8   PROT 7.4  --   --    ALBUMIN 3.3* 2.6* 2.5*   BILITOT 1.5*  --   --    ALKPHOS 93  --   --    AST 37  --   --    ALT 24  --   --    ANIONGAP 15 12 12   ESTGFRAFRICA 24* 26* 24*   EGFRNONAA 21* 22* 21*   , CBC   Recent Labs  Lab 04/17/18  0320 04/17/18  0903 04/18/18  0550   WBC 9.77 8.47 8.91   HGB 11.2* 9.2* 9.3*   HCT 32.6* 27.1* 27.7*    171 188   , Troponin   Recent Labs  Lab 04/18/18  0022 04/18/18  0550 04/18/18  1159   TROPONINI 0.228* 0.241* 0.185*    and All pertinent lab results from the last 24 hours have been reviewed.    Significant Imaging: Echocardiogram:   2D echo with color flow doppler:   Results for orders placed or performed during the hospital encounter of 04/17/18   2D echo with color flow doppler   Result Value Ref Range    EF 50 55 - 65    Mitral Valve Regurgitation MILD TO MODERATE     Diastolic Dysfunction Yes (A)     Aortic Valve Regurgitation MILD     Aortic Valve Stenosis SEVERE (A)     Est. PA Systolic Pressure 83 (A)     Tricuspid Valve Regurgitation MILD TO MODERATE    , EKG: Reviewed and X-Ray: CXR: X-Ray Chest 1 View (CXR):   Results for orders placed or performed during the hospital encounter of 04/17/18   X-Ray Chest 1 View    Narrative    History: Respiratory failure    Comparison: 4/17/18    Result: Single view of the chest.       In comparison to the prior study, there is no adverse interval changes.    Impression     No  adverse interval change      Electronically signed by: ELROY GOLDSMITH MD  Date:     04/18/18  Time:    08:07     and X-Ray Chest PA and Lateral (CXR): No results found for this visit on 04/17/18.    Assessment and Plan:   Patient who presents with decompensated diastolic CHF in setting of pneumonia, medication non-compliance, and severe AS. Improving, continue current mgmt. Consider stress test. TAVR workup as OP.    * Acute on chronic diastolic congestive heart failure    -Decompensated, secondary to pneumonia and medication non-compliance  -Severe AS also contributing   -Improving but still volume overloaded  -Continue IV diuresis  -Strict I's/O's  -Continue ASA, BB  -No ACEI/ARB given renal function        Severe aortic stenosis    -ELIZABETH 0.6 on 2D echo  -Will need TAVR workup as OP        CAP (community acquired pneumonia)    -Mgmt as per primary team; on abx        Acute on chronic renal failure    -Creatinine stable at 2.1        Elevated troponin    -Peaked at 0.241, now trending down  -Elevated but flat, secondary to demand ischemia from pneumonia, volume overload, and underlying renal failure  -Continue home medications-ASA, BB, Zetia  -2D echo showed EF of 50-55%, DD, severe AS, pulmonary HTN  -Would benefit from stress test        Benign hypertensive heart disease with heart failure    -Continue ASA, atenolol, Zetia, Cardizem            VTE Risk Mitigation         Ordered     heparin (porcine) injection 5,000 Units  Every 8 hours      04/17/18 0447     IP VTE HIGH RISK PATIENT  Once      04/17/18 0447     Place sequential compression device  Until discontinued      04/17/18 0442          Marium Mcclellan PA-C  Cardiology  [facility]     Chart reviewed. Dr. Browne examined patient and agrees with plan as outlined above.

## 2018-04-18 NOTE — ASSESSMENT & PLAN NOTE
-Decompensated, secondary to pneumonia and medication non-compliance  -Severe AS also contributing   -Improving but still volume overloaded  -Continue IV diuresis  -Strict I's/O's  -Continue ASA, BB  -No ACEI/ARB given renal function

## 2018-04-18 NOTE — ASSESSMENT & PLAN NOTE
Strict I's and O's  IV Lasix 80 mg to 12 hours.  Check repeat 2-D echo  4/18 2-D echo reviewed.  Continue diuresis.

## 2018-04-19 PROBLEM — E83.42 HYPOMAGNESEMIA: Status: ACTIVE | Noted: 2018-04-19

## 2018-04-19 LAB
ALBUMIN SERPL BCP-MCNC: 2.3 G/DL
ANION GAP SERPL CALC-SCNC: 14 MMOL/L
BASOPHILS # BLD AUTO: 0.02 K/UL
BASOPHILS NFR BLD: 0.2 %
BUN SERPL-MCNC: 58 MG/DL
CALCIUM SERPL-MCNC: 8.7 MG/DL
CHLORIDE SERPL-SCNC: 98 MMOL/L
CO2 SERPL-SCNC: 24 MMOL/L
CREAT SERPL-MCNC: 1.8 MG/DL
DIFFERENTIAL METHOD: ABNORMAL
EOSINOPHIL # BLD AUTO: 0.1 K/UL
EOSINOPHIL NFR BLD: 1.1 %
ERYTHROCYTE [DISTWIDTH] IN BLOOD BY AUTOMATED COUNT: 14.7 %
EST. GFR  (AFRICAN AMERICAN): 29 ML/MIN/1.73 M^2
EST. GFR  (NON AFRICAN AMERICAN): 25 ML/MIN/1.73 M^2
GLUCOSE SERPL-MCNC: 115 MG/DL
HCT VFR BLD AUTO: 26.8 %
HGB BLD-MCNC: 9.2 G/DL
LYMPHOCYTES # BLD AUTO: 0.8 K/UL
LYMPHOCYTES NFR BLD: 8.1 %
MAGNESIUM SERPL-MCNC: 1.4 MG/DL
MCH RBC QN AUTO: 35.9 PG
MCHC RBC AUTO-ENTMCNC: 34.3 G/DL
MCV RBC AUTO: 105 FL
MONOCYTES # BLD AUTO: 1 K/UL
MONOCYTES NFR BLD: 10.4 %
NEUTROPHILS # BLD AUTO: 7.6 K/UL
NEUTROPHILS NFR BLD: 80.2 %
PHOSPHATE SERPL-MCNC: 3.6 MG/DL
PHOSPHATE SERPL-MCNC: 3.6 MG/DL
PLATELET # BLD AUTO: 207 K/UL
PMV BLD AUTO: 10.3 FL
POTASSIUM SERPL-SCNC: 3.1 MMOL/L
RBC # BLD AUTO: 2.56 M/UL
SODIUM SERPL-SCNC: 136 MMOL/L
TROPONIN I SERPL DL<=0.01 NG/ML-MCNC: 0.11 NG/ML
TROPONIN I SERPL DL<=0.01 NG/ML-MCNC: 0.12 NG/ML
TROPONIN I SERPL DL<=0.01 NG/ML-MCNC: 0.14 NG/ML
TROPONIN I SERPL DL<=0.01 NG/ML-MCNC: 0.14 NG/ML
WBC # BLD AUTO: 9.45 K/UL

## 2018-04-19 PROCEDURE — 97110 THERAPEUTIC EXERCISES: CPT

## 2018-04-19 PROCEDURE — 36415 COLL VENOUS BLD VENIPUNCTURE: CPT

## 2018-04-19 PROCEDURE — 80069 RENAL FUNCTION PANEL: CPT

## 2018-04-19 PROCEDURE — 63600175 PHARM REV CODE 636 W HCPCS: Performed by: INTERNAL MEDICINE

## 2018-04-19 PROCEDURE — 85025 COMPLETE CBC W/AUTO DIFF WBC: CPT

## 2018-04-19 PROCEDURE — 84484 ASSAY OF TROPONIN QUANT: CPT | Mod: 91

## 2018-04-19 PROCEDURE — 21400001 HC TELEMETRY ROOM

## 2018-04-19 PROCEDURE — 63600175 PHARM REV CODE 636 W HCPCS: Performed by: HOSPITALIST

## 2018-04-19 PROCEDURE — 25000003 PHARM REV CODE 250: Performed by: INTERNAL MEDICINE

## 2018-04-19 PROCEDURE — 97530 THERAPEUTIC ACTIVITIES: CPT

## 2018-04-19 PROCEDURE — 25000003 PHARM REV CODE 250: Performed by: HOSPITALIST

## 2018-04-19 PROCEDURE — 99232 SBSQ HOSP IP/OBS MODERATE 35: CPT | Mod: ,,, | Performed by: INTERNAL MEDICINE

## 2018-04-19 PROCEDURE — 83735 ASSAY OF MAGNESIUM: CPT

## 2018-04-19 PROCEDURE — 27000221 HC OXYGEN, UP TO 24 HOURS

## 2018-04-19 RX ORDER — MAGNESIUM SULFATE 1 G/100ML
1 INJECTION INTRAVENOUS ONCE
Status: COMPLETED | OUTPATIENT
Start: 2018-04-19 | End: 2018-04-19

## 2018-04-19 RX ADMIN — FUROSEMIDE 80 MG: 10 INJECTION, SOLUTION INTRAMUSCULAR; INTRAVENOUS at 05:04

## 2018-04-19 RX ADMIN — CEFTRIAXONE SODIUM 1 G: 1 INJECTION, POWDER, FOR SOLUTION INTRAMUSCULAR; INTRAVENOUS at 04:04

## 2018-04-19 RX ADMIN — AZITHROMYCIN MONOHYDRATE 500 MG: 500 INJECTION, POWDER, LYOPHILIZED, FOR SOLUTION INTRAVENOUS at 06:04

## 2018-04-19 RX ADMIN — HEPARIN SODIUM 5000 UNITS: 5000 INJECTION, SOLUTION INTRAVENOUS; SUBCUTANEOUS at 06:04

## 2018-04-19 RX ADMIN — ATENOLOL 100 MG: 50 TABLET ORAL at 09:04

## 2018-04-19 RX ADMIN — DILTIAZEM HYDROCHLORIDE 240 MG: 120 CAPSULE, COATED, EXTENDED RELEASE ORAL at 09:04

## 2018-04-19 RX ADMIN — ASPIRIN 81 MG 81 MG: 81 TABLET ORAL at 09:04

## 2018-04-19 RX ADMIN — HEPARIN SODIUM 5000 UNITS: 5000 INJECTION, SOLUTION INTRAVENOUS; SUBCUTANEOUS at 09:04

## 2018-04-19 RX ADMIN — HEPARIN SODIUM 5000 UNITS: 5000 INJECTION, SOLUTION INTRAVENOUS; SUBCUTANEOUS at 02:04

## 2018-04-19 RX ADMIN — SENNOSIDES 1 TABLET: 8.6 TABLET, FILM COATED ORAL at 09:04

## 2018-04-19 RX ADMIN — GUAIFENESIN AND DEXTROMETHORPHAN HYDROBROMIDE 1 TABLET: 600; 30 TABLET, EXTENDED RELEASE ORAL at 09:04

## 2018-04-19 RX ADMIN — FUROSEMIDE 80 MG: 10 INJECTION, SOLUTION INTRAMUSCULAR; INTRAVENOUS at 09:04

## 2018-04-19 RX ADMIN — ALLOPURINOL 200 MG: 100 TABLET ORAL at 09:04

## 2018-04-19 RX ADMIN — EZETIMIBE 10 MG: 10 TABLET ORAL at 09:04

## 2018-04-19 RX ADMIN — MAGNESIUM SULFATE IN DEXTROSE 1 G: 10 INJECTION, SOLUTION INTRAVENOUS at 04:04

## 2018-04-19 NOTE — ASSESSMENT & PLAN NOTE
-Decompensated, secondary to pneumonia and medication non-compliance  -Severe AS also contributing   -Improved with IV diuresis  -Resume po meds tmw  -Continue ASA, BB  -No ACEI/ARB given renal function

## 2018-04-19 NOTE — PROGRESS NOTES
[facility]  Pulmonology  Progress Note    Patient Name: Diana Turner  MRN: [id number]  Admission Date: 4/17/2018  Hospital Length of Stay: 2 days  Code Status: Full Code  Attending Provider: Tam Ferrer MD  Primary Care Provider: Cierra Woods DO   Principal Problem: Acute on chronic diastolic congestive heart failure    Subjective:     Interval History:   Review of Systems   Constitutional: Negative for fever.        No fever today   HENT: Negative for nosebleeds.    Eyes: Negative for redness.   Respiratory: Positive for shortness of breath.    Cardiovascular: Positive for leg swelling. Negative for chest pain.   Gastrointestinal: Negative for abdominal pain.   Genitourinary: Negative for hematuria.   Musculoskeletal: Negative for falls.   Skin: Positive for rash.   Neurological: Negative for seizures and loss of consciousness.   Endo/Heme/Allergies: Bruises/bleeds easily.   Psychiatric/Behavioral: Negative for suicidal ideas.       Objective:     Vital Signs (Most Recent):  Temp: 98.4 °F (36.9 °C) (04/19/18 0758)  Pulse: 60 (04/19/18 1518)  Resp: 20 (04/19/18 1223)  BP: (!) 151/68 (04/19/18 1223)  SpO2: (!) 94 % (04/19/18 1223) Vital Signs (24h Range):  Temp:  [97.9 °F (36.6 °C)-98.5 °F (36.9 °C)] 98.4 °F (36.9 °C)  Pulse:  [60-66] 60  Resp:  [19-21] 20  SpO2:  [93 %-95 %] 94 %  BP: (140-151)/(61-68) 151/68     Weight: 90.4 kg (199 lb 4.7 oz)  Body mass index is 34.21 kg/m².      Intake/Output Summary (Last 24 hours) at 04/19/18 1547  Last data filed at 04/19/18 1452   Gross per 24 hour   Intake              780 ml   Output             3301 ml   Net            -2521 ml       Physical Exam   Constitutional: She appears well-developed and well-nourished.   HENT:   Head: Atraumatic.   Eyes: EOM are normal.   Neck: Neck supple.   Cardiovascular: Normal rate.    Pulmonary/Chest: Effort normal. No respiratory distress. She has no wheezes. She has rales. She exhibits no tenderness.   Abdominal:  Soft. There is no tenderness.   Genitourinary:   Genitourinary Comments: Jama in place   Musculoskeletal: She exhibits edema.   Improving lower extremity edema.   Neurological: She is alert.   Oriented to place.   Skin: Skin is warm. Rash noted.   Bruises over upper and lower extremities.   Psychiatric:   Not agitated       Vents:       Lines/Drains/Airways     Drain                 Urethral Catheter 04/17/18 0352 Latex 16 Fr. 2 days          Peripheral Intravenous Line                 Peripheral IV - Single Lumen 04/17/18 0323 Right Antecubital 2 days                Significant Labs:    CBC/Anemia Profile:    Recent Labs  Lab 04/18/18  0550 04/19/18  0609   WBC 8.91 9.45   HGB 9.3* 9.2*   HCT 27.7* 26.8*    207   * 105*   RDW 14.9* 14.7*        Chemistries:    Recent Labs  Lab 04/18/18  0550 04/19/18  0609    136   K 3.8 3.1*    98   CO2 24 24   BUN 59* 58*   CREATININE 2.1* 1.8*   CALCIUM 8.8 8.7   ALBUMIN 2.5* 2.3*   MG 1.7 1.4*   PHOS 4.2  4.2 3.6  3.6     Echo :      1 - Severe left atrial enlargement.     2 - Concentric hypertrophy.     3 - No wall motion abnormalities.     4 - Low normal to mildly depressed left ventricular systolic function (EF 50-55%).     5 - Impaired LV relaxation, elevated LAP (grade 2 diastolic dysfunction).     6 - Low normal to mildly depressed right ventricular systolic function .     7 - Pulmonary hypertension. The estimated PA systolic pressure is 83 mmHg.     8 - Low flow, low gradient aortic valve stenosis with preserved EF ((ELIZABETH 0.61 cm2, AVAi 0.31 cm2/m2, peak aortic jet velocity 4.0 m/s,MG 37 mmHg, EF 50%,SVi 30 mL/m2).     9 - Mild aortic regurgitation.     10 - Mild to moderate mitral regurgitation.     11 - Mild to moderate tricuspid regurgitation.     12 - Intermediate central venous pressure.     13 - Right pleural effusion.   Significant Imaging:    CXR: I have reviewed all pertinent results/findings within the past 24 hours and my personal  findings are:  Stable bilateral multifocal infiltrates.   4/19 no new chest x-ray.  BNP 3526 on 4/17       Assessment/Plan:     * Acute on chronic diastolic congestive heart failure    Strict I's and O's  IV Lasix 80 mg to 12 hours.  Check repeat 2-D echo  4/18 2-D echo reviewed.  Continue diuresis.  4/19 continue IV Lasix 80 mg every 12 hours  Repeat BNP in a.m.      Hypomagnesemia    Replete magnesium.repeat level in a.m.        Acute respiratory failure    O2 keep sat above 90%.  Use BiPAP when necessary for distress.  4/18 wean O2 as tolerated keeps it above 90%.  Please note patient does not have oxygen at home.  BiPAP if necessary.  4/19 wean O2 as tolerated.  Keep sat above 90%        CAP (community acquired pneumonia)    Continue IV Rocephin and azithromycin.  Follow on blood culture and urine culture.  Repeat chest x-ray in a.m.  4/18 cultures negative.  Continue Rocephin and azithromycin.  Patient afebrile today.  4/19 continue IV Rocephin and azithromycin.chest x-ray in a.m.           Jimbo Cheek MD  Pulmonology  [facility]

## 2018-04-19 NOTE — PROGRESS NOTES
[facility]  Cardiology  Progress Note    Patient Name: Diana Turner  MRN: [id number]  Admission Date: 4/17/2018  Hospital Length of Stay: 2 days  Code Status: Full Code   Attending Physician: Tam Ferrer MD   Primary Care Physician: Cierra Woods DO  Expected Discharge Date:   Principal Problem:Acute on chronic diastolic congestive heart failure    Subjective:   HPI:  Ms. Turner is an 84 year old female patient with a PMHx of SSS s/p PPM, AI/AS, diastolic CHF, HTN, hyperlipidemia, and carotid artery disease who presented to [facility] ED yesterday with a chief complaint of progressively worsening SOB over the past week. Associated symptoms included leg swelling, orthopnea, and PND. Patient denied any associated chest pain, palpitations, near syncope, or syncope. EMS reported O2 sat of 84% upon arrival to home. Initial workup in ED revealed +fever (101), creatinine of 2.1, troponin of 0.375, +procalcitonin (1.58), lactic acidosis (2.4), and BNP of 3,526. CXR showed right lung field infiltrate as well as vascular congestion and patient subsequently admitted for further evaluation and treatment. Cardiology consulted to assist with management. Patient seen and examined today, sitting up in bed. HPI limited due to dementia. No real complaints. States she is feeling better. Admits she forgot her meds over the past week. Chart reviewed. Troponin elevated but flat 0.375>0.385. BC pending. 2D echo pending.       Hospital Course:   4/18/18-Patient seen and examined today. Feels better, SOB improved. No chest pain. Labs reviewed. Troponin peaked at .241, now trending down. Creatinine 2.1. 2D echo reviewed and showed EF of 50-55%, DD, severe AS, pulmonary HTN.    4/19/18-Patient seen and examined today. Complains of feeling weak and tired. Reports poor appetite. No chest pain. SOB improving but audible wheezes noted. Labs reviewed. Creatinine 1.8.        Review of Systems   Constitution: Positive for  decreased appetite, weakness and malaise/fatigue.   HENT: Negative.    Eyes: Negative.    Cardiovascular: Positive for dyspnea on exertion and leg swelling.   Respiratory: Positive for shortness of breath.    Endocrine: Negative.    Hematologic/Lymphatic: Negative.    Skin: Negative.    Musculoskeletal: Negative.    Gastrointestinal: Negative.    Genitourinary: Negative.    Psychiatric/Behavioral: Negative.    Allergic/Immunologic: Negative.      Objective:     Vital Signs (Most Recent):  Temp: 98.4 °F (36.9 °C) (04/19/18 0758)  Pulse: 61 (04/19/18 1223)  Resp: 20 (04/19/18 1223)  BP: (!) 151/68 (04/19/18 1223)  SpO2: (!) 94 % (04/19/18 1223) Vital Signs (24h Range):  Temp:  [97.9 °F (36.6 °C)-98.5 °F (36.9 °C)] 98.4 °F (36.9 °C)  Pulse:  [60-63] 61  Resp:  [19-21] 20  SpO2:  [93 %-95 %] 94 %  BP: (140-151)/(61-68) 151/68     Weight: 90.4 kg (199 lb 4.7 oz)  Body mass index is 34.21 kg/m².     SpO2: (!) 94 %  O2 Device (Oxygen Therapy): nasal cannula      Intake/Output Summary (Last 24 hours) at 04/19/18 1255  Last data filed at 04/19/18 1200   Gross per 24 hour   Intake              540 ml   Output             1945 ml   Net            -1405 ml       Lines/Drains/Airways     Drain                 Urethral Catheter 04/17/18 0352 Latex 16 Fr. 2 days          Peripheral Intravenous Line                 Peripheral IV - Single Lumen 04/17/18 0323 Right Antecubital 2 days                Physical Exam   Constitutional: She is oriented to person, place, and time. She appears well-developed and well-nourished. No distress.   HENT:   Head: Normocephalic and atraumatic.   Eyes: Pupils are equal, round, and reactive to light. Right eye exhibits no discharge. Left eye exhibits no discharge.   Neck: Neck supple. No JVD present.   Cardiovascular: Normal rate, regular rhythm, S1 normal and S2 normal.    Murmur heard.   Harsh midsystolic murmur is present  at the upper right sternal border radiating to the neck  Pulmonary/Chest:  Effort normal. No respiratory distress. She has wheezes.   Diminished at bases    PPM site well-healed   Abdominal: Soft. She exhibits no distension. There is no tenderness. There is no rebound.   Musculoskeletal: She exhibits edema.   Neurological: She is alert and oriented to person, place, and time.   Skin: Skin is warm and dry. She is not diaphoretic. No erythema.   CVI changes BLE       Psychiatric: She has a normal mood and affect. Her behavior is normal. Thought content normal.   Nursing note and vitals reviewed.      Significant Labs:   CMP   Recent Labs  Lab 04/18/18  0550 04/19/18  0609    136   K 3.8 3.1*    98   CO2 24 24    115*   BUN 59* 58*   CREATININE 2.1* 1.8*   CALCIUM 8.8 8.7   ALBUMIN 2.5* 2.3*   ANIONGAP 12 14   ESTGFRAFRICA 24* 29*   EGFRNONAA 21* 25*   , CBC   Recent Labs  Lab 04/18/18  0550 04/19/18  0609   WBC 8.91 9.45   HGB 9.3* 9.2*   HCT 27.7* 26.8*    207   , Troponin   Recent Labs  Lab 04/18/18  2356 04/19/18  0609 04/19/18  1128   TROPONINI 0.144* 0.137* 0.116*    and All pertinent lab results from the last 24 hours have been reviewed.    Significant Imaging: Echocardiogram:   2D echo with color flow doppler:   Results for orders placed or performed during the hospital encounter of 04/17/18   2D echo with color flow doppler   Result Value Ref Range    EF 50 55 - 65    Mitral Valve Regurgitation MILD TO MODERATE     Diastolic Dysfunction Yes (A)     Aortic Valve Regurgitation MILD     Aortic Valve Stenosis SEVERE (A)     Est. PA Systolic Pressure 83 (A)     Tricuspid Valve Regurgitation MILD TO MODERATE     and X-Ray: CXR: X-Ray Chest 1 View (CXR):   Results for orders placed or performed during the hospital encounter of 04/17/18   X-Ray Chest 1 View    Narrative    History: Respiratory failure    Comparison: 4/17/18    Result: Single view of the chest.       In comparison to the prior study, there is no adverse interval changes.    Impression     No adverse  interval change      Electronically signed by: ELROY GOLDSMITH MD  Date:     04/18/18  Time:    08:07     and X-Ray Chest PA and Lateral (CXR): No results found for this visit on 04/17/18.    Assessment and Plan:   Patient who presents with decompensated diastolic CHF in setting of pneumonia and valvular heart disease. Responding well to IV diuresis, switch to po dose tmw. Continue other meds.     * Acute on chronic diastolic congestive heart failure    -Decompensated, secondary to pneumonia and medication non-compliance  -Severe AS also contributing   -Improved with IV diuresis  -Resume po meds tmw  -Continue ASA, BB  -No ACEI/ARB given renal function        Severe aortic stenosis    -ELIZABETH 0.6 on 2D echo  -Will need TAVR workup as OP if agreeable        CAP (community acquired pneumonia)    -Mgmt as per primary team; on abx        Acute on chronic renal failure    -Creatinine stable at 1.8        Elevated troponin    -Peaked at 0.241, now trending down  -Elevated but flat, secondary to demand ischemia from pneumonia, volume overload, and underlying renal failure  -Continue home medications-ASA, BB, Zetia  -2D echo showed EF of 50-55%, DD, severe AS, pulmonary HTN  -Would benefit from stress test        Benign hypertensive heart disease with heart failure    -Continue ASA, atenolol, Zetia, Cardizem            VTE Risk Mitigation         Ordered     heparin (porcine) injection 5,000 Units  Every 8 hours      04/17/18 0447     IP VTE HIGH RISK PATIENT  Once      04/17/18 0447     Place sequential compression device  Until discontinued      04/17/18 0442          ABRAHAM StubbsC  Cardiology  [facility]    Chart reviewed. Dr. Browne examined patient and agrees with plan as outlined above.

## 2018-04-19 NOTE — ASSESSMENT & PLAN NOTE
O2 keep sat above 90%.  Use BiPAP when necessary for distress.  4/18 wean O2 as tolerated keeps it above 90%.  Please note patient does not have oxygen at home.  BiPAP if necessary.  4/19 wean O2 as tolerated.  Keep sat above 90%

## 2018-04-19 NOTE — SUBJECTIVE & OBJECTIVE
Interval History:   Review of Systems   Constitutional: Negative for fever.        No fever today   HENT: Negative for nosebleeds.    Eyes: Negative for redness.   Respiratory: Positive for shortness of breath.    Cardiovascular: Positive for leg swelling. Negative for chest pain.   Gastrointestinal: Negative for abdominal pain.   Genitourinary: Negative for hematuria.   Musculoskeletal: Negative for falls.   Skin: Positive for rash.   Neurological: Negative for seizures and loss of consciousness.   Endo/Heme/Allergies: Bruises/bleeds easily.   Psychiatric/Behavioral: Negative for suicidal ideas.       Objective:     Vital Signs (Most Recent):  Temp: 98.4 °F (36.9 °C) (04/19/18 0758)  Pulse: 60 (04/19/18 1518)  Resp: 20 (04/19/18 1223)  BP: (!) 151/68 (04/19/18 1223)  SpO2: (!) 94 % (04/19/18 1223) Vital Signs (24h Range):  Temp:  [97.9 °F (36.6 °C)-98.5 °F (36.9 °C)] 98.4 °F (36.9 °C)  Pulse:  [60-66] 60  Resp:  [19-21] 20  SpO2:  [93 %-95 %] 94 %  BP: (140-151)/(61-68) 151/68     Weight: 90.4 kg (199 lb 4.7 oz)  Body mass index is 34.21 kg/m².      Intake/Output Summary (Last 24 hours) at 04/19/18 1547  Last data filed at 04/19/18 1452   Gross per 24 hour   Intake              780 ml   Output             3301 ml   Net            -2521 ml       Physical Exam   Constitutional: She appears well-developed and well-nourished.   HENT:   Head: Atraumatic.   Eyes: EOM are normal.   Neck: Neck supple.   Cardiovascular: Normal rate.    Pulmonary/Chest: Effort normal. No respiratory distress. She has no wheezes. She has rales. She exhibits no tenderness.   Abdominal: Soft. There is no tenderness.   Genitourinary:   Genitourinary Comments: Jama in place   Musculoskeletal: She exhibits edema.   Improving lower extremity edema.   Neurological: She is alert.   Oriented to place.   Skin: Skin is warm. Rash noted.   Bruises over upper and lower extremities.   Psychiatric:   Not agitated       Vents:       Lines/Drains/Airways      Drain                 Urethral Catheter 04/17/18 0352 Latex 16 Fr. 2 days          Peripheral Intravenous Line                 Peripheral IV - Single Lumen 04/17/18 0323 Right Antecubital 2 days                Significant Labs:    CBC/Anemia Profile:    Recent Labs  Lab 04/18/18  0550 04/19/18  0609   WBC 8.91 9.45   HGB 9.3* 9.2*   HCT 27.7* 26.8*    207   * 105*   RDW 14.9* 14.7*        Chemistries:    Recent Labs  Lab 04/18/18  0550 04/19/18  0609    136   K 3.8 3.1*    98   CO2 24 24   BUN 59* 58*   CREATININE 2.1* 1.8*   CALCIUM 8.8 8.7   ALBUMIN 2.5* 2.3*   MG 1.7 1.4*   PHOS 4.2  4.2 3.6  3.6     Echo :      1 - Severe left atrial enlargement.     2 - Concentric hypertrophy.     3 - No wall motion abnormalities.     4 - Low normal to mildly depressed left ventricular systolic function (EF 50-55%).     5 - Impaired LV relaxation, elevated LAP (grade 2 diastolic dysfunction).     6 - Low normal to mildly depressed right ventricular systolic function .     7 - Pulmonary hypertension. The estimated PA systolic pressure is 83 mmHg.     8 - Low flow, low gradient aortic valve stenosis with preserved EF ((ELIZABETH 0.61 cm2, AVAi 0.31 cm2/m2, peak aortic jet velocity 4.0 m/s,MG 37 mmHg, EF 50%,SVi 30 mL/m2).     9 - Mild aortic regurgitation.     10 - Mild to moderate mitral regurgitation.     11 - Mild to moderate tricuspid regurgitation.     12 - Intermediate central venous pressure.     13 - Right pleural effusion.   Significant Imaging:    CXR: I have reviewed all pertinent results/findings within the past 24 hours and my personal findings are:  Stable bilateral multifocal infiltrates.   4/19 no new chest x-ray.  BNP 3526 on 4/17

## 2018-04-19 NOTE — ASSESSMENT & PLAN NOTE
Continue IV Rocephin and azithromycin.  Follow on blood culture and urine culture.  Repeat chest x-ray in a.m.  4/18 cultures negative.  Continue Rocephin and azithromycin.  Patient afebrile today.  4/19 continue IV Rocephin and azithromycin.chest x-ray in a.m.

## 2018-04-19 NOTE — PLAN OF CARE
Problem: Patient Care Overview  Goal: Plan of Care Review  PT REQUIRES MOD A FOR T/F TO CHAIR WITH RW    Pt AAO x 3, confused at times.  VSS.  Pt remained afebrile throughout this shift.   Pt remained free of falls this shift.   Pt no c/o pain this shift.  Plan of care reviewed. Patient verbalizes understanding.   Pt moving/turing every 2 hrs with assist with pillows.   Patient AV paced on monitor.   Bed low, side rails up x 2, wheels locked, call light in reach.   Patient instructed to call for assistance.   Hourly rounding completed.   Will continue to monitor.

## 2018-04-19 NOTE — PT/OT/SLP PROGRESS
Physical Therapy  Treatment    Diana Turner   MRN: [id number]   Admitting Diagnosis: Acute on chronic diastolic congestive heart failure    PT Received On: 04/19/18  PT Start Time: 1145     PT Stop Time: 1210    PT Total Time (min): 25 min       Billable Minutes:  Therapeutic Activity 15 and Therapeutic Exercise 10    Treatment Type: Treatment  PT/PTA: PT             General Precautions: Standard, fall  Orthopedic Precautions: N/A   Braces: N/A         Subjective:  Communicated with EPIC AND NURSE GANDARA prior to session.  PT AGREES TO THERAPY, FEELS A LITTLE BETTER TODAY, BUT NECK HURTS    Pain/Comfort  Pain Rating 1: 5/10  Location - Side 1: Right  Location 1: neck  Pain Addressed 1: Reposition, Distraction    Objective:   Patient found with: peripheral IV, oxygen, telemetry    Functional Mobility:  Bed Mobility: MODA X 1 FOR SUPINE TO SIT, WITH INCREASED TIME NEEDED       Transfers: NT       Gait: NT      Therapeutic Activities and Exercises:  PT EDUCATION,  SITTING TOLERANCE ON SIDE OF BED, 15 MINS,  THEN T/F TO BEDSIDE RECLINER FOR OOB TOLERANCE     AM-PAC 6 CLICK MOBILITY  How much help from another person does this patient currently need?   1 = Unable, Total/Dependent Assistance  2 = A lot, Maximum/Moderate Assistance  3 = A little, Minimum/Contact Guard/Supervision  4 = None, Modified [place]/Independent    Turning over in bed (including adjusting bedclothes, sheets and blankets)?: 2  Sitting down on and standing up from a chair with arms (e.g., wheelchair, bedside commode, etc.): 2  Moving from lying on back to sitting on the side of the bed?: 2  Moving to and from a bed to a chair (including a wheelchair)?: 2  Need to walk in hospital room?: 2  Climbing 3-5 steps with a railing?: 1  Total Score: 11    AM-PAC Raw Score CMS G-Code Modifier Level of Impairment Assistance   6 % Total / Unable   7 - 9 CM 80 - 100% Maximal Assist   10 - 14 CL 60 - 80% Moderate Assist   15 - 19 CK 40 - 60% Moderate  Assist   20 - 22 CJ 20 - 40% Minimal Assist   23 CI 1-20% SBA / CGA   24 CH 0% Independent/ Mod I     Patient left up in chair with all lines intact, call button in reach and NURSE notified.    Assessment:  Diana Turner is a 84 y.o. female with a medical diagnosis of Acute on chronic diastolic congestive heart failure and presents with IMPAIRED MOBILITY AND WILL NEED CONT P.T.    Rehab identified problem list/impairments: Rehab identified problem list/impairments: weakness, impaired endurance, impaired balance, impaired functional mobilty, gait instability, decreased safety awareness, impaired cardiopulmonary response to activity, edema, impaired coordination    Rehab potential is good.    Activity tolerance: Good    Discharge recommendations: Discharge Facility/Level Of Care Needs: nursing facility, skilled     Barriers to discharge:  NONE    Equipment recommendations: Equipment Needed After Discharge: bedside commode     GOALS:    Physical Therapy Goals        Problem: Physical Therapy Goal    Goal Priority Disciplines Outcome Goal Variances Interventions   Physical Therapy Goal     PT/OT, PT Ongoing (interventions implemented as appropriate)     Description:  PT WILL BE SEEN FOR P.T. FOR A MIN OF 5 OUT OF 7 DAYS A WEEK  LT18  1. PT WILL COMPLETE BED MOBILITY WITH MIN A  2. PT WILL T/F TO CHAIR WITH MIN A  3. PT WILL GT TRAIN X 50' WITH RW AND MIN A  4. PT WILL  COMPLETE B LE TE X 20 REPS FOR STRENGTHENING                    PLAN:    Patient to be seen 5 x/week  to address the above listed problems via gait training, therapeutic activities, therapeutic exercises  Plan of Care expires: 18  Plan of Care reviewed with: patient         Marilou Benson, PT  2018

## 2018-04-19 NOTE — PLAN OF CARE
Problem: Physical Therapy Goal  Goal: Physical Therapy Goal  PT WILL BE SEEN FOR P.T. FOR A MIN OF 5 OUT OF 7 DAYS A WEEK  LT18  1. PT WILL COMPLETE BED MOBILITY WITH MIN A  2. PT WILL T/F TO CHAIR WITH MIN A  3. PT WILL GT TRAIN X 50' WITH RW AND MIN A  4. PT WILL  COMPLETE B LE TE X 20 REPS FOR STRENGTHENING   Outcome: Ongoing (interventions implemented as appropriate)  PT SAT UP ON SIDE OF BED TO EAT LUNCH, WITH MODA FOR BED MOBILITY, AND PILLOW SUPPORT BEHIND BACK FOR AT LEAST 15 MINS

## 2018-04-19 NOTE — ASSESSMENT & PLAN NOTE
Strict I's and O's  IV Lasix 80 mg to 12 hours.  Check repeat 2-D echo  4/18 2-D echo reviewed.  Continue diuresis.  4/19 continue IV Lasix 80 mg every 12 hours

## 2018-04-19 NOTE — ASSESSMENT & PLAN NOTE
Strict I's and O's  IV Lasix 80 mg to 12 hours.  Check repeat 2-D echo  4/18 2-D echo reviewed.  Continue diuresis.  4/19 continue IV Lasix 80 mg every 12 hours.  Repeat chest x-ray and repeat BNP in a.m.  4/20 Stable CXR. BNP 4900. Cont lasix 80 mg q 12 hrs .

## 2018-04-19 NOTE — PLAN OF CARE
Problem: Patient Care Overview  Goal: Plan of Care Review  PT REQUIRES MOD A FOR T/F TO CHAIR WITH RW    Outcome: Ongoing (interventions implemented as appropriate)  Patient stable resting through shift. O2 at 2 LPM nasal cannula, sats remaining > 93%. Fluid restriction reinforced. Strict intake and output monitored. AV paced on cardiac monitor. Educated on fall risk and fall precautions maintained with bed alarm set and door left open for easy visualization. Remains free from injury or falls this shift. Instructed to call for assistance. Plan of care reviewed with patient, patient verbalized understanding.

## 2018-04-19 NOTE — PROGRESS NOTES
Pt voided post de santiago removal.   Could not measure due to pt not being able to get out of bed fast enough.   Pt soiled incontinence pad and fitted sheet.  Pt cleaned and linen changed.  Will continue to monitor.

## 2018-04-19 NOTE — SUBJECTIVE & OBJECTIVE
Review of Systems   Constitution: Positive for decreased appetite, weakness and malaise/fatigue.   HENT: Negative.    Eyes: Negative.    Cardiovascular: Positive for dyspnea on exertion and leg swelling.   Respiratory: Positive for shortness of breath.    Endocrine: Negative.    Hematologic/Lymphatic: Negative.    Skin: Negative.    Musculoskeletal: Negative.    Gastrointestinal: Negative.    Genitourinary: Negative.    Psychiatric/Behavioral: Negative.    Allergic/Immunologic: Negative.      Objective:     Vital Signs (Most Recent):  Temp: 98.4 °F (36.9 °C) (04/19/18 0758)  Pulse: 61 (04/19/18 1223)  Resp: 20 (04/19/18 1223)  BP: (!) 151/68 (04/19/18 1223)  SpO2: (!) 94 % (04/19/18 1223) Vital Signs (24h Range):  Temp:  [97.9 °F (36.6 °C)-98.5 °F (36.9 °C)] 98.4 °F (36.9 °C)  Pulse:  [60-63] 61  Resp:  [19-21] 20  SpO2:  [93 %-95 %] 94 %  BP: (140-151)/(61-68) 151/68     Weight: 90.4 kg (199 lb 4.7 oz)  Body mass index is 34.21 kg/m².     SpO2: (!) 94 %  O2 Device (Oxygen Therapy): nasal cannula      Intake/Output Summary (Last 24 hours) at 04/19/18 1255  Last data filed at 04/19/18 1200   Gross per 24 hour   Intake              540 ml   Output             1945 ml   Net            -1405 ml       Lines/Drains/Airways     Drain                 Urethral Catheter 04/17/18 0352 Latex 16 Fr. 2 days          Peripheral Intravenous Line                 Peripheral IV - Single Lumen 04/17/18 0323 Right Antecubital 2 days                Physical Exam   Constitutional: She is oriented to person, place, and time. She appears well-developed and well-nourished. No distress.   HENT:   Head: Normocephalic and atraumatic.   Eyes: Pupils are equal, round, and reactive to light. Right eye exhibits no discharge. Left eye exhibits no discharge.   Neck: Neck supple. No JVD present.   Cardiovascular: Normal rate, regular rhythm, S1 normal and S2 normal.    Murmur heard.   Harsh midsystolic murmur is present  at the upper right sternal  border radiating to the neck  Pulmonary/Chest: Effort normal. No respiratory distress. She has wheezes.   Diminished at bases    PPM site well-healed   Abdominal: Soft. She exhibits no distension. There is no tenderness. There is no rebound.   Musculoskeletal: She exhibits edema.   Neurological: She is alert and oriented to person, place, and time.   Skin: Skin is warm and dry. She is not diaphoretic. No erythema.   CVI changes BLE       Psychiatric: She has a normal mood and affect. Her behavior is normal. Thought content normal.   Nursing note and vitals reviewed.      Significant Labs:   CMP   Recent Labs  Lab 04/18/18  0550 04/19/18  0609    136   K 3.8 3.1*    98   CO2 24 24    115*   BUN 59* 58*   CREATININE 2.1* 1.8*   CALCIUM 8.8 8.7   ALBUMIN 2.5* 2.3*   ANIONGAP 12 14   ESTGFRAFRICA 24* 29*   EGFRNONAA 21* 25*   , CBC   Recent Labs  Lab 04/18/18  0550 04/19/18  0609   WBC 8.91 9.45   HGB 9.3* 9.2*   HCT 27.7* 26.8*    207   , Troponin   Recent Labs  Lab 04/18/18  2356 04/19/18  0609 04/19/18  1128   TROPONINI 0.144* 0.137* 0.116*    and All pertinent lab results from the last 24 hours have been reviewed.    Significant Imaging: Echocardiogram:   2D echo with color flow doppler:   Results for orders placed or performed during the hospital encounter of 04/17/18   2D echo with color flow doppler   Result Value Ref Range    EF 50 55 - 65    Mitral Valve Regurgitation MILD TO MODERATE     Diastolic Dysfunction Yes (A)     Aortic Valve Regurgitation MILD     Aortic Valve Stenosis SEVERE (A)     Est. PA Systolic Pressure 83 (A)     Tricuspid Valve Regurgitation MILD TO MODERATE     and X-Ray: CXR: X-Ray Chest 1 View (CXR):   Results for orders placed or performed during the hospital encounter of 04/17/18   X-Ray Chest 1 View    Narrative    History: Respiratory failure    Comparison: 4/17/18    Result: Single view of the chest.       In comparison to the prior study, there is no adverse  interval changes.    Impression     No adverse interval change      Electronically signed by: ELROY GOLDSMITH MD  Date:     04/18/18  Time:    08:07     and X-Ray Chest PA and Lateral (CXR): No results found for this visit on 04/17/18.

## 2018-04-20 VITALS
WEIGHT: 191.81 LBS | HEIGHT: 64 IN | DIASTOLIC BLOOD PRESSURE: 65 MMHG | HEART RATE: 63 BPM | RESPIRATION RATE: 18 BRPM | OXYGEN SATURATION: 96 % | BODY MASS INDEX: 32.74 KG/M2 | SYSTOLIC BLOOD PRESSURE: 141 MMHG | TEMPERATURE: 96 F

## 2018-04-20 LAB
ALBUMIN SERPL BCP-MCNC: 2.3 G/DL
ANION GAP SERPL CALC-SCNC: 11 MMOL/L
BASOPHILS # BLD AUTO: 0.01 K/UL
BASOPHILS NFR BLD: 0.1 %
BNP SERPL-MCNC: >4900 PG/ML
BUN SERPL-MCNC: 51 MG/DL
CALCIUM SERPL-MCNC: 8.9 MG/DL
CHLORIDE SERPL-SCNC: 95 MMOL/L
CO2 SERPL-SCNC: 30 MMOL/L
CREAT SERPL-MCNC: 1.5 MG/DL
DIFFERENTIAL METHOD: ABNORMAL
EOSINOPHIL # BLD AUTO: 0.1 K/UL
EOSINOPHIL NFR BLD: 0.7 %
ERYTHROCYTE [DISTWIDTH] IN BLOOD BY AUTOMATED COUNT: 14.6 %
EST. GFR  (AFRICAN AMERICAN): 37 ML/MIN/1.73 M^2
EST. GFR  (NON AFRICAN AMERICAN): 32 ML/MIN/1.73 M^2
GLUCOSE SERPL-MCNC: 130 MG/DL
HCT VFR BLD AUTO: 27.4 %
HGB BLD-MCNC: 9.4 G/DL
LYMPHOCYTES # BLD AUTO: 0.7 K/UL
LYMPHOCYTES NFR BLD: 7.5 %
MAGNESIUM SERPL-MCNC: 1.7 MG/DL
MCH RBC QN AUTO: 36.2 PG
MCHC RBC AUTO-ENTMCNC: 34.3 G/DL
MCV RBC AUTO: 105 FL
MONOCYTES # BLD AUTO: 1.2 K/UL
MONOCYTES NFR BLD: 12.5 %
NEUTROPHILS # BLD AUTO: 7.6 K/UL
NEUTROPHILS NFR BLD: 79.2 %
PHOSPHATE SERPL-MCNC: 3.3 MG/DL
PHOSPHATE SERPL-MCNC: 3.3 MG/DL
PLATELET # BLD AUTO: 237 K/UL
PMV BLD AUTO: 10.9 FL
POTASSIUM SERPL-SCNC: 2.9 MMOL/L
RBC # BLD AUTO: 2.6 M/UL
SODIUM SERPL-SCNC: 136 MMOL/L
TROPONIN I SERPL DL<=0.01 NG/ML-MCNC: 0.1 NG/ML
TROPONIN I SERPL DL<=0.01 NG/ML-MCNC: 0.11 NG/ML
TROPONIN I SERPL DL<=0.01 NG/ML-MCNC: 0.12 NG/ML
WBC # BLD AUTO: 9.58 K/UL

## 2018-04-20 PROCEDURE — 25000003 PHARM REV CODE 250: Performed by: HOSPITALIST

## 2018-04-20 PROCEDURE — 63600175 PHARM REV CODE 636 W HCPCS: Performed by: HOSPITALIST

## 2018-04-20 PROCEDURE — 97530 THERAPEUTIC ACTIVITIES: CPT

## 2018-04-20 PROCEDURE — 80069 RENAL FUNCTION PANEL: CPT

## 2018-04-20 PROCEDURE — 97535 SELF CARE MNGMENT TRAINING: CPT

## 2018-04-20 PROCEDURE — 85025 COMPLETE CBC W/AUTO DIFF WBC: CPT

## 2018-04-20 PROCEDURE — 63600175 PHARM REV CODE 636 W HCPCS: Performed by: INTERNAL MEDICINE

## 2018-04-20 PROCEDURE — 27000221 HC OXYGEN, UP TO 24 HOURS

## 2018-04-20 PROCEDURE — 83735 ASSAY OF MAGNESIUM: CPT

## 2018-04-20 PROCEDURE — 99232 SBSQ HOSP IP/OBS MODERATE 35: CPT | Mod: ,,, | Performed by: INTERNAL MEDICINE

## 2018-04-20 PROCEDURE — 25000003 PHARM REV CODE 250: Performed by: INTERNAL MEDICINE

## 2018-04-20 PROCEDURE — 84484 ASSAY OF TROPONIN QUANT: CPT

## 2018-04-20 PROCEDURE — 83880 ASSAY OF NATRIURETIC PEPTIDE: CPT

## 2018-04-20 PROCEDURE — 36415 COLL VENOUS BLD VENIPUNCTURE: CPT

## 2018-04-20 PROCEDURE — 94761 N-INVAS EAR/PLS OXIMETRY MLT: CPT

## 2018-04-20 RX ORDER — HYDRALAZINE HYDROCHLORIDE 25 MG/1
25 TABLET, FILM COATED ORAL EVERY 8 HOURS
Status: DISCONTINUED | OUTPATIENT
Start: 2018-04-20 | End: 2018-04-20 | Stop reason: HOSPADM

## 2018-04-20 RX ORDER — MAGNESIUM SULFATE 1 G/100ML
1 INJECTION INTRAVENOUS ONCE
Status: DISCONTINUED | OUTPATIENT
Start: 2018-04-20 | End: 2018-04-20 | Stop reason: HOSPADM

## 2018-04-20 RX ADMIN — GUAIFENESIN AND DEXTROMETHORPHAN HYDROBROMIDE 1 TABLET: 600; 30 TABLET, EXTENDED RELEASE ORAL at 08:04

## 2018-04-20 RX ADMIN — AZITHROMYCIN MONOHYDRATE 500 MG: 500 INJECTION, POWDER, LYOPHILIZED, FOR SOLUTION INTRAVENOUS at 06:04

## 2018-04-20 RX ADMIN — DILTIAZEM HYDROCHLORIDE 240 MG: 120 CAPSULE, COATED, EXTENDED RELEASE ORAL at 08:04

## 2018-04-20 RX ADMIN — FUROSEMIDE 80 MG: 10 INJECTION, SOLUTION INTRAMUSCULAR; INTRAVENOUS at 09:04

## 2018-04-20 RX ADMIN — CEFTRIAXONE SODIUM 1 G: 1 INJECTION, POWDER, FOR SOLUTION INTRAMUSCULAR; INTRAVENOUS at 04:04

## 2018-04-20 RX ADMIN — EZETIMIBE 10 MG: 10 TABLET ORAL at 08:04

## 2018-04-20 RX ADMIN — ALLOPURINOL 200 MG: 100 TABLET ORAL at 08:04

## 2018-04-20 RX ADMIN — HEPARIN SODIUM 5000 UNITS: 5000 INJECTION, SOLUTION INTRAVENOUS; SUBCUTANEOUS at 04:04

## 2018-04-20 RX ADMIN — ATENOLOL 100 MG: 50 TABLET ORAL at 08:04

## 2018-04-20 RX ADMIN — SENNOSIDES 1 TABLET: 8.6 TABLET, FILM COATED ORAL at 08:04

## 2018-04-20 RX ADMIN — ASPIRIN 81 MG 81 MG: 81 TABLET ORAL at 08:04

## 2018-04-20 NOTE — PROGRESS NOTES
[facility] Hospital Medicine  Progress Note    Patient Name: Diana Turner  MRN: [id number]  Patient Class: IP- Inpatient   Admission Date: 4/17/2018  Length of Stay: 2 days  Attending Physician: Tam Ferrer MD  Primary Care Provider: Cierra Woods DO        Subjective:     Principal Problem:Acute on chronic diastolic congestive heart failure    HPI:  84F h/o HFpEF, HTN, SSS s/p PPM, presents with SOB x 1 week.  Was transported by EMS to ER .   Per EMS, patient SpO2 on scene was 84% with RR 45.  Patient was placed on CPAP on scene.  Associated with ZELAYA, PND and orthopnea.  No other exacerbating or alleviating factors.  Patient had not been compliant on  her medications for the last week.  She saw cardiology (veronica) yesterday who advised patient to be compliant on medications, especially lasix 40mg PO BID.   Patient was placed on Bipap FiO2 100% with sat 97% in Er.  Tmax 101F.   CXR show patchy bilateral infiltrates, cardiomegaly.  BNP>3000 trop 0.345.   Lasix 40mg IV x 1 given in Er.    Blood cx x 2 and Urine cx collected.   Given CTX 2g x 1. Hospital medicine called for admission.         Hospital Course:  Admitted for treatment of heart failure exacerbation and pneumonia.  Empirically started Ceftriaxone and Azithromycin.  Also treated with IV Furosemide.  Diuresis improved shortness of breath.  PT/OT evaluation and case management conference with her daughter about care goals.  Overall improvement but continued cough.  Transfer tot Telemetry 18 April.  Requesting placement with skilled nursing.    Interval History:  Improvement in shortness of breath but continues to have non-productive cough.  No acute problems or complaints.  Tolerating diet.    Review of Systems   Unable to perform ROS: Other   Constitutional: Negative for chills and fever.   HENT: Negative for congestion and sore throat.    Eyes: Negative for visual disturbance.   Respiratory: Positive for cough and shortness of  breath. Negative for wheezing.    Cardiovascular: Negative for chest pain, palpitations and leg swelling.   Gastrointestinal: Negative for abdominal pain, blood in stool, constipation, diarrhea, nausea and vomiting.   Genitourinary: Negative for dysuria and hematuria.   Musculoskeletal: Negative for arthralgias and back pain.   Skin: Negative for rash and wound.   Neurological: Negative for dizziness, weakness, light-headedness and numbness.   Hematological: Negative for adenopathy.     Objective:     Vital Signs (Most Recent):  Temp: 98.5 °F (36.9 °C) (04/19/18 1930)  Pulse: 63 (04/19/18 1930)  Resp: (!) 26 (04/19/18 1930)  BP: (!) 151/90 (04/19/18 1930)  SpO2: 95 % (04/19/18 1930) Vital Signs (24h Range):  Temp:  [98.4 °F (36.9 °C)-98.5 °F (36.9 °C)] 98.5 °F (36.9 °C)  Pulse:  [60-66] 63  Resp:  [19-26] 26  SpO2:  [93 %-97 %] 95 %  BP: (142-160)/(62-90) 151/90     Weight: 90.4 kg (199 lb 4.7 oz)  Body mass index is 34.21 kg/m².    Intake/Output Summary (Last 24 hours) at 04/19/18 1948  Last data filed at 04/19/18 1800   Gross per 24 hour   Intake             1000 ml   Output             4801 ml   Net            -3801 ml      Physical Exam   Constitutional: She is oriented to person, place, and time. She appears well-developed and well-nourished. No distress.   HENT:   Head: Normocephalic and atraumatic.   Mouth/Throat: Oropharynx is clear and moist.   Very hard of hearing.   Eyes: Conjunctivae and EOM are normal. Pupils are equal, round, and reactive to light.   Neck: Neck supple. No JVD present. No thyromegaly present.   Cardiovascular: Normal rate and regular rhythm.  Exam reveals no gallop and no friction rub.    No murmur heard.  Pulmonary/Chest: Effort normal and breath sounds normal. She has no wheezes. She has no rales.   Mild crackles bilaterally.   Abdominal: Soft. Bowel sounds are normal. She exhibits no distension. There is no tenderness. There is no rebound and no guarding.   Musculoskeletal: Normal  range of motion. She exhibits no edema or deformity.   Lymphadenopathy:     She has no cervical adenopathy.   Neurological: She is alert and oriented to person, place, and time. She has normal reflexes. No sensory deficit.   Skin: Skin is warm and dry. No rash noted.   Psychiatric: She has a normal mood and affect. Her behavior is normal. Judgment and thought content normal.   Nursing note and vitals reviewed.      Significant Labs: All pertinent labs within the past 24 hours have been reviewed.    Significant Imaging: I have reviewed all pertinent imaging results/findings within the past 24 hours.    Assessment/Plan:      * Acute on chronic diastolic congestive heart failure    - only given 1 dose IV 40mg lasix in ER  - BNP>3000, with significant SOB  - exacerbation 2/2 noncompliance with last visit in cardiology clinic yesterday (veronica)    - Give lasix 80mg IV BID, first dose now  - strict I/O, daily weights  - Fluid restriction 1L        SA node dysfunction    S/p PPM  HR controlled  Continue atenolol, diltiazem          CAP (community acquired pneumonia)    - R side patchy infiltrate on CXR    - Continue CTX, Add azithromycin  - follow up cx          Acute on chronic renal failure    - Prerenal 2/2 chf exacerbation  - monitor I/O          Elevated troponin    - 2/2 work/demand ischemia from CHF exacerbation  - EKG showing AV paced rhythm, ST abnormalities appears to have J point elevation in precordial leads    - Repeat EKG  - Consult cardiology  - trend troponin x 6            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

## 2018-04-20 NOTE — SUBJECTIVE & OBJECTIVE
Interval History:  Improvement in shortness of breath but continues to have non-productive cough.  No acute problems or complaints.  Tolerating diet.    Review of Systems   Unable to perform ROS: Other   Constitutional: Negative for chills and fever.   HENT: Negative for congestion and sore throat.    Eyes: Negative for visual disturbance.   Respiratory: Positive for cough and shortness of breath. Negative for wheezing.    Cardiovascular: Negative for chest pain, palpitations and leg swelling.   Gastrointestinal: Negative for abdominal pain, blood in stool, constipation, diarrhea, nausea and vomiting.   Genitourinary: Negative for dysuria and hematuria.   Musculoskeletal: Negative for arthralgias and back pain.   Skin: Negative for rash and wound.   Neurological: Negative for dizziness, weakness, light-headedness and numbness.   Hematological: Negative for adenopathy.     Objective:     Vital Signs (Most Recent):  Temp: 98.5 °F (36.9 °C) (04/19/18 1930)  Pulse: 63 (04/19/18 1930)  Resp: (!) 26 (04/19/18 1930)  BP: (!) 151/90 (04/19/18 1930)  SpO2: 95 % (04/19/18 1930) Vital Signs (24h Range):  Temp:  [98.4 °F (36.9 °C)-98.5 °F (36.9 °C)] 98.5 °F (36.9 °C)  Pulse:  [60-66] 63  Resp:  [19-26] 26  SpO2:  [93 %-97 %] 95 %  BP: (142-160)/(62-90) 151/90     Weight: 90.4 kg (199 lb 4.7 oz)  Body mass index is 34.21 kg/m².    Intake/Output Summary (Last 24 hours) at 04/19/18 1948  Last data filed at 04/19/18 1800   Gross per 24 hour   Intake             1000 ml   Output             4801 ml   Net            -3801 ml      Physical Exam   Constitutional: She is oriented to person, place, and time. She appears well-developed and well-nourished. No distress.   HENT:   Head: Normocephalic and atraumatic.   Mouth/Throat: Oropharynx is clear and moist.   Very hard of hearing.   Eyes: Conjunctivae and EOM are normal. Pupils are equal, round, and reactive to light.   Neck: Neck supple. No JVD present. No thyromegaly present.    Cardiovascular: Normal rate and regular rhythm.  Exam reveals no gallop and no friction rub.    No murmur heard.  Pulmonary/Chest: Effort normal and breath sounds normal. She has no wheezes. She has no rales.   Mild crackles bilaterally.   Abdominal: Soft. Bowel sounds are normal. She exhibits no distension. There is no tenderness. There is no rebound and no guarding.   Musculoskeletal: Normal range of motion. She exhibits no edema or deformity.   Lymphadenopathy:     She has no cervical adenopathy.   Neurological: She is alert and oriented to person, place, and time. She has normal reflexes. No sensory deficit.   Skin: Skin is warm and dry. No rash noted.   Psychiatric: She has a normal mood and affect. Her behavior is normal. Judgment and thought content normal.   Nursing note and vitals reviewed.      Significant Labs: All pertinent labs within the past 24 hours have been reviewed.    Significant Imaging: I have reviewed all pertinent imaging results/findings within the past 24 hours.

## 2018-04-20 NOTE — PT/OT/SLP PROGRESS
Occupational Therapy  Treatment    Diana Turner   MRN: [id number]   Admitting Diagnosis: Acute on chronic diastolic congestive heart failure    OT Date of Treatment: 04/20/18   OT Start Time: 1035  OT Stop Time: 1100  OT Total Time (min): 25 min    Billable Minutes:  Self Care/Home Management 10 minutes and Therapeutic Activity 15 minutes    General Precautions: Standard, fall  Orthopedic Precautions: N/A  Braces:           Subjective:  Communicated with nurse Austin and epic chart review prior to session.    Pain/Comfort  Pain Rating 1: 0/10    Objective:  Patient found with: oxygen, telemetry, peripheral IV     Functional Mobility:  Bed Mobility:       Transfers:        Functional Ambulation: pt req mod a with rw and max verbal cues with functional mobility    Activities of Daily Living:     Feeding adaptive equipment: na     UE adaptive equipment: mod a     LE adaptive equipment: na                    Bathing adaptive equipment: na    Balance:   Static Sit: FAIR: Maintains without assist, but unable to take any challenges   Dynamic Sit: FAIR: Cannot move trunk without losing balance  Static Stand: POOR: Needs MODERATE assist to maintain  Dynamic stand: POOR: N/A    Therapeutic Activities and Exercises:  Pt seen in room and no c/o pain. Pt performed min a with bed mobility rolling and supine<>sit. Pt req mod a with forward scooting. Pt req mod a with sit<>stand t/f's and mod a  Functional mobility. Pt left sitting in bedside chair with all needs met and CNA present in room.   AM-PAC 6 CLICK ADL   How much help from another person does this patient currently need?   1 = Unable, Total/Dependent Assistance  2 = A lot, Maximum/Moderate Assistance  3 = A little, Minimum/Contact Guard/Supervision  4 = None, Modified [place]/Independent    Putting on and taking off regular lower body clothing? : 2  Bathing (including washing, rinsing, drying)?: 2  Toileting, which includes using toilet, bedpan, or urinal? :  "2  Putting on and taking off regular upper body clothing?: 2  Taking care of personal grooming such as brushing teeth?: 3  Eating meals?: 3  Total Score: 14     AM-PAC Raw Score CMS "G-Code Modifier Level of Impairment Assistance   6 % Total / Unable   7 - 8 CM 80 - 100% Maximal Assist   9-13 CL 60 - 80% Moderate Assist   14 - 19 CK 40 - 60% Moderate Assist   20 - 22 CJ 20 - 40% Minimal Assist   23 CI 1-20% SBA / CGA   24 CH 0% Independent/ Mod I       Patient left up in chair with all lines intact, call button in reach, nurse notified and cna present present    ASSESSMENT:  Diana Turner is a 84 y.o. female with a medical diagnosis of Acute on chronic diastolic congestive heart failure and presents with debility and generalized weakness. Pt may continue to benefit from skilled o.t..    Rehab identified problem list/impairments: Rehab identified problem list/impairments: weakness, impaired self care skills, impaired balance, decreased coordination, decreased safety awareness, impaired functional mobilty, decreased upper extremity function, gait instability, impaired endurance, decreased lower extremity function, pain    Rehab potential is good.    Activity tolerance: Good    Discharge recommendations: Discharge Facility/Level Of Care Needs: nursing facility, skilled     Barriers to discharge: Barriers to Discharge: None    Equipment recommendations: bedside commode     GOALS:    Occupational Therapy Goals        Problem: Occupational Therapy Goal    Goal Priority Disciplines Outcome Interventions   Occupational Therapy Goal     OT, PT/OT Ongoing (interventions implemented as appropriate)    Description:  Goals to be met by: 4/24/18     Patient will increase functional independence with ADLs by performing:    UE Dressing with Moderate Assistance.  LE Dressing with Maximum Assistance.  Toileting from bedside commode with Moderate Assistance for hygiene and clothing management.   Toilet transfer to bedside " commode with Minimal Assistance.  Upper extremity exercise program x10 reps per handout, with supervision.                      Plan:  Patient to be seen 3 x/week to address the above listed problems via self-care/home management, therapeutic activities, therapeutic exercises  Plan of Care expires: 04/24/18  Plan of Care reviewed with: patient         Christin Mcdaniels, OT  04/20/2018

## 2018-04-20 NOTE — SUBJECTIVE & OBJECTIVE
Interval History: still feels weak and has cough.less short of breath    Review of Systems   Constitution: Positive for weakness and malaise/fatigue. Negative for diaphoresis and weight gain.   HENT: Negative for hoarse voice.    Eyes: Negative for double vision and visual disturbance.   Cardiovascular: Negative for chest pain, claudication, cyanosis, dyspnea on exertion, irregular heartbeat, leg swelling, near-syncope, orthopnea, palpitations, paroxysmal nocturnal dyspnea and syncope.   Respiratory: Positive for cough and wheezing. Negative for hemoptysis, shortness of breath and snoring.    Hematologic/Lymphatic: Negative for bleeding problem. Does not bruise/bleed easily.   Skin: Negative for color change and poor wound healing.   Musculoskeletal: Negative for muscle cramps, muscle weakness and myalgias.   Gastrointestinal: Negative for bloating, abdominal pain, change in bowel habit, diarrhea, heartburn, hematemesis, hematochezia, melena and nausea.   Neurological: Negative for excessive daytime sleepiness, dizziness, headaches, light-headedness, loss of balance and numbness.   Psychiatric/Behavioral: Negative for memory loss. The patient does not have insomnia.    Allergic/Immunologic: Negative for hives.     Objective:     Vital Signs (Most Recent):  Temp: 97.6 °F (36.4 °C) (04/20/18 0757)  Pulse: 69 (04/20/18 0757)  Resp: 20 (04/20/18 0757)  BP: (!) 161/71 (04/20/18 0757)  SpO2: 97 % (04/20/18 0859) Vital Signs (24h Range):  Temp:  [97.6 °F (36.4 °C)-98.5 °F (36.9 °C)] 97.6 °F (36.4 °C)  Pulse:  [60-69] 69  Resp:  [18-26] 20  SpO2:  [92 %-97 %] 97 %  BP: (151-164)/(65-90) 161/71     Weight: 87 kg (191 lb 12.8 oz)  Body mass index is 32.92 kg/m².     SpO2: 97 %  O2 Device (Oxygen Therapy): nasal cannula      Intake/Output Summary (Last 24 hours) at 04/20/18 1024  Last data filed at 04/20/18 0457   Gross per 24 hour   Intake              630 ml   Output             2901 ml   Net            -2271 ml        Lines/Drains/Airways     Peripheral Intravenous Line                 Peripheral IV - Single Lumen 04/17/18 0323 Right Antecubital 3 days                Physical Exam   Constitutional: She is oriented to person, place, and time. She appears well-developed and well-nourished. She does not appear ill. No distress.   HENT:   Head: Normocephalic and atraumatic.   Eyes: EOM are normal. Pupils are equal, round, and reactive to light. No scleral icterus.   Neck: Normal range of motion. Neck supple. Normal carotid pulses, no hepatojugular reflux and no JVD present. Carotid bruit is not present. No tracheal deviation present. No thyromegaly present.   Cardiovascular: Normal rate, regular rhythm, intact distal pulses and normal pulses.  Exam reveals no gallop and no friction rub.    Murmur heard.   Harsh midsystolic murmur is present with a grade of 2/6  at the upper right sternal border radiating to the neck  Pulmonary/Chest: Effort normal and breath sounds normal. No respiratory distress. She has no wheezes. She has no rhonchi. She has no rales. She exhibits no tenderness.   Abdominal: Soft. Normal appearance, normal aorta and bowel sounds are normal. She exhibits no distension, no abdominal bruit, no ascites and no pulsatile midline mass. There is no hepatomegaly. There is no tenderness.   Obese abdomen   Musculoskeletal: She exhibits no edema.        Right shoulder: She exhibits no deformity.   Neurological: She is alert and oriented to person, place, and time. She has normal strength. No cranial nerve deficit. Coordination normal.   Skin: Skin is warm and dry. No rash noted. She is not diaphoretic. No cyanosis or erythema. Nails show no clubbing.   Spider veins noted.   Psychiatric: She has a normal mood and affect. Her speech is normal and behavior is normal.   Nursing note and vitals reviewed.      Significant Labs:   Recent Lab Results       04/20/18  0546 04/19/18  2357 04/19/18  1849 04/19/18  1128      Albumin  2.3(L)        Anion Gap 11        Baso # 0.01        Basophil% 0.1        BNP >4,900  Comment:  Values of less than 100 pg/ml are consistent with non-CHF populations.(H)        BUN, Bld 51(H)        Calcium 8.9        Chloride 95        CO2 30(H)        Creatinine 1.5(H)        Differential Method Automated        eGFR if  37(A)        eGFR if non  32  Comment:  Calculation used to obtain the estimated glomerular filtration  rate (eGFR) is the CKD-EPI equation.   (A)        Eos # 0.1        Eosinophil% 0.7        Glucose 130(H)        Gran # (ANC) 7.6        Gran% 79.2(H)        Hematocrit 27.4(L)        Hemoglobin 9.4(L)        Lymph # 0.7(L)        Lymph% 7.5(L)        Magnesium 1.7        MCH 36.2(H)        MCHC 34.3        (H)        Mono # 1.2(H)        Mono% 12.5        MPV 10.9        Phosphorus 3.3         3.3        Platelets 237        Potassium 2.9(L)        RBC 2.60(L)        RDW 14.6(H)        Sodium 136        Troponin I 0.112  Comment:  The reference interval for Troponin I represents the 99th percentile   cutoff   for our facility and is consistent with 3rd generation assay   performance.  (H) 0.122  Comment:  The reference interval for Troponin I represents the 99th percentile   cutoff   for our facility and is consistent with 3rd generation assay   performance.  (H) 0.107  Comment:  The reference interval for Troponin I represents the 99th percentile   cutoff   for our facility and is consistent with 3rd generation assay   performance.  (H) 0.116  Comment:  The reference interval for Troponin I represents the 99th percentile   cutoff   for our facility and is consistent with 3rd generation assay   performance.  (H)     WBC 9.58              Significant Imaging: X-Ray: CXR: X-Ray Chest 1 View (CXR):   Results for orders placed or performed during the hospital encounter of 04/17/18   X-Ray Chest 1 View    Narrative    History: Respiratory failure    Comparison:  4/18/18    Result: Single view of the chest.       In comparison to the prior study, there is no adverse interval changes.    Impression     No adverse interval change      Electronically signed by: ELROY GOLDSMITH MD  Date:     04/20/18  Time:    08:35

## 2018-04-20 NOTE — PT/OT/SLP PROGRESS
Physical Therapy  Treatment    Diana Turner   MRN: [id number]   Admitting Diagnosis: Acute on chronic diastolic congestive heart failure    PT Received On: 04/20/18  PT Start Time: 1000     PT Stop Time: 1030    PT Total Time (min): 30 min       Billable Minutes:  Therapeutic Activity 30    Treatment Type: Treatment  PT/PTA: PT             General Precautions: Standard, fall  Orthopedic Precautions: N/A   Braces: N/A         Subjective:  Communicated with EPIC AND NURSE BARBI prior to session.  PT AGREES TO THERAPY, DIFFICULTY PROCESSING TODAY    Pain/Comfort  Pain Rating 1: 0/10    Objective:   Patient found with: oxygen, telemetry, peripheral IV    Functional Mobility:  Bed Mobility: MODA X 1 WITH EXTENDED TIME       Transfers: MODA X2 WITH USE OF RW, AND MAX VC'S       Gait: TOOK SMALL STEPS TO TURN AND SIT IN CHAIR      Therapeutic Activities and Exercises:  PT SAT ON SIDE OF BED 8 MINS, TO MANNY GOWN, AND TO COMPLETE A FEW EXERCISES.  PT STOOD TO REMOVE DIAPER , CLEAN UP AND MANNY CLEAN DIAPER. THEN T/F TO RECLINER FOR OOB TOLERANCE.    AM-PAC 6 CLICK MOBILITY  How much help from another person does this patient currently need?   1 = Unable, Total/Dependent Assistance  2 = A lot, Maximum/Moderate Assistance  3 = A little, Minimum/Contact Guard/Supervision  4 = None, Modified [place]/Independent    Turning over in bed (including adjusting bedclothes, sheets and blankets)?: 2  Sitting down on and standing up from a chair with arms (e.g., wheelchair, bedside commode, etc.): 2  Moving from lying on back to sitting on the side of the bed?: 2  Moving to and from a bed to a chair (including a wheelchair)?: 2  Need to walk in hospital room?: 2  Climbing 3-5 steps with a railing?: 1  Total Score: 11    AM-PAC Raw Score CMS G-Code Modifier Level of Impairment Assistance   6 % Total / Unable   7 - 9 CM 80 - 100% Maximal Assist   10 - 14 CL 60 - 80% Moderate Assist   15 - 19 CK 40 - 60% Moderate Assist   20 - 22  CJ 20 - 40% Minimal Assist   23 CI 1-20% SBA / CGA   24 CH 0% Independent/ Mod I     Patient left up in chair with all lines intact, call button in reach and NURSE AND PCT CANDY notified.    Assessment:  Diana Turner is a 84 y.o. female with a medical diagnosis of Acute on chronic diastolic congestive heart failure and presents with IMPAIRED MOBILITY AND WILL NEED CONT THERAPY.    Rehab identified problem list/impairments: Rehab identified problem list/impairments: weakness, impaired endurance, impaired functional mobilty, gait instability, impaired balance, impaired cognition, decreased safety awareness, impaired coordination, edema, impaired skin    Rehab potential is good.    Activity tolerance: Good    Discharge recommendations: Discharge Facility/Level Of Care Needs: nursing facility, skilled     Barriers to discharge:      Equipment recommendations: Equipment Needed After Discharge: bedside commode     GOALS:    Physical Therapy Goals        Problem: Physical Therapy Goal    Goal Priority Disciplines Outcome Goal Variances Interventions   Physical Therapy Goal     PT/OT, PT Ongoing (interventions implemented as appropriate)     Description:  PT WILL BE SEEN FOR P.T. FOR A MIN OF 5 OUT OF 7 DAYS A WEEK  LT18  1. PT WILL COMPLETE BED MOBILITY WITH MIN A  2. PT WILL T/F TO CHAIR WITH MIN A  3. PT WILL GT TRAIN X 50' WITH RW AND MIN A  4. PT WILL  COMPLETE B LE TE X 20 REPS FOR STRENGTHENING                    PLAN:    Patient to be seen 5 x/week  to address the above listed problems via gait training, therapeutic activities, therapeutic exercises  Plan of Care expires: 18  Plan of Care reviewed with: patient         Marilou Benson, PT  2018

## 2018-04-20 NOTE — PROGRESS NOTES
[facility]  Pulmonology  Progress Note    Patient Name: Diana Turner  MRN: [id number]  Admission Date: 4/17/2018  Hospital Length of Stay: 3 days  Code Status: Full Code  Attending Provider: Tam Ferrer MD  Primary Care Provider: Cierra Woods DO   Principal Problem: Acute on chronic diastolic congestive heart failure    Subjective:     Interval History:   Review of Systems   Constitutional: Negative for fever.        No fever today   HENT: Negative for nosebleeds.    Eyes: Negative for redness.   Respiratory: Negative for cough, hemoptysis and shortness of breath.    Cardiovascular: Positive for leg swelling. Negative for chest pain.   Gastrointestinal: Negative for abdominal pain.   Genitourinary: Negative for hematuria.   Musculoskeletal: Negative for falls.   Skin: Positive for rash.   Neurological: Negative for seizures and loss of consciousness.   Endo/Heme/Allergies: Bruises/bleeds easily.   Psychiatric/Behavioral: Negative for suicidal ideas.       Objective:     Vital Signs (Most Recent):  Temp: 96.3 °F (35.7 °C) (04/20/18 1153)  Pulse: 63 (04/20/18 1153)  Resp: 18 (04/20/18 1153)  BP: (!) 141/65 (04/20/18 1153)  SpO2: 96 % (04/20/18 1153) Vital Signs (24h Range):  Temp:  [96.3 °F (35.7 °C)-98.5 °F (36.9 °C)] 96.3 °F (35.7 °C)  Pulse:  [60-69] 63  Resp:  [18-26] 18  SpO2:  [92 %-97 %] 96 %  BP: (141-164)/(65-90) 141/65     Weight: 87 kg (191 lb 12.8 oz)  Body mass index is 32.92 kg/m².      Intake/Output Summary (Last 24 hours) at 04/20/18 1227  Last data filed at 04/20/18 0457   Gross per 24 hour   Intake              510 ml   Output             2901 ml   Net            -2391 ml       Physical Exam   Constitutional: She appears well-developed and well-nourished.   HENT:   Head: Atraumatic.   Eyes: EOM are normal.   Neck: Neck supple.   Cardiovascular: Normal rate.    Pulmonary/Chest: Effort normal. No respiratory distress. She has no wheezes. She has rales. She exhibits no  tenderness.   Abdominal: Soft. There is no tenderness.   Genitourinary:   Genitourinary Comments: Jama in place   Musculoskeletal: She exhibits edema.   Trace LE edema    Neurological: She is alert.   Oriented to place.   Skin: Skin is warm. Rash noted.   Bruises over upper and lower extremities.   Psychiatric:   Not agitated       Vents:       Lines/Drains/Airways     Peripheral Intravenous Line                 Peripheral IV - Single Lumen 04/17/18 0323 Right Antecubital 3 days                Significant Labs:    CBC/Anemia Profile:    Recent Labs  Lab 04/19/18  0609 04/20/18  0546   WBC 9.45 9.58   HGB 9.2* 9.4*   HCT 26.8* 27.4*    237   * 105*   RDW 14.7* 14.6*        Chemistries:    Recent Labs  Lab 04/19/18  0609 04/20/18  0546    136   K 3.1* 2.9*   CL 98 95   CO2 24 30*   BUN 58* 51*   CREATININE 1.8* 1.5*   CALCIUM 8.7 8.9   ALBUMIN 2.3* 2.3*   MG 1.4* 1.7   PHOS 3.6  3.6 3.3  3.3     Echo :      1 - Severe left atrial enlargement.     2 - Concentric hypertrophy.     3 - No wall motion abnormalities.     4 - Low normal to mildly depressed left ventricular systolic function (EF 50-55%).     5 - Impaired LV relaxation, elevated LAP (grade 2 diastolic dysfunction).     6 - Low normal to mildly depressed right ventricular systolic function .     7 - Pulmonary hypertension. The estimated PA systolic pressure is 83 mmHg.     8 - Low flow, low gradient aortic valve stenosis with preserved EF ((ELIZABETH 0.61 cm2, AVAi 0.31 cm2/m2, peak aortic jet velocity 4.0 m/s,MG 37 mmHg, EF 50%,SVi 30 mL/m2).     9 - Mild aortic regurgitation.     10 - Mild to moderate mitral regurgitation.     11 - Mild to moderate tricuspid regurgitation.     12 - Intermediate central venous pressure.     13 - Right pleural effusion.   Significant Imaging:    BNP 3526 on 4/17   CXR 4/20 PERSONALLY REVIEWED , STABLE ALVEOLAR AND INTERSTITIAL DENSITIES .         Assessment/Plan:     * Acute on chronic diastolic congestive  heart failure    Strict I's and O's  IV Lasix 80 mg to 12 hours.  Check repeat 2-D echo  4/18 2-D echo reviewed.  Continue diuresis.  4/19 continue IV Lasix 80 mg every 12 hours.  Repeat chest x-ray and repeat BNP in a.m.  4/20 Stable CXR. BNP 4900. Cont lasix 80 mg q 12 hrs .         Hypomagnesemia    Replete magnesium.repeat level in a.m.  4/20 Mg 1.7 , will replete        Acute respiratory failure    O2 keep sat above 90%.  Use BiPAP when necessary for distress.  4/18 wean O2 as tolerated keeps it above 90%.  Please note patient does not have oxygen at home.  BiPAP if necessary.  4/19 wean O2 as tolerated.  Keep sat above 90%  4/20 same.         CAP (community acquired pneumonia)    Continue IV Rocephin and azithromycin.  Follow on blood culture and urine culture.  Repeat chest x-ray in a.m.  4/18 cultures negative.  Continue Rocephin and azithromycin.  Patient afebrile today.  4/19 continue IV Rocephin and azithromycin.chest x-ray in a.m.  4/20 Finish abx . Need follow up CXR as OP in 4-5 weeks.         Acute on chronic renal failure    Monitor I's and O's.  Monitor BMP.  4/20 creat improved. Monitor I's and O's.  Monitor BMP.          Please recall lino Cheek MD  Pulmonology  [facility]

## 2018-04-20 NOTE — PROGRESS NOTES
Pt DC to skill [facility] and report given to AMRITA Mcgee. No question asked. IV removed pt tolerated well, catheter intact. Pt sent via wheelchair with [facility] transporter. Pt show no s/s of distress noted. O2 attached. AVS faxed and send via envelope.

## 2018-04-20 NOTE — SUBJECTIVE & OBJECTIVE
Interval History:   Review of Systems   Constitutional: Negative for fever.        No fever today   HENT: Negative for nosebleeds.    Eyes: Negative for redness.   Respiratory: Negative for cough, hemoptysis and shortness of breath.    Cardiovascular: Positive for leg swelling. Negative for chest pain.   Gastrointestinal: Negative for abdominal pain.   Genitourinary: Negative for hematuria.   Musculoskeletal: Negative for falls.   Skin: Positive for rash.   Neurological: Negative for seizures and loss of consciousness.   Endo/Heme/Allergies: Bruises/bleeds easily.   Psychiatric/Behavioral: Negative for suicidal ideas.       Objective:     Vital Signs (Most Recent):  Temp: 96.3 °F (35.7 °C) (04/20/18 1153)  Pulse: 63 (04/20/18 1153)  Resp: 18 (04/20/18 1153)  BP: (!) 141/65 (04/20/18 1153)  SpO2: 96 % (04/20/18 1153) Vital Signs (24h Range):  Temp:  [96.3 °F (35.7 °C)-98.5 °F (36.9 °C)] 96.3 °F (35.7 °C)  Pulse:  [60-69] 63  Resp:  [18-26] 18  SpO2:  [92 %-97 %] 96 %  BP: (141-164)/(65-90) 141/65     Weight: 87 kg (191 lb 12.8 oz)  Body mass index is 32.92 kg/m².      Intake/Output Summary (Last 24 hours) at 04/20/18 1227  Last data filed at 04/20/18 0457   Gross per 24 hour   Intake              510 ml   Output             2901 ml   Net            -2391 ml       Physical Exam   Constitutional: She appears well-developed and well-nourished.   HENT:   Head: Atraumatic.   Eyes: EOM are normal.   Neck: Neck supple.   Cardiovascular: Normal rate.    Pulmonary/Chest: Effort normal. No respiratory distress. She has no wheezes. She has rales. She exhibits no tenderness.   Abdominal: Soft. There is no tenderness.   Genitourinary:   Genitourinary Comments: Jama in place   Musculoskeletal: She exhibits edema.   Trace LE edema    Neurological: She is alert.   Oriented to place.   Skin: Skin is warm. Rash noted.   Bruises over upper and lower extremities.   Psychiatric:   Not agitated       Vents:       Lines/Drains/Airways      Peripheral Intravenous Line                 Peripheral IV - Single Lumen 04/17/18 0323 Right Antecubital 3 days                Significant Labs:    CBC/Anemia Profile:    Recent Labs  Lab 04/19/18  0609 04/20/18  0546   WBC 9.45 9.58   HGB 9.2* 9.4*   HCT 26.8* 27.4*    237   * 105*   RDW 14.7* 14.6*        Chemistries:    Recent Labs  Lab 04/19/18  0609 04/20/18  0546    136   K 3.1* 2.9*   CL 98 95   CO2 24 30*   BUN 58* 51*   CREATININE 1.8* 1.5*   CALCIUM 8.7 8.9   ALBUMIN 2.3* 2.3*   MG 1.4* 1.7   PHOS 3.6  3.6 3.3  3.3     Echo :      1 - Severe left atrial enlargement.     2 - Concentric hypertrophy.     3 - No wall motion abnormalities.     4 - Low normal to mildly depressed left ventricular systolic function (EF 50-55%).     5 - Impaired LV relaxation, elevated LAP (grade 2 diastolic dysfunction).     6 - Low normal to mildly depressed right ventricular systolic function .     7 - Pulmonary hypertension. The estimated PA systolic pressure is 83 mmHg.     8 - Low flow, low gradient aortic valve stenosis with preserved EF ((ELIZABETH 0.61 cm2, AVAi 0.31 cm2/m2, peak aortic jet velocity 4.0 m/s,MG 37 mmHg, EF 50%,SVi 30 mL/m2).     9 - Mild aortic regurgitation.     10 - Mild to moderate mitral regurgitation.     11 - Mild to moderate tricuspid regurgitation.     12 - Intermediate central venous pressure.     13 - Right pleural effusion.   Significant Imaging:    BNP 3526 on 4/17   CXR 4/20 PERSONALLY REVIEWED , STABLE ALVEOLAR AND INTERSTITIAL DENSITIES .

## 2018-04-20 NOTE — PLAN OF CARE
Problem: Patient Care Overview  Goal: Plan of Care Review  PT REQUIRES MOD A FOR T/F TO CHAIR WITH RW    Outcome: Ongoing (interventions implemented as appropriate)  Plan of care discussed with patient AAOX1 Vital signs stable afebrile free from falls bed alarm in use. On 3 l nc sat 95% sat 89 on ra very confused about situation time place slept quietly throughout the night will cont to monitor.

## 2018-04-20 NOTE — PLAN OF CARE
04/20/18 1000   Medicare Message   Important Message from Medicare regarding Discharge Appeal Rights Given to patient/caregiver;Explained to patient/caregiver   Date IMM was signed 04/20/18   Time IMM was signed 1000

## 2018-04-20 NOTE — ASSESSMENT & PLAN NOTE
-Peaked at 0.241, now trending down  -Elevated but flat, secondary to demand ischemia from pneumonia, volume overload, and underlying renal failure  -Continue home medications-ASA, BB, Zetia  -2D echo showed EF of 50-55%, DD, severe AS, pulmonary HTN  -Would benefit from stress test(will need to discuss the issue of tavr with family at a later date)

## 2018-04-20 NOTE — ASSESSMENT & PLAN NOTE
O2 keep sat above 90%.  Use BiPAP when necessary for distress.  4/18 wean O2 as tolerated keeps it above 90%.  Please note patient does not have oxygen at home.  BiPAP if necessary.  4/19 wean O2 as tolerated.  Keep sat above 90%  4/20 same.

## 2018-04-20 NOTE — PROGRESS NOTES
[facility]  Cardiology  Progress Note    Patient Name: Diana Turner  MRN: [id number]  Admission Date: 4/17/2018  Hospital Length of Stay: 3 days  Code Status: Full Code   Attending Physician: Tam Ferrer MD   Primary Care Physician: Cierra Woods DO  Expected Discharge Date:   Principal Problem:Acute on chronic diastolic congestive heart failure    Subjective:   HPI:  Ms. Turner is an 84 year old female patient with a PMHx of SSS s/p PPM, AI/AS, diastolic CHF, HTN, hyperlipidemia, and carotid artery disease who presented to [facility] ED yesterday with a chief complaint of progressively worsening SOB over the past week. Associated symptoms included leg swelling, orthopnea, and PND. Patient denied any associated chest pain, palpitations, near syncope, or syncope. EMS reported O2 sat of 84% upon arrival to home. Initial workup in ED revealed +fever (101), creatinine of 2.1, troponin of 0.375, +procalcitonin (1.58), lactic acidosis (2.4), and BNP of 3,526. CXR showed right lung field infiltrate as well as vascular congestion and patient subsequently admitted for further evaluation and treatment. Cardiology consulted to assist with management. Patient seen and examined today, sitting up in bed. HPI limited due to dementia. No real complaints. States she is feeling better. Admits she forgot her meds over the past week. Chart reviewed. Troponin elevated but flat 0.375>0.385. BC pending. 2D echo pending.  Hospital Course:   4/18/18-Patient seen and examined today. Feels better, SOB improved. No chest pain. Labs reviewed. Troponin peaked at .241, now trending down. Creatinine 2.1. 2D echo reviewed and showed EF of 50-55%, DD, severe AS, pulmonary HTN.    4/19/18-Patient seen and examined today. Complains of feeling weak and tired. Reports poor appetite. No chest pain. SOB improving but audible wheezes noted. Labs reviewed. Creatinine 1.8.    4/20/2018 feels sleepy and tired. Wheezes still noted.      Interval History: still feels weak and has cough.less short of breath    Review of Systems   Constitution: Positive for weakness and malaise/fatigue. Negative for diaphoresis and weight gain.   HENT: Negative for hoarse voice.    Eyes: Negative for double vision and visual disturbance.   Cardiovascular: Negative for chest pain, claudication, cyanosis, dyspnea on exertion, irregular heartbeat, leg swelling, near-syncope, orthopnea, palpitations, paroxysmal nocturnal dyspnea and syncope.   Respiratory: Positive for cough and wheezing. Negative for hemoptysis, shortness of breath and snoring.    Hematologic/Lymphatic: Negative for bleeding problem. Does not bruise/bleed easily.   Skin: Negative for color change and poor wound healing.   Musculoskeletal: Negative for muscle cramps, muscle weakness and myalgias.   Gastrointestinal: Negative for bloating, abdominal pain, change in bowel habit, diarrhea, heartburn, hematemesis, hematochezia, melena and nausea.   Neurological: Negative for excessive daytime sleepiness, dizziness, headaches, light-headedness, loss of balance and numbness.   Psychiatric/Behavioral: Negative for memory loss. The patient does not have insomnia.    Allergic/Immunologic: Negative for hives.     Objective:     Vital Signs (Most Recent):  Temp: 97.6 °F (36.4 °C) (04/20/18 0757)  Pulse: 69 (04/20/18 0757)  Resp: 20 (04/20/18 0757)  BP: (!) 161/71 (04/20/18 0757)  SpO2: 97 % (04/20/18 0859) Vital Signs (24h Range):  Temp:  [97.6 °F (36.4 °C)-98.5 °F (36.9 °C)] 97.6 °F (36.4 °C)  Pulse:  [60-69] 69  Resp:  [18-26] 20  SpO2:  [92 %-97 %] 97 %  BP: (151-164)/(65-90) 161/71     Weight: 87 kg (191 lb 12.8 oz)  Body mass index is 32.92 kg/m².     SpO2: 97 %  O2 Device (Oxygen Therapy): nasal cannula      Intake/Output Summary (Last 24 hours) at 04/20/18 1024  Last data filed at 04/20/18 0457   Gross per 24 hour   Intake              630 ml   Output             2901 ml   Net            -2271 ml        Lines/Drains/Airways     Peripheral Intravenous Line                 Peripheral IV - Single Lumen 04/17/18 0323 Right Antecubital 3 days                Physical Exam   Constitutional: She is oriented to person, place, and time. She appears well-developed and well-nourished. She does not appear ill. No distress.   HENT:   Head: Normocephalic and atraumatic.   Eyes: EOM are normal. Pupils are equal, round, and reactive to light. No scleral icterus.   Neck: Normal range of motion. Neck supple. Normal carotid pulses, no hepatojugular reflux and no JVD present. Carotid bruit is not present. No tracheal deviation present. No thyromegaly present.   Cardiovascular: Normal rate, regular rhythm, intact distal pulses and normal pulses.  Exam reveals no gallop and no friction rub.    Murmur heard.   Harsh midsystolic murmur is present with a grade of 2/6  at the upper right sternal border radiating to the neck  Pulmonary/Chest: Effort normal and breath sounds normal. No respiratory distress. She has no wheezes. She has no rhonchi. She has no rales. She exhibits no tenderness.   Abdominal: Soft. Normal appearance, normal aorta and bowel sounds are normal. She exhibits no distension, no abdominal bruit, no ascites and no pulsatile midline mass. There is no hepatomegaly. There is no tenderness.   Obese abdomen   Musculoskeletal: She exhibits no edema.        Right shoulder: She exhibits no deformity.   Neurological: She is alert and oriented to person, place, and time. She has normal strength. No cranial nerve deficit. Coordination normal.   Skin: Skin is warm and dry. No rash noted. She is not diaphoretic. No cyanosis or erythema. Nails show no clubbing.   Spider veins noted.   Psychiatric: She has a normal mood and affect. Her speech is normal and behavior is normal.   Nursing note and vitals reviewed.      Significant Labs:   Recent Lab Results       04/20/18  0546 04/19/18  2357 04/19/18  1849 04/19/18  1128      Albumin  2.3(L)        Anion Gap 11        Baso # 0.01        Basophil% 0.1        BNP >4,900  Comment:  Values of less than 100 pg/ml are consistent with non-CHF populations.(H)        BUN, Bld 51(H)        Calcium 8.9        Chloride 95        CO2 30(H)        Creatinine 1.5(H)        Differential Method Automated        eGFR if  37(A)        eGFR if non  32  Comment:  Calculation used to obtain the estimated glomerular filtration  rate (eGFR) is the CKD-EPI equation.   (A)        Eos # 0.1        Eosinophil% 0.7        Glucose 130(H)        Gran # (ANC) 7.6        Gran% 79.2(H)        Hematocrit 27.4(L)        Hemoglobin 9.4(L)        Lymph # 0.7(L)        Lymph% 7.5(L)        Magnesium 1.7        MCH 36.2(H)        MCHC 34.3        (H)        Mono # 1.2(H)        Mono% 12.5        MPV 10.9        Phosphorus 3.3         3.3        Platelets 237        Potassium 2.9(L)        RBC 2.60(L)        RDW 14.6(H)        Sodium 136        Troponin I 0.112  Comment:  The reference interval for Troponin I represents the 99th percentile   cutoff   for our facility and is consistent with 3rd generation assay   performance.  (H) 0.122  Comment:  The reference interval for Troponin I represents the 99th percentile   cutoff   for our facility and is consistent with 3rd generation assay   performance.  (H) 0.107  Comment:  The reference interval for Troponin I represents the 99th percentile   cutoff   for our facility and is consistent with 3rd generation assay   performance.  (H) 0.116  Comment:  The reference interval for Troponin I represents the 99th percentile   cutoff   for our facility and is consistent with 3rd generation assay   performance.  (H)     WBC 9.58              Significant Imaging: X-Ray: CXR: X-Ray Chest 1 View (CXR):   Results for orders placed or performed during the hospital encounter of 04/17/18   X-Ray Chest 1 View    Narrative    History: Respiratory failure    Comparison:  4/18/18    Result: Single view of the chest.       In comparison to the prior study, there is no adverse interval changes.    Impression     No adverse interval change      Electronically signed by: ELROY GOLDSMITH MD  Date:     04/20/18  Time:    08:35      Assessment and Plan:     Brief HPI: AN 85 YO FEMALE WITH SEVERE AS PREENTS WITH RT SIDED PNEUMONIA CHF DUE TO NON COMPLIANCE AND DEMAND ISCHEMIA . SHE IS IMPROVING CLINICALLY. HER AS NEEDS TO BE ADDRESSED AS OUTPATIENT IF SHE IS AGREEABLE. HER HTN UNCONTROLLED HYDRALAZINE ADDED.    * Acute on chronic diastolic congestive heart failure    -Decompensated, secondary to pneumonia and medication non-compliance  -Severe AS also contributing   -Improved with IV diuresis  -Resume po meds tmw  -Continue ASA, BB  -No ACEI/ARB given renal function add hydralazine         Severe aortic stenosis    -ELIZABETH 0.6 on 2D echo  -Will need TAVR workup as OP if agreeable        CAP (community acquired pneumonia)    -Mgmt as per primary team; on abx        Acute on chronic renal failure    -Creatinine improving probably from volume contraction.        Elevated troponin    -Peaked at 0.241, now trending down  -Elevated but flat, secondary to demand ischemia from pneumonia, volume overload, and underlying renal failure  -Continue home medications-ASA, BB, Zetia  -2D echo showed EF of 50-55%, DD, severe AS, pulmonary HTN  -Would benefit from stress test(will need to discuss the issue of tavr with family at a later date)        Benign hypertensive heart disease with heart failure    -Continue ASA, atenolol, Zetia, Cardizem  Add hydralazine            VTE Risk Mitigation         Ordered     heparin (porcine) injection 5,000 Units  Every 8 hours      04/17/18 0447     IP VTE HIGH RISK PATIENT  Once      04/17/18 0447     Place sequential compression device  Until discontinued      04/17/18 0442          Bronson Browne MD  Cardiology  [facility]

## 2018-04-20 NOTE — ASSESSMENT & PLAN NOTE
Continue IV Rocephin and azithromycin.  Follow on blood culture and urine culture.  Repeat chest x-ray in a.m.  4/18 cultures negative.  Continue Rocephin and azithromycin.  Patient afebrile today.  4/19 continue IV Rocephin and azithromycin.chest x-ray in a.m.  4/20 Finish abx . Need follow up CXR as OP in 4-5 weeks.

## 2018-04-20 NOTE — PLAN OF CARE
"Patient d/c to [facility]. RN to call report to [phone] " B " Granados.     04/20/18 9852   Final Note   Assessment Type Final Discharge Note   Discharge Disposition SNF   Hospital Follow Up  Appt(s) scheduled? Yes   Discharge plans and expectations educations in teach back method with documentation complete? Yes   Right Care Referral Info   Post Acute Recommendation SNF / Sub-Acute Rehab     "

## 2018-04-20 NOTE — PLAN OF CARE
Problem: Physical Therapy Goal  Goal: Physical Therapy Goal  PT WILL BE SEEN FOR P.T. FOR A MIN OF 5 OUT OF 7 DAYS A WEEK  LT18  1. PT WILL COMPLETE BED MOBILITY WITH MIN A  2. PT WILL T/F TO CHAIR WITH MIN A  3. PT WILL GT TRAIN X 50' WITH RW AND MIN A  4. PT WILL  COMPLETE B LE TE X 20 REPS FOR STRENGTHENING   Outcome: Ongoing (interventions implemented as appropriate)  PT REQUIRED MODA X1 FOR BED MOBILITY AND TRANSFERS WITH RW,  STOOD FOR CLEAN UP AND DIAPER DONNED IN STANDING.

## 2018-04-20 NOTE — ASSESSMENT & PLAN NOTE
-Decompensated, secondary to pneumonia and medication non-compliance  -Severe AS also contributing   -Improved with IV diuresis  -Resume po meds tmw  -Continue ASA, BB  -No ACEI/ARB given renal function add hydralazine

## 2018-04-21 NOTE — DISCHARGE SUMMARY
[facility] Hospital Medicine  Discharge Summary      Patient Name: Diana Turner  MRN: [id number]  Admission Date: 4/17/2018  Hospital Length of Stay: 3 days  Discharge Date and Time: 4/20/2018  2:11 PM  Attending Physician:  Ramsey Ferrer MD  Discharging Provider: Ramsey Ferrer MD  Primary Care Provider: Cierra Woods,       HPI:   84F h/o HFpEF, HTN, SSS s/p PPM, presents with SOB x 1 week.  Was transported by EMS to ER .   Per EMS, patient SpO2 on scene was 84% with RR 45.  Patient was placed on CPAP on scene.  Associated with ZELAYA, PND and orthopnea.  No other exacerbating or alleviating factors.  Patient had not been compliant on  her medications for the last week.  She saw cardiology (veronica) yesterday who advised patient to be compliant on medications, especially lasix 40mg PO BID.   Patient was placed on Bipap FiO2 100% with sat 97% in Er.  Tmax 101F.   CXR show patchy bilateral infiltrates, cardiomegaly.  BNP>3000 trop 0.345.   Lasix 40mg IV x 1 given in Er.    Blood cx x 2 and Urine cx collected.   Given CTX 2g x 1. Hospital medicine called for admission.         * No surgery found *      Hospital Course:   Admitted for treatment of heart failure exacerbation and pneumonia.  Empirically started Ceftriaxone and Azithromycin.  Also treated with IV Furosemide.  Diuresis improved shortness of breath.  PT/OT evaluation and case management conference with her daughter about care goals.  Overall improvement but continued cough.  Transfer tot Telemetry 18 April.  Requesting placement with skilled nursing.  Discharge to [facility].     Consults:   Consults         Status Ordering Provider     Inpatient consult to Cardiology  Once     Provider:  Woody Browne MD    Completed BUDDY MAI     Inpatient consult to Registered Dietitian/Nutritionist  Once     Provider:  (Not yet assigned)    Completed RAMSEY FERRER          No new Assessment & Plan notes have been filed under  this hospital service since the last note was generated.  Service: Hospital Medicine    Final Active Diagnoses:    Diagnosis Date Noted POA    PRINCIPAL PROBLEM:  Acute on chronic diastolic congestive heart failure [I50.33] 04/16/2018 Yes    Hypomagnesemia [E83.42] 04/19/2018 Yes    Acute exacerbation of CHF (congestive heart failure) [I50.9] 04/18/2018 Yes    Pulmonary hypertension [I27.20] 04/18/2018 Unknown    Severe aortic stenosis [I35.0] 04/18/2018 Unknown    Acute on chronic renal failure [N17.9, N18.9] 04/17/2018 Yes    CAP (community acquired pneumonia) [J18.9] 04/17/2018 Yes    SA node dysfunction [I49.5] 04/17/2018 Yes    Acute respiratory failure [J96.00] 04/17/2018 Yes    Elevated troponin [R74.8] 09/15/2016 Yes    Benign hypertensive heart disease with heart failure [I11.0]  Yes      Problems Resolved During this Admission:    Diagnosis Date Noted Date Resolved POA       Discharged Condition: fair    Disposition: Skilled Nursing Facility    Follow Up:    Patient Instructions:   No discharge procedures on file.    Significant Diagnostic Studies: Labs: All labs within the past 24 hours have been reviewed    Pending Diagnostic Studies:     None         Medications:  Reconciled Home Medications:      Medication List      CONTINUE taking these medications    acetaminophen 500 MG tablet  Commonly known as:  TYLENOL  Take 500 mg by mouth every 6 (six) hours as needed.     allopurinol 100 MG tablet  Commonly known as:  ZYLOPRIM  TAKE 2 TABLETS BY MOUTH ONCE DAILY.     aspirin 81 mg Cpdr  Take 1 tablet by mouth Daily.     atenolol 100 MG tablet  Commonly known as:  TENORMIN  TAKE 1 TABLET BY MOUTH EVERY DAY     calcitRIOL 0.25 MCG Cap  Commonly known as:  ROCALTROL  TAKE 1 CAPSULE (0.25 MCG TOTAL) BY MOUTH ONCE DAILY.     citalopram 40 MG tablet  Commonly known as:  CELEXA  Take 1 tablet (40 mg total) by mouth once daily.     CLARISPRAY NASL  by Nasal route.     diltiaZEM 240 MG 24 hr  capsule  Commonly known as:  CARDIZEM CD  TAKE 1 CAPSULE BY MOUTH ONCE DAILY.     FISH OIL 1,000 mg Cap  Generic drug:  omega-3 fatty acids-vitamin E  Take 1 capsule by mouth Twice daily.     furosemide 40 MG tablet  Commonly known as:  LASIX  TAKE 1 TABLET (40 MG TOTAL) BY MOUTH ONCE DAILY.     meclizine 25 mg tablet  Commonly known as:  ANTIVERT  TAKE 1 TABLET (25 MG TOTAL) BY MOUTH EVERY EVENING.     senna 8.6 mg tablet  Commonly known as:  SENOKOT  Take 1 tablet by mouth once daily.     ZETIA 10 mg tablet  Generic drug:  ezetimibe  TAKE 1 TABLET BY MOUTH EVERY DAY            Indwelling Lines/Drains at time of discharge:   Lines/Drains/Airways          No matching active lines, drains, or airways          Time spent on the discharge of patient: 30 minutes  Patient was seen and examined on the date of discharge and determined to be suitable for discharge.         Tam Ferrer MD  Department of Hospital Medicine  [facility]

## 2018-04-22 LAB
BACTERIA BLD CULT: NORMAL
BACTERIA BLD CULT: NORMAL

## 2018-04-23 ENCOUNTER — PATIENT OUTREACH (OUTPATIENT)
Dept: ADMINISTRATIVE | Facility: CLINIC | Age: 83
End: 2018-04-23

## 2018-04-23 ENCOUNTER — PATIENT MESSAGE (OUTPATIENT)
Dept: CARDIOLOGY | Facility: CLINIC | Age: 83
End: 2018-04-23

## 2018-04-25 ENCOUNTER — TELEPHONE (OUTPATIENT)
Dept: CARDIOLOGY | Facility: CLINIC | Age: 83
End: 2018-04-25

## 2018-04-25 NOTE — TELEPHONE ENCOUNTER
----- Message from Marium Mcclellan PA-C sent at 4/25/2018  3:21 PM CDT -----  Needs hosp f/u this week or next

## 2018-04-25 NOTE — TELEPHONE ENCOUNTER
Spoke with pt's daughter Yuni states pt is currently in skills nursing and states she will be leaving to go out of town next week and will be gone for the next 3 weeks. Pt daughter states she will give us a call back once she figures out how to get pt to her appts.

## 2018-08-21 ENCOUNTER — PATIENT MESSAGE (OUTPATIENT)
Dept: ADMINISTRATIVE | Facility: OTHER | Age: 83
End: 2018-08-21

## 2018-08-30 ENCOUNTER — CLINICAL SUPPORT (OUTPATIENT)
Dept: CARDIOLOGY | Facility: CLINIC | Age: 83
End: 2018-08-30
Attending: INTERNAL MEDICINE
Payer: MEDICARE

## 2018-08-30 VITALS
HEART RATE: 61 BPM | BODY MASS INDEX: 32.92 KG/M2 | HEIGHT: 64 IN | SYSTOLIC BLOOD PRESSURE: 134 MMHG | DIASTOLIC BLOOD PRESSURE: 50 MMHG

## 2018-08-30 DIAGNOSIS — I35.9 AORTIC VALVE DISORDER: Primary | ICD-10-CM

## 2018-08-30 DIAGNOSIS — I77.9 CAROTID ARTERY DISEASE, UNSPECIFIED LATERALITY: ICD-10-CM

## 2018-08-30 DIAGNOSIS — N18.30 CKD (CHRONIC KIDNEY DISEASE) STAGE 3, GFR 30-59 ML/MIN: ICD-10-CM

## 2018-08-30 DIAGNOSIS — I50.33 ACUTE ON CHRONIC DIASTOLIC CONGESTIVE HEART FAILURE: ICD-10-CM

## 2018-08-30 DIAGNOSIS — I35.0 SEVERE AORTIC STENOSIS: ICD-10-CM

## 2018-08-30 DIAGNOSIS — I11.0 BENIGN HYPERTENSIVE HEART DISEASE WITH HEART FAILURE: ICD-10-CM

## 2018-08-30 DIAGNOSIS — Z95.0 CARDIAC PACEMAKER IN SITU: ICD-10-CM

## 2018-08-30 DIAGNOSIS — I45.10 RIGHT BUNDLE BRANCH BLOCK: ICD-10-CM

## 2018-08-30 DIAGNOSIS — N18.9 ANEMIA ASSOCIATED WITH CHRONIC RENAL FAILURE: ICD-10-CM

## 2018-08-30 DIAGNOSIS — E78.5 HYPERLIPIDEMIA, UNSPECIFIED HYPERLIPIDEMIA TYPE: ICD-10-CM

## 2018-08-30 DIAGNOSIS — D63.1 ANEMIA ASSOCIATED WITH CHRONIC RENAL FAILURE: ICD-10-CM

## 2018-08-30 DIAGNOSIS — Z95.0 CARDIAC PACEMAKER: ICD-10-CM

## 2018-08-30 DIAGNOSIS — I10 ESSENTIAL HYPERTENSION: ICD-10-CM

## 2018-08-30 DIAGNOSIS — I49.5 SINOATRIAL NODE DYSFUNCTION: ICD-10-CM

## 2018-08-30 DIAGNOSIS — Z86.79 HISTORY OF PULMONARY HYPERTENSION: ICD-10-CM

## 2018-08-30 DIAGNOSIS — Z86.79 HISTORY OF SICK SINUS SYNDROME: ICD-10-CM

## 2018-08-30 PROBLEM — E83.42 HYPOMAGNESEMIA: Status: RESOLVED | Noted: 2018-04-19 | Resolved: 2018-08-30

## 2018-08-30 PROCEDURE — 99999 PR PBB SHADOW E&M-EST. PATIENT-LVL III: CPT | Mod: PBBFAC,,, | Performed by: INTERNAL MEDICINE

## 2018-08-30 PROCEDURE — 3078F DIAST BP <80 MM HG: CPT | Mod: CPTII,S$GLB,, | Performed by: INTERNAL MEDICINE

## 2018-08-30 PROCEDURE — 3075F SYST BP GE 130 - 139MM HG: CPT | Mod: CPTII,S$GLB,, | Performed by: INTERNAL MEDICINE

## 2018-08-30 PROCEDURE — 99214 OFFICE O/P EST MOD 30 MIN: CPT | Mod: S$GLB,,, | Performed by: INTERNAL MEDICINE

## 2018-08-30 RX ORDER — TRAZODONE HYDROCHLORIDE 50 MG/1
50 TABLET ORAL NIGHTLY
COMMUNITY

## 2018-08-30 NOTE — PROGRESS NOTES
Subjective:   Patient ID:  Diana Turner is a 84 y.o. female who presents for follow up of Carotid Artery Disease; Hypertension; and Hyperlipidemia      HPI  An 85 yo female with h/o aovalve disorder s/p pacer chf is a nursing home resident now due to her dementia. She has no chest pain or shortness of breath she has been having dependent leg swelling due to he rsitting in chair she is demented and has no other issues to report per her daughter who was with her today.  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.11)     Cardiac pacemaker 10/10/2013    Carotid artery occlusion     Chronic renal insufficiency, stage III (moderate) 10/10/2013    Depression     History of hyperparathyroidism     Hyperlipidemia     Hypertension     Hypomagnesemia 4/19/2018    Kidney cysts     right US renal artery 9/2013    Obesity     Orbital mass     Pneumonia     as a child, required hospitalization    Right bundle branch block     Sinoatrial node dysfunction     Thyroid disease     goiter    Urinary incontinence        Past Surgical History:   Procedure Laterality Date    ADENOIDECTOMY      APPENDECTOMY      CHOLECYSTECTOMY      HEMORRHOID SURGERY      HYSTERECTOMY      RAHUL; benign reasons    INSERT / REPLACE / REMOVE PACEMAKER  09/25/2008    [facility]    KNEE SURGERY      right    TONSILLECTOMY         Social History     Tobacco Use    Smoking status: Never Smoker    Smokeless tobacco: Never Used   Substance Use Topics    Alcohol use: No    Drug use: No       Family History   Problem Relation Age of Onset    Hypertension Mother     Kidney disease Mother     Heart disease Brother     Hyperlipidemia Brother     Hypertension Brother     Diabetes Cousin     Stroke Maternal Grandmother     Cancer Neg Hx     COPD Neg Hx        Current Outpatient  Medications   Medication Sig    acetaminophen (TYLENOL) 500 MG tablet Take 500 mg by mouth every 6 (six) hours as needed.    allopurinol (ZYLOPRIM) 100 MG tablet TAKE 2 TABLETS BY MOUTH ONCE DAILY.    aspirin 81 mg CpDR Take 1 tablet by mouth Daily.    atenolol (TENORMIN) 100 MG tablet TAKE 1 TABLET BY MOUTH EVERY DAY    calcitRIOL (ROCALTROL) 0.25 MCG Cap TAKE 1 CAPSULE (0.25 MCG TOTAL) BY MOUTH ONCE DAILY.    diltiaZEM (CARDIZEM CD) 240 MG 24 hr capsule TAKE 1 CAPSULE BY MOUTH ONCE DAILY.    furosemide (LASIX) 40 MG tablet TAKE 1 TABLET (40 MG TOTAL) BY MOUTH ONCE DAILY.    meclizine (ANTIVERT) 25 mg tablet TAKE 1 TABLET (25 MG TOTAL) BY MOUTH EVERY EVENING. (Patient taking differently: TAKE 1 TABLET (25 MG TOTAL) BY MOUTH EVERY EVENING PRN)    senna (SENOKOT) 8.6 mg tablet Take 1 tablet by mouth once daily.    traZODone (DESYREL) 50 MG tablet Take 50 mg by mouth every evening.     Current Facility-Administered Medications   Medication    diphenhydrAMINE capsule 25 mg    ferumoxytol (FERAHEME) 510 mg in dextrose 5 % 100 mL IVPB    sodium chloride 0.9% flush 10 mL     Current Outpatient Medications on File Prior to Visit   Medication Sig    acetaminophen (TYLENOL) 500 MG tablet Take 500 mg by mouth every 6 (six) hours as needed.    allopurinol (ZYLOPRIM) 100 MG tablet TAKE 2 TABLETS BY MOUTH ONCE DAILY.    aspirin 81 mg CpDR Take 1 tablet by mouth Daily.    atenolol (TENORMIN) 100 MG tablet TAKE 1 TABLET BY MOUTH EVERY DAY    calcitRIOL (ROCALTROL) 0.25 MCG Cap TAKE 1 CAPSULE (0.25 MCG TOTAL) BY MOUTH ONCE DAILY.    diltiaZEM (CARDIZEM CD) 240 MG 24 hr capsule TAKE 1 CAPSULE BY MOUTH ONCE DAILY.    furosemide (LASIX) 40 MG tablet TAKE 1 TABLET (40 MG TOTAL) BY MOUTH ONCE DAILY.    meclizine (ANTIVERT) 25 mg tablet TAKE 1 TABLET (25 MG TOTAL) BY MOUTH EVERY EVENING. (Patient taking differently: TAKE 1 TABLET (25 MG TOTAL) BY MOUTH EVERY EVENING PRN)    senna (SENOKOT) 8.6 mg tablet Take 1  tablet by mouth once daily.    traZODone (DESYREL) 50 MG tablet Take 50 mg by mouth every evening.    [DISCONTINUED] citalopram (CELEXA) 40 MG tablet Take 1 tablet (40 mg total) by mouth once daily.    [DISCONTINUED] FLUTICASONE PROPIONATE (CLARISPRAY NASL) by Nasal route.    [DISCONTINUED] omega-3 fatty acids-vitamin E (FISH OIL) 1,000 mg Cap Take 1 capsule by mouth Twice daily.    [DISCONTINUED] ZETIA 10 mg tablet TAKE 1 TABLET BY MOUTH EVERY DAY     Current Facility-Administered Medications on File Prior to Visit   Medication    diphenhydrAMINE capsule 25 mg    ferumoxytol (FERAHEME) 510 mg in dextrose 5 % 100 mL IVPB    sodium chloride 0.9% flush 10 mL    [DISCONTINUED] heparin, porcine (PF) 100 unit/mL injection flush 500 Units       Review of Systems   Constitution: Negative for diaphoresis, weakness, malaise/fatigue and weight gain.   HENT: Negative for hoarse voice.    Eyes: Negative for double vision and visual disturbance.   Cardiovascular: Positive for leg swelling. Negative for chest pain, claudication, cyanosis, dyspnea on exertion, irregular heartbeat, near-syncope, orthopnea, palpitations, paroxysmal nocturnal dyspnea and syncope.   Respiratory: Negative for cough, hemoptysis, shortness of breath and snoring.    Hematologic/Lymphatic: Negative for bleeding problem. Does not bruise/bleed easily.   Skin: Negative for color change and poor wound healing.   Musculoskeletal: Positive for falls. Negative for muscle cramps, muscle weakness and myalgias.   Gastrointestinal: Negative for bloating, abdominal pain, change in bowel habit, diarrhea, heartburn, hematemesis, hematochezia, melena and nausea.   Neurological: Negative for excessive daytime sleepiness, dizziness, headaches, light-headedness, loss of balance and numbness.   Psychiatric/Behavioral: Positive for memory loss. The patient does not have insomnia.    Allergic/Immunologic: Negative for hives.       Objective:   Physical Exam    Constitutional: She is oriented to person, place, and time. She appears well-developed and well-nourished. She does not appear ill. No distress.   HENT:   Head: Normocephalic and atraumatic.   Eyes: EOM are normal. Pupils are equal, round, and reactive to light. No scleral icterus.   Neck: Normal range of motion. Neck supple. Normal carotid pulses, no hepatojugular reflux and no JVD present. Carotid bruit is not present. No tracheal deviation present. No thyromegaly present.   Cardiovascular: Normal rate, regular rhythm, S1 normal, intact distal pulses and normal pulses. Exam reveals no gallop and no friction rub.   Murmur heard.   Harsh midsystolic murmur is present with a grade of 2/6 at the upper right sternal border radiating to the neck.  Pulses:       Carotid pulses are 2+ on the right side, and 2+ on the left side.       Radial pulses are 2+ on the right side, and 2+ on the left side.        Femoral pulses are 2+ on the right side, and 2+ on the left side.       Popliteal pulses are 2+ on the right side, and 2+ on the left side.        Dorsalis pedis pulses are 2+ on the right side, and 2+ on the left side.        Posterior tibial pulses are 2+ on the right side, and 2+ on the left side.   Decreased second sounds.   Pulmonary/Chest: Effort normal and breath sounds normal. No respiratory distress. She has no wheezes. She has no rhonchi. She has no rales. She exhibits no tenderness.   Pacer site well healed.   Abdominal: Soft. Normal appearance, normal aorta and bowel sounds are normal. She exhibits no abdominal bruit, no ascites and no pulsatile midline mass. There is no hepatomegaly. There is no tenderness.   Musculoskeletal: She exhibits edema (trace bilatyeral with rubor.).        Right shoulder: She exhibits no deformity.   Neurological: She is alert and oriented to person, place, and time. She has normal strength. No cranial nerve deficit. Coordination normal.   Skin: Skin is warm and dry. No rash  "noted. She is not diaphoretic. No cyanosis or erythema. Nails show no clubbing.   Multiple bruises noted.   Psychiatric: She has a normal mood and affect. Her speech is normal and behavior is normal.   Nursing note and vitals reviewed.    Vitals:    08/30/18 1409   BP: (!) 134/50   Pulse: 61   Height: 5' 4" (1.626 m)     Lab Results   Component Value Date    CHOL 213 (H) 07/25/2017    CHOL 179 09/11/2016    CHOL 198 07/18/2016     Lab Results   Component Value Date    HDL 41 07/25/2017    HDL 32 (L) 09/11/2016    HDL 42 07/18/2016     Lab Results   Component Value Date    LDLCALC 148.0 07/25/2017    LDLCALC 129.4 09/11/2016    LDLCALC 141.0 07/18/2016     Lab Results   Component Value Date    TRIG 120 07/25/2017    TRIG 88 09/11/2016    TRIG 75 07/18/2016     Lab Results   Component Value Date    CHOLHDL 19.2 (L) 07/25/2017    CHOLHDL 17.9 (L) 09/11/2016    CHOLHDL 21.2 07/18/2016       Chemistry        Component Value Date/Time     04/20/2018 0546    K 2.9 (L) 04/20/2018 0546    CL 95 04/20/2018 0546    CO2 30 (H) 04/20/2018 0546    BUN 51 (H) 04/20/2018 0546    CREATININE 1.5 (H) 04/20/2018 0546     (H) 04/20/2018 0546        Component Value Date/Time    CALCIUM 8.9 04/20/2018 0546    ALKPHOS 93 04/17/2018 0320    AST 37 04/17/2018 0320    ALT 24 04/17/2018 0320    BILITOT 1.5 (H) 04/17/2018 0320    ESTGFRAFRICA 37 (A) 04/20/2018 0546    EGFRNONAA 32 (A) 04/20/2018 0546          Lab Results   Component Value Date    TSH 0.507 09/11/2016     Lab Results   Component Value Date    INR 1.1 04/17/2018    INR 1.0 08/10/2017    INR 1.0 09/10/2016     Lab Results   Component Value Date    WBC 9.58 04/20/2018    HGB 9.4 (L) 04/20/2018    HCT 27.4 (L) 04/20/2018     (H) 04/20/2018     04/20/2018     BMP  Sodium   Date Value Ref Range Status   04/20/2018 136 136 - 145 mmol/L Final     Potassium   Date Value Ref Range Status   04/20/2018 2.9 (L) 3.5 - 5.1 mmol/L Final     Chloride   Date Value Ref " Range Status   04/20/2018 95 95 - 110 mmol/L Final     CO2   Date Value Ref Range Status   04/20/2018 30 (H) 23 - 29 mmol/L Final     BUN, Bld   Date Value Ref Range Status   04/20/2018 51 (H) 8 - 23 mg/dL Final     Creatinine   Date Value Ref Range Status   04/20/2018 1.5 (H) 0.5 - 1.4 mg/dL Final     Calcium   Date Value Ref Range Status   04/20/2018 8.9 8.7 - 10.5 mg/dL Final     Anion Gap   Date Value Ref Range Status   04/20/2018 11 8 - 16 mmol/L Final     eGFR if    Date Value Ref Range Status   04/20/2018 37 (A) >60 mL/min/1.73 m^2 Final     eGFR if non    Date Value Ref Range Status   04/20/2018 32 (A) >60 mL/min/1.73 m^2 Final     Comment:     Calculation used to obtain the estimated glomerular filtration  rate (eGFR) is the CKD-EPI equation.        CrCl cannot be calculated (Patient's most recent lab result is older than the maximum 7 days allowed.).    Assessment:     1. Aortic valve disorder    2. Carotid artery disease, unspecified laterality    3. Benign hypertensive heart disease with heart failure    4. Right bundle branch block    5. History of sick sinus syndrome    6. Essential hypertension    7. Hyperlipidemia, unspecified hyperlipidemia type    8. Cardiac pacemaker    9. CKD (chronic kidney disease) stage 3, GFR 30-59 ml/min    10. Anemia associated with chronic renal failure    11. History of pulmonary hypertension    12. Acute on chronic diastolic congestive heart failure    13. Severe aortic stenosis      Stable cardiac wise well compensated. She is off hyperlipidemia therapy which is ok by me.   Continue low salt diet.  Plan:   Continue current therapy  Cardiac low salt diet.  Risk factor modification and excercise program.  F/u in 6 months .

## 2018-12-04 ENCOUNTER — HOSPITAL ENCOUNTER (EMERGENCY)
Facility: HOSPITAL | Age: 83
End: 2018-12-04
Attending: EMERGENCY MEDICINE
Payer: MEDICARE

## 2018-12-04 VITALS
OXYGEN SATURATION: 98 % | DIASTOLIC BLOOD PRESSURE: 54 MMHG | RESPIRATION RATE: 16 BRPM | BODY MASS INDEX: 32.74 KG/M2 | TEMPERATURE: 98 F | SYSTOLIC BLOOD PRESSURE: 156 MMHG | HEIGHT: 64 IN | HEART RATE: 67 BPM | WEIGHT: 191.81 LBS

## 2018-12-04 DIAGNOSIS — S51.019A SKIN TEAR OF ELBOW WITHOUT COMPLICATION, INITIAL ENCOUNTER: Primary | ICD-10-CM

## 2018-12-04 DIAGNOSIS — M25.529 ELBOW PAIN: ICD-10-CM

## 2018-12-04 PROCEDURE — 90471 IMMUNIZATION ADMIN: CPT | Mod: HCNC | Performed by: NURSE PRACTITIONER

## 2018-12-04 PROCEDURE — 99283 EMERGENCY DEPT VISIT LOW MDM: CPT | Mod: 25,HCNC

## 2018-12-04 PROCEDURE — 12002 RPR S/N/AX/GEN/TRNK2.6-7.5CM: CPT | Mod: HCNC

## 2018-12-04 PROCEDURE — 90714 TD VACC NO PRESV 7 YRS+ IM: CPT | Mod: HCNC | Performed by: NURSE PRACTITIONER

## 2018-12-04 PROCEDURE — 63600175 PHARM REV CODE 636 W HCPCS: Mod: HCNC | Performed by: NURSE PRACTITIONER

## 2018-12-04 RX ADMIN — CLOSTRIDIUM TETANI TOXOID ANTIGEN (FORMALDEHYDE INACTIVATED) AND CORYNEBACTERIUM DIPHTHERIAE TOXOID ANTIGEN (FORMALDEHYDE INACTIVATED) 0.5 ML: 5; 2 INJECTION, SUSPENSION INTRAMUSCULAR at 09:12

## 2018-12-04 NOTE — ED PROVIDER NOTES
Encounter Date: 12/4/2018       History     Chief Complaint   Patient presents with    Fall     L elbow skin tear/lac     85 year old female with complaint of skin tear to left elbow since this morning.  Pt lives in nursing home and has dementia and unsure of injury.  Reported by nursing home that pt stumbled and struck left elbow and sustained skin tear.  Bleeding controlled.  No complaints from patient. Denies neck or hip pain.            Review of patient's allergies indicates:   Allergen Reactions    Pravastatin Other (See Comments)    Sulfa (sulfonamide antibiotics) Swelling and Other (See Comments)    Yeast, dried Other (See Comments)     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.11)     Cardiac pacemaker 10/10/2013    Carotid artery occlusion     Chronic renal insufficiency, stage III (moderate) 10/10/2013    Depression     History of hyperparathyroidism     Hyperlipidemia     Hypertension     Hypomagnesemia 4/19/2018    Kidney cysts     right US renal artery 9/2013    Obesity     Orbital mass     Pneumonia     as a child, required hospitalization    Right bundle branch block     Sinoatrial node dysfunction     Thyroid disease     goiter    Urinary incontinence      Past Surgical History:   Procedure Laterality Date    ADENOIDECTOMY      APPENDECTOMY      CHOLECYSTECTOMY      HEMORRHOID SURGERY      HYSTERECTOMY      RAHUL; benign reasons    INSERT / REPLACE / REMOVE PACEMAKER  09/25/2008    [facility]    KNEE SURGERY      right    REPLACEMENT-GENERATOR-PACEMAKER Left 8/21/2017    Performed by Lupillo Flaherty MD at [facility] CATH LAB    TONSILLECTOMY       Family History   Problem Relation Age of Onset    Hypertension Mother     Kidney disease Mother     Heart disease Brother     Hyperlipidemia Brother     Hypertension Brother      Diabetes Cousin     Stroke Maternal Grandmother     Cancer Neg Hx     COPD Neg Hx      Social History     Tobacco Use    Smoking status: Never Smoker    Smokeless tobacco: Never Used   Substance Use Topics    Alcohol use: No    Drug use: No     Review of Systems   Constitutional: Negative for fever.   HENT: Negative for sore throat.    Respiratory: Negative for shortness of breath.    Cardiovascular: Negative for chest pain.   Gastrointestinal: Negative for nausea.   Genitourinary: Negative for dysuria.   Musculoskeletal: Negative for back pain.   Skin: Negative for rash.        Skin tear left elbow   Neurological: Negative for weakness.   Hematological: Does not bruise/bleed easily.       Physical Exam     Initial Vitals [12/04/18 0835]   BP Pulse Resp Temp SpO2   (!) 156/54 67 16 98 °F (36.7 °C) 98 %      MAP       --         Physical Exam    Nursing note and vitals reviewed.  Constitutional: She appears well-developed and well-nourished.   HENT:   Head: Normocephalic and atraumatic.   Eyes: Conjunctivae and EOM are normal. Pupils are equal, round, and reactive to light.   Neck: Normal range of motion. Neck supple.   Cardiovascular: Normal rate, regular rhythm, normal heart sounds and intact distal pulses.   Pulmonary/Chest: Breath sounds normal.   Abdominal: Soft. There is no tenderness. There is no rebound and no guarding.   Musculoskeletal: Normal range of motion.   No spine tenderness, full ROM X 4, no pelvic or hip tenderness   Neurological: She is alert and oriented to person, place, and time. She has normal strength and normal reflexes.   Skin: Skin is warm and dry.   5 cm skin tear left lateral elbow   Psychiatric: She has a normal mood and affect. Her behavior is normal. Thought content normal.         ED Course   Lac Repair  Date/Time: 12/4/2018 8:54 AM  Performed by: Shiva Avendano NP  Authorized by: Rush Johnson Jr., MD   Comments: Wound cleansed with betadine and approximated with tissue  glue    Splint Application  Date/Time: 12/4/2018 8:55 AM  Performed by: Shiva Avendano NP  Authorized by: Rush Johnson Jr., MD   Comments: ACE bandage applied to left elbow: alignment good, neurovascular status intact        Labs Reviewed - No data to display       Imaging Results          X-Ray Elbow Complete Left (Final result)  Result time 12/04/18 09:31:45    Final result by JENSEN Valerio Sr., MD (12/04/18 09:31:45)                 Impression:      Normal study.      Electronically signed by: Brian Valerio MD  Date:    12/04/2018  Time:    09:31             Narrative:    EXAMINATION:  XR ELBOW COMPLETE 3 VIEW LEFT    CLINICAL HISTORY:  Pain in unspecified elbow    COMPARISON:  None    FINDINGS:  There is no fracture. There is no dislocation.                                                      Clinical Impression:   The primary encounter diagnosis was Skin tear of elbow without complication, initial encounter. A diagnosis of Elbow pain was also pertinent to this visit.                             Shiva Avendano NP  12/04/18 0942       Shiva Avendano NP  12/04/18 0942

## 2018-12-04 NOTE — ED NOTES
Report called to Andrez at [facility]. Nurse updated on patients status. Awaiting return call for transportation set up.

## 2018-12-19 ENCOUNTER — HOSPITAL ENCOUNTER (EMERGENCY)
Facility: HOSPITAL | Age: 83
Discharge: HOME | End: 2018-12-19
Attending: EMERGENCY MEDICINE
Payer: MEDICARE

## 2018-12-19 VITALS
HEIGHT: 64 IN | BODY MASS INDEX: 29.62 KG/M2 | SYSTOLIC BLOOD PRESSURE: 142 MMHG | OXYGEN SATURATION: 95 % | TEMPERATURE: 99 F | WEIGHT: 173.5 LBS | RESPIRATION RATE: 22 BRPM | DIASTOLIC BLOOD PRESSURE: 81 MMHG | HEART RATE: 60 BPM

## 2018-12-19 DIAGNOSIS — R06.02 SHORTNESS OF BREATH: ICD-10-CM

## 2018-12-19 DIAGNOSIS — J18.9 PNEUMONIA OF LEFT LOWER LOBE DUE TO INFECTIOUS ORGANISM: Primary | ICD-10-CM

## 2018-12-19 DIAGNOSIS — H91.93 BILATERAL HEARING LOSS, UNSPECIFIED HEARING LOSS TYPE: ICD-10-CM

## 2018-12-19 LAB
ALBUMIN SERPL BCP-MCNC: 2.9 G/DL
ALP SERPL-CCNC: 117 U/L
ALT SERPL W/O P-5'-P-CCNC: 16 U/L
ANION GAP SERPL CALC-SCNC: 12 MMOL/L
APTT BLDCRRT: 27.6 SEC
AST SERPL-CCNC: 32 U/L
BASOPHILS # BLD AUTO: 0.01 K/UL
BASOPHILS NFR BLD: 0.1 %
BILIRUB SERPL-MCNC: 0.4 MG/DL
BNP SERPL-MCNC: 472 PG/ML
BUN SERPL-MCNC: 32 MG/DL
CALCIUM SERPL-MCNC: 8.8 MG/DL
CHLORIDE SERPL-SCNC: 105 MMOL/L
CO2 SERPL-SCNC: 22 MMOL/L
CREAT SERPL-MCNC: 1.5 MG/DL
DIFFERENTIAL METHOD: ABNORMAL
EOSINOPHIL # BLD AUTO: 0 K/UL
EOSINOPHIL NFR BLD: 0.2 %
ERYTHROCYTE [DISTWIDTH] IN BLOOD BY AUTOMATED COUNT: 13 %
EST. GFR  (AFRICAN AMERICAN): 36 ML/MIN/1.73 M^2
EST. GFR  (NON AFRICAN AMERICAN): 32 ML/MIN/1.73 M^2
GLUCOSE SERPL-MCNC: 142 MG/DL
HCT VFR BLD AUTO: 42.9 %
HGB BLD-MCNC: 14.1 G/DL
INR PPP: 0.9
LYMPHOCYTES # BLD AUTO: 0.4 K/UL
LYMPHOCYTES NFR BLD: 3.1 %
MCH RBC QN AUTO: 35.2 PG
MCHC RBC AUTO-ENTMCNC: 32.9 G/DL
MCV RBC AUTO: 107 FL
MONOCYTES # BLD AUTO: 0.7 K/UL
MONOCYTES NFR BLD: 5.3 %
NEUTROPHILS # BLD AUTO: 12.9 K/UL
NEUTROPHILS NFR BLD: 91.3 %
PLATELET # BLD AUTO: 198 K/UL
PMV BLD AUTO: 10.3 FL
POTASSIUM SERPL-SCNC: 4.3 MMOL/L
PROT SERPL-MCNC: 6.6 G/DL
PROTHROMBIN TIME: 10.3 SEC
RBC # BLD AUTO: 4.01 M/UL
SODIUM SERPL-SCNC: 139 MMOL/L
TROPONIN I SERPL DL<=0.01 NG/ML-MCNC: 0.01 NG/ML
WBC # BLD AUTO: 14.08 K/UL

## 2018-12-19 PROCEDURE — 85730 THROMBOPLASTIN TIME PARTIAL: CPT | Mod: HCNC

## 2018-12-19 PROCEDURE — 25000242 PHARM REV CODE 250 ALT 637 W/ HCPCS: Mod: HCNC | Performed by: EMERGENCY MEDICINE

## 2018-12-19 PROCEDURE — 99285 EMERGENCY DEPT VISIT HI MDM: CPT | Mod: 25,HCNC

## 2018-12-19 PROCEDURE — 93010 ELECTROCARDIOGRAM REPORT: CPT | Mod: HCNC,,, | Performed by: INTERNAL MEDICINE

## 2018-12-19 PROCEDURE — 36415 COLL VENOUS BLD VENIPUNCTURE: CPT | Mod: HCNC

## 2018-12-19 PROCEDURE — 85025 COMPLETE CBC W/AUTO DIFF WBC: CPT | Mod: HCNC

## 2018-12-19 PROCEDURE — 94640 AIRWAY INHALATION TREATMENT: CPT | Mod: HCNC

## 2018-12-19 PROCEDURE — 84484 ASSAY OF TROPONIN QUANT: CPT | Mod: HCNC

## 2018-12-19 PROCEDURE — 25000003 PHARM REV CODE 250: Mod: HCNC | Performed by: EMERGENCY MEDICINE

## 2018-12-19 PROCEDURE — 85610 PROTHROMBIN TIME: CPT | Mod: HCNC

## 2018-12-19 PROCEDURE — 93005 ELECTROCARDIOGRAM TRACING: CPT | Mod: HCNC

## 2018-12-19 PROCEDURE — 83880 ASSAY OF NATRIURETIC PEPTIDE: CPT | Mod: HCNC

## 2018-12-19 PROCEDURE — 80053 COMPREHEN METABOLIC PANEL: CPT | Mod: HCNC

## 2018-12-19 RX ORDER — LEVOFLOXACIN 750 MG/1
750 TABLET ORAL DAILY
Qty: 7 TABLET | Refills: 0 | Status: SHIPPED | OUTPATIENT
Start: 2018-12-19 | End: 2018-12-26

## 2018-12-19 RX ORDER — ALBUTEROL SULFATE 90 UG/1
1-2 AEROSOL, METERED RESPIRATORY (INHALATION) EVERY 6 HOURS PRN
Qty: 1 INHALER | Refills: 0 | Status: SHIPPED | OUTPATIENT
Start: 2018-12-19

## 2018-12-19 RX ORDER — ALBUTEROL SULFATE 90 UG/1
1-2 AEROSOL, METERED RESPIRATORY (INHALATION) EVERY 6 HOURS PRN
Qty: 1 INHALER | Refills: 0 | Status: SHIPPED | OUTPATIENT
Start: 2018-12-19 | End: 2018-12-19 | Stop reason: SDUPTHER

## 2018-12-19 RX ORDER — DULOXETIN HYDROCHLORIDE 20 MG/1
20 CAPSULE, DELAYED RELEASE ORAL DAILY
COMMUNITY

## 2018-12-19 RX ORDER — POTASSIUM CHLORIDE 20 MEQ/1
20 TABLET, EXTENDED RELEASE ORAL 2 TIMES DAILY
COMMUNITY

## 2018-12-19 RX ORDER — LEVOFLOXACIN 750 MG/1
750 TABLET ORAL DAILY
Qty: 7 TABLET | Refills: 0 | Status: SHIPPED | OUTPATIENT
Start: 2018-12-19 | End: 2018-12-19 | Stop reason: SDUPTHER

## 2018-12-19 RX ORDER — LEVOFLOXACIN 500 MG/1
500 TABLET, FILM COATED ORAL
Status: COMPLETED | OUTPATIENT
Start: 2018-12-19 | End: 2018-12-19

## 2018-12-19 RX ORDER — IPRATROPIUM BROMIDE AND ALBUTEROL SULFATE 2.5; .5 MG/3ML; MG/3ML
3 SOLUTION RESPIRATORY (INHALATION)
Status: COMPLETED | OUTPATIENT
Start: 2018-12-19 | End: 2018-12-19

## 2018-12-19 RX ADMIN — IPRATROPIUM BROMIDE AND ALBUTEROL SULFATE 3 ML: .5; 3 SOLUTION RESPIRATORY (INHALATION) at 08:12

## 2018-12-19 RX ADMIN — LEVOFLOXACIN 500 MG: 500 TABLET, FILM COATED ORAL at 08:12

## 2018-12-19 NOTE — ED NOTES
Contacted [facility] for instruction as to where pt prescriptions need to be sent, per Dominic request. Spoke with patients nurse at nursing home and was instructed to send paper copies home with her. Amanda notified.

## 2018-12-19 NOTE — ED NOTES
Pt c/o SOB and congestion with a cough. Patient daughter states the nursing home called stating the patient appeared SOB.    Patient moved to ED room 3, patient assisted onto stretcher and changed into a gown. Patient placed on cardiac monitor, continuous pulse oximetry and automatic blood pressure cuff. Bed placed in low locked position, side rails up x 2, call light is within reach of patient or family, orientation to room and explanation of wait provided to family and patient, alarms set and turned on for monitor and pulse ox, awaiting MD evaluation and orders, will continue to monitor.    Patient identifies self as Diana Turner      LOC: The patient is awake, alert and aware of environment with an appropriate affect, the patient is oriented x 3 and speaking appropriately.  APPEARANCE: Patient resting comfortably and in no acute distress, patient is clean and well groomed, patient's clothing is properly fastened.  SKIN: The skin is warm and dry, color consistent with ethnicity, patient has normal skin turgor and moist mucus membranes, skin intact, bruising noted to upper and lower extremities. Patient reports that her skin is easily bruised.  MUSCULOSKELETAL: Patient moving all extremities well, no obvious swelling or deformities noted.  RESPIRATORY: Airway is open and patent, respirations are spontaneous, patient has a increase effort and rate, no accessory muscle use noted. Audible wheezing with inhalation and exhalation. Crackles heard in LLL.  CARDIAC: Patient has a normal rate with a paced rhythm, trace periphreal edema noted, capillary refill < 3 seconds.  ABDOMEN: Soft and non tender to palpation, no distention noted.  NEUROLOGIC: PERRL, eyes open spontaneously, behavior appropriate to situation, follows commands, facial expression symmetrical, bilateral hand grasp equal and even, purposeful motor response noted, normal sensation in all extremities when touched with a finger.

## 2018-12-19 NOTE — ED NOTES
Contacted [facility] and spoke with Arely to arrange for patient transport back to facility. She was unable to give me an ETA at this time but said she will call me back shortly with an ETA.

## 2018-12-19 NOTE — ED PROVIDER NOTES
"SCRIBE #1 NOTE: I, Corinne Mack, am scribing for, and in the presence of, Josephine Patel MD. I have scribed the entire note.      History      Chief Complaint   Patient presents with    Shortness of Breath     pt from [facility] with reports of shortness of breath       Review of patient's allergies indicates:   Allergen Reactions    Pravastatin Other (See Comments)    Sulfa (sulfonamide antibiotics) Swelling and Other (See Comments)    Yeast, dried Other (See Comments)        HPI   HPI    12/19/2018, 7:30 AM   History obtained from the patient      History of Present Illness: Diana Turner is a 85 y.o. female patient with PMHx of anemia, pacemaker, CKD, HTN, and CHF who was sent to the Emergency Department from [facility] for further evaluation of pt SOB which onset gradually this morning. Pt's daughter states that the pt's NH called her this morning and told her that the pt was SOB, he O2 sats had dropped to the 80s, and that the pt "sounded wet" so the pt's daughter had the pt brought to the ED. When asked, pt states she does not feel SOB currently, but does c/o a cough. Symptoms are intermittent and moderate in severity. No mitigating or exacerbating factors reported. No associated sxs reported. Patient denies any fever, chills, congestion, rhinorrhea, sore throat, CP, N/V/D, abd pain, back pain, HA, dizziness, and all other sxs at this time. No prior Tx reported. No further complaints or concerns at this time.         Arrival mode: AASI    PCP: Cierra Woods DO       Past Medical History:  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.11)     Cardiac pacemaker 10/10/2013    Carotid artery occlusion     Chronic renal insufficiency, stage III (moderate) 10/10/2013    Depression     History of hyperparathyroidism     " Hyperlipidemia     Hypertension     Hypomagnesemia 4/19/2018    Kidney cysts     right US renal artery 9/2013    Obesity     Orbital mass     Pneumonia     as a child, required hospitalization    Right bundle branch block     Sinoatrial node dysfunction     Thyroid disease     goiter    Urinary incontinence        Past Surgical History:  Past Surgical History:   Procedure Laterality Date    ADENOIDECTOMY      APPENDECTOMY      CHOLECYSTECTOMY      HEMORRHOID SURGERY      HYSTERECTOMY      RAHUL; benign reasons    INSERT / REPLACE / REMOVE PACEMAKER  09/25/2008    [employer]    KNEE SURGERY      right    REPLACEMENT-GENERATOR-PACEMAKER Left 8/21/2017    Performed by Lup[facility]herty [facility] at [facility] CATH LAB    TONSILLECTOMY           Family History:  Family History   Problem Relation Age of Onset    Hypertension Mother     Kidney disease Mother     Heart disease Brother     Hyperlipidemia Brother     Hypertension Brother     Diabetes Cousin     Stroke Maternal Grandmother     Cancer Neg Hx     COPD Neg Hx        Social History:  Social History     Tobacco Use    Smoking status: Never Smoker    Smokeless tobacco: Never Used   Substance and Sexual Activity    Alcohol use: No    Drug use: No    Sexual activity: No     Partners: Male     Birth control/protection: See Surgical Hx       ROS   Review of Systems   Constitutional: Negative for chills and fever.   HENT: Negative for congestion, rhinorrhea and sore throat.    Respiratory: Positive for cough and shortness of breath (not currently).    Cardiovascular: Negative for chest pain and leg swelling.   Gastrointestinal: Negative for abdominal pain, diarrhea, nausea and vomiting.   Musculoskeletal: Negative for back pain, neck pain and neck stiffness.   Skin: Negative for rash and wound.   Neurological: Negative for dizziness, light-headedness, numbness and headaches.   All other systems reviewed and are negative.    Physical Exam      Initial  "Vitals [12/19/18 0652]   BP Pulse Resp Temp SpO2   138/72 83 (!) 22 99 °F (37.2 °C) 98 %      MAP       --          Physical Exam  Nursing Notes and Vital Signs Reviewed.  Constitutional: Patient is in no acute distress. Well-developed and well-nourished.  Head: Atraumatic. Normocephalic.  Eyes: PERRL. EOM intact. Conjunctivae are not pale. No scleral icterus.  ENT: Mucous membranes are moist. Oropharynx is clear and symmetric.  Hard of hearing.  Neck: Supple. Full ROM. No lymphadenopathy.  Cardiovascular: Regular rate. Regular rhythm. No murmurs, rubs, or gallops. Distal pulses are 2+ and symmetric.  Pulmonary/Chest: No respiratory distress. Crackles noted, more so on the R. No wheezing.  Abdominal: Soft and non-distended.  There is no tenderness.  No rebound, guarding, or rigidity.   Musculoskeletal: Moves all extremities. No obvious deformities. No edema. No calf tenderness.  Skin: Warm and dry.  Neurological:  Alert, awake, and appropriate.  Normal speech.  No acute focal neurological deficits are appreciated.  Psychiatric: Normal affect. Good eye contact. Appropriate in content.    ED Course    Procedures  ED Vital Signs:  Vitals:    12/19/18 0652 12/19/18 0702 12/19/18 0710 12/19/18 0716   BP: 138/72  136/61    Pulse: 83 83 70 61   Resp: (!) 22  (!) 22    Temp: 99 °F (37.2 °C)      TempSrc: Oral      SpO2: 98%  (!) 92%    Weight:       Height:        12/19/18 0718 12/19/18 0824 12/19/18 0842 12/19/18 0908   BP:   136/74 138/79   Pulse:  60 64 60   Resp:  (!) 28 (!) 22 (!) 22   Temp:       TempSrc:       SpO2:  (!) 93% 97% 95%   Weight: 78.7 kg (173 lb 8 oz)      Height: 5' 4" (1.626 m)       12/19/18 0958   BP: (!) 142/81   Pulse: 60   Resp: (!) 22   Temp:    TempSrc:    SpO2: 95%   Weight:    Height:        Abnormal Lab Results:  Labs Reviewed   CBC W/ AUTO DIFFERENTIAL - Abnormal; Notable for the following components:       Result Value    WBC 14.08 (*)      (*)     MCH 35.2 (*)     Gran # (ANC) 12.9 " (*)     Lymph # 0.4 (*)     Gran% 91.3 (*)     Lymph% 3.1 (*)     All other components within normal limits   COMPREHENSIVE METABOLIC PANEL - Abnormal; Notable for the following components:    CO2 22 (*)     Glucose 142 (*)     BUN, Bld 32 (*)     Creatinine 1.5 (*)     Albumin 2.9 (*)     eGFR if  36 (*)     eGFR if non  32 (*)     All other components within normal limits   B-TYPE NATRIURETIC PEPTIDE - Abnormal; Notable for the following components:     (*)     All other components within normal limits   TROPONIN I   PROTIME-INR   APTT        All Lab Results:  Results for orders placed or performed during the hospital encounter of 12/19/18   CBC auto differential   Result Value Ref Range    WBC 14.08 (H) 3.90 - 12.70 K/uL    RBC 4.01 4.00 - 5.40 M/uL    Hemoglobin 14.1 12.0 - 16.0 g/dL    Hematocrit 42.9 37.0 - 48.5 %     (H) 82 - 98 fL    MCH 35.2 (H) 27.0 - 31.0 pg    MCHC 32.9 32.0 - 36.0 g/dL    RDW 13.0 11.5 - 14.5 %    Platelets 198 150 - 350 K/uL    MPV 10.3 9.2 - 12.9 fL    Gran # (ANC) 12.9 (H) 1.8 - 7.7 K/uL    Lymph # 0.4 (L) 1.0 - 4.8 K/uL    Mono # 0.7 0.3 - 1.0 K/uL    Eos # 0.0 0.0 - 0.5 K/uL    Baso # 0.01 0.00 - 0.20 K/uL    Gran% 91.3 (H) 38.0 - 73.0 %    Lymph% 3.1 (L) 18.0 - 48.0 %    Mono% 5.3 4.0 - 15.0 %    Eosinophil% 0.2 0.0 - 8.0 %    Basophil% 0.1 0.0 - 1.9 %    Differential Method Automated    Comprehensive metabolic panel   Result Value Ref Range    Sodium 139 136 - 145 mmol/L    Potassium 4.3 3.5 - 5.1 mmol/L    Chloride 105 95 - 110 mmol/L    CO2 22 (L) 23 - 29 mmol/L    Glucose 142 (H) 70 - 110 mg/dL    BUN, Bld 32 (H) 8 - 23 mg/dL    Creatinine 1.5 (H) 0.5 - 1.4 mg/dL    Calcium 8.8 8.7 - 10.5 mg/dL    Total Protein 6.6 6.0 - 8.4 g/dL    Albumin 2.9 (L) 3.5 - 5.2 g/dL    Total Bilirubin 0.4 0.1 - 1.0 mg/dL    Alkaline Phosphatase 117 55 - 135 U/L    AST 32 10 - 40 U/L    ALT 16 10 - 44 U/L    Anion Gap 12 8 - 16 mmol/L    eGFR if   36 (A) >60 mL/min/1.73 m^2    eGFR if non African American 32 (A) >60 mL/min/1.73 m^2   Troponin I #1   Result Value Ref Range    Troponin I 0.015 0.000 - 0.026 ng/mL   B-Type natriuretic peptide (BNP)   Result Value Ref Range     (H) 0 - 99 pg/mL   Protime-INR   Result Value Ref Range    Prothrombin Time 10.3 9.0 - 12.5 sec    INR 0.9 0.8 - 1.2   APTT   Result Value Ref Range    aPTT 27.6 21.0 - 32.0 sec       Imaging Results:  Imaging Results          X-Ray Chest AP Portable (Final result)  Result time 12/19/18 07:40:09    Final result by JENSEN Valerio Sr., MD (12/19/18 07:40:09)                 Impression:      1. There is a subtle amount of haziness in the lower 3rd of the left hemithorax.  This is characteristic of atelectasis or subtle pneumonia.  2. There is mild cardiomegaly. There has been interval improvement in appearance of the right lung.  3. There is mild cardiomegaly.  4. A cardiac pacemaker remains in place. The leads appear to be intact.  .      Electronically signed by: Brian Valerio MD  Date:    12/19/2018  Time:    07:40             Narrative:    EXAMINATION:  XR CHEST AP PORTABLE    CLINICAL HISTORY:  Chest Pain;    COMPARISON:  04/20/2018    FINDINGS:  A cardiac pacemaker remains in place.  The leads appear to be intact.  There is mild cardiomegaly.  There has been interval improvement in appearance of the right lung.  There is no focal pulmonary infiltrate visualized in the right lung.  There is a subtle amount of haziness in the lower 3rd of the left hemithorax.  There is no pneumothorax.  The costophrenic angles are sharp.                                 The EKG was ordered, reviewed, and independently interpreted by the ED provider.  Interpretation time: 0702  Rate: 83 BPM  Rhythm: AV dual-paced rhythm  Interpretation: Normal QRS. No STEMI.             The Emergency Provider reviewed the vital signs and test results, which are outlined above.    ED Discussion      8:19 AM: Re-evaluated pt. Pt is resting comfortably and is in no acute distress.  D/w pt all pertinent results. Will give pt Levaquin and reevaluate her afterwards. D/w pt any concerns expressed at this time. Answered all questions. Pt expresses understanding at this time.    8:52 AM: Reassessed pt at this time. Pt is awake, alert, and in no distress. Discussed with pt all pertinent ED information and results. Discussed pt dx and plan of tx. Gave pt all f/u and return to the ED instructions. All questions and concerns were addressed at this time. Pt expresses understanding of information and instructions, and is comfortable with plan to discharge. Pt is stable for discharge back to [facility].      ED Medication(s):  Medications   albuterol-ipratropium 2.5 mg-0.5 mg/3 mL nebulizer solution 3 mL (3 mLs Nebulization Given 12/19/18 0824)   levoFLOXacin tablet 500 mg (500 mg Oral Given 12/19/18 0827)          Medication List      START taking these medications    albuterol 90 mcg/actuation inhaler  Commonly known as:  PROVENTIL/VENTOLIN HFA  Inhale 1-2 puffs into the lungs every 6 (six) hours as needed for Wheezing or Shortness of Breath. Rescue     levoFLOXacin 750 MG tablet  Commonly known as:  LEVAQUIN  Take 1 tablet (750 mg total) by mouth once daily. for 7 days        ASK your doctor about these medications    acetaminophen 500 MG tablet  Commonly known as:  TYLENOL     allopurinol 100 MG tablet  Commonly known as:  ZYLOPRIM  TAKE 2 TABLETS BY MOUTH ONCE DAILY.     aspirin 81 mg Cpdr     atenolol 100 MG tablet  Commonly known as:  TENORMIN  TAKE 1 TABLET BY MOUTH EVERY DAY     calcitRIOL 0.25 MCG Cap  Commonly known as:  ROCALTROL  TAKE 1 CAPSULE (0.25 MCG TOTAL) BY MOUTH ONCE DAILY.     diltiaZEM 240 MG 24 hr capsule  Commonly known as:  CARDIZEM CD  TAKE 1 CAPSULE BY MOUTH ONCE DAILY.     DULoxetine 20 MG capsule  Commonly known as:  CYMBALTA     furosemide 40 MG tablet  Commonly known as:  LASIX  TAKE  1 TABLET (40 MG TOTAL) BY MOUTH ONCE DAILY.     meclizine 25 mg tablet  Commonly known as:  ANTIVERT  TAKE 1 TABLET (25 MG TOTAL) BY MOUTH EVERY EVENING.     potassium chloride SA 20 MEQ tablet  Commonly known as:  K-DUR,KLOR-CON     senna 8.6 mg tablet  Commonly known as:  SENOKOT     traZODone 50 MG tablet  Commonly known as:  DESYREL           Where to Get Your Medications      You can get these medications from any pharmacy    Bring a paper prescription for each of these medications  · albuterol 90 mcg/actuation inhaler  · levoFLOXacin 750 MG tablet         Follow-up Information     Cierra Woods DO. Schedule an appointment as soon as possible for a visit in 2 days.    Specialty:  Internal Medicine  Why:  Return to the Emergency Room, If symptoms worsen  Contact information:  [address] [place] [address] [address] [address]  [phone]                     Medical Decision Making    Medical Decision Making:   Clinical Tests:   Lab Tests: Ordered and Reviewed  Radiological Study: Ordered and Reviewed  Medical Tests: Ordered and Reviewed           Scribe Attestation:   Scribe #1: I performed the above scribed service and the documentation accurately describes the services I performed. I attest to the accuracy of the note 12/19/2018 8:53 AM.    Attending:   Physician Attestation Statement for Scribe #1: I, Josephine Patel MD, personally performed the services described in this documentation, as scribed by Corinne Mack, in my presence, and it is both accurate and complete.          Clinical Impression       ICD-10-CM ICD-9-CM   1. Pneumonia of left lower lobe due to infectious organism J18.1 486   2. Shortness of breath R06.02 786.05   3. Bilateral hearing loss, unspecified hearing loss type H91.93 389.9       Disposition:   Disposition: Discharged  Condition: Stable           Josephine Patel MD  12/19/18 [id number]

## 2018-12-20 ENCOUNTER — OUTPATIENT CASE MANAGEMENT (OUTPATIENT)
Dept: ADMINISTRATIVE | Facility: OTHER | Age: 83
End: 2018-12-20

## 2018-12-20 ENCOUNTER — PES CALL (OUTPATIENT)
Dept: ADMINISTRATIVE | Facility: CLINIC | Age: 83
End: 2018-12-20

## 2018-12-20 NOTE — PROGRESS NOTES
The following patient has been assigned to Anya Thomas RN with Outpatient Complex Care Management for high risk screening.    Reason: High Risk : Recently discharged Report    Please contact [facility] at [phone] with any questions.    Thank you,    Gwen Magana, [facility]  Outpatient Care Mgmt.  [phone]

## 2018-12-20 NOTE — PROGRESS NOTES
--Chart reviewed, noted that patient currently resides in nursing home. Patient doesn't qualify for OPCM services at this time.

## 2019-05-09 ENCOUNTER — PATIENT OUTREACH (OUTPATIENT)
Dept: ADMINISTRATIVE | Facility: HOSPITAL | Age: 84
End: 2019-05-09

## 2019-05-09 NOTE — PROGRESS NOTES
Contacted patient to schedule annual pcp appointment. Spoke with patients daughter she stated that her mother is now in a nursing and receives care there

## 2019-07-30 ENCOUNTER — PATIENT MESSAGE (OUTPATIENT)
Dept: CARDIOLOGY | Facility: CLINIC | Age: 84
End: 2019-07-30

## 2019-08-01 DIAGNOSIS — Z86.79 HISTORY OF SICK SINUS SYNDROME: ICD-10-CM

## 2019-08-01 DIAGNOSIS — Z95.0 CARDIAC PACEMAKER IN SITU: Primary | ICD-10-CM

## 2019-08-05 ENCOUNTER — CLINICAL SUPPORT (OUTPATIENT)
Dept: CARDIOLOGY | Facility: CLINIC | Age: 84
End: 2019-08-05
Payer: MEDICARE

## 2019-08-05 DIAGNOSIS — Z86.79 HISTORY OF SICK SINUS SYNDROME: ICD-10-CM

## 2019-08-05 DIAGNOSIS — Z95.0 CARDIAC PACEMAKER IN SITU: ICD-10-CM

## 2019-08-05 PROCEDURE — 93280 PM DEVICE PROGR EVAL DUAL: CPT | Mod: HCNC,S$GLB,, | Performed by: INTERNAL MEDICINE

## 2019-08-05 PROCEDURE — 93280 PACEMAKER PROGRAMMING: ICD-10-PCS | Mod: HCNC,S$GLB,, | Performed by: INTERNAL MEDICINE

## 2019-08-07 ENCOUNTER — TELEPHONE (OUTPATIENT)
Dept: CARDIOLOGY | Facility: CLINIC | Age: 84
End: 2019-08-07

## 2019-08-07 NOTE — TELEPHONE ENCOUNTER
Spoke with daughter, advised that Dr. Browne agreed to patient only having remote pacemaker checks.  Pt does not need to follow-up in clinic at this time.  Daughter verbalized understanding, will make sure nursing home staff knows how to transmit with [facility] monitor.

## 2019-08-07 NOTE — TELEPHONE ENCOUNTER
----- Message from Woody Browne MD sent at 8/6/2019 10:48 AM CDT -----  Regarding: RE: remote pacer checks  sure  ----- Message -----  From: Sherita Mack RN  Sent: 8/6/2019   8:19 AM  To: Woody Browne MD  Subject: remote pacer checks                              Pt came to clinic yesterday for annual [employer] pacer check accompanied by daughter.  Daughter wants to know if we can do remote checks only as pt has very limited mobility and now lives at [facility].  Please advise.

## 2019-11-04 ENCOUNTER — TELEPHONE (OUTPATIENT)
Dept: CARDIOLOGY | Facility: CLINIC | Age: 84
End: 2019-11-04

## 2019-11-04 NOTE — TELEPHONE ENCOUNTER
----- Message from Triny Yu sent at 11/4/2019  8:31 AM CST -----  Contact: Daughter  Request a call concerning the pt 11/05/2019 appt, no additional info given and can be reached at [phone]///[email or web address]

## 2019-11-04 NOTE — TELEPHONE ENCOUNTER
Pt notified that she does not have an appointment in clinic tomorrow. She has an at home device check.

## 2020-01-12 DIAGNOSIS — Z95.0 CARDIAC PACEMAKER IN SITU: Primary | ICD-10-CM

## 2020-01-12 DIAGNOSIS — I49.5 SINOATRIAL NODE DYSFUNCTION: ICD-10-CM

## 2020-01-12 DIAGNOSIS — Z86.79 HISTORY OF SICK SINUS SYNDROME: ICD-10-CM

## 2020-01-28 ENCOUNTER — TELEPHONE (OUTPATIENT)
Dept: ADMINISTRATIVE | Facility: HOSPITAL | Age: 85
End: 2020-01-28

## 2020-01-28 DIAGNOSIS — Z86.79 HISTORY OF SICK SINUS SYNDROME: ICD-10-CM

## 2020-01-28 DIAGNOSIS — Z95.0 CARDIAC PACEMAKER IN SITU: Primary | ICD-10-CM

## 2020-01-28 NOTE — TELEPHONE ENCOUNTER
Pt's daughter (Yuni) states the pt is in a Nursing home and will not be seeing a PCP at [facility] at this time. lw

## 2020-01-31 ENCOUNTER — PATIENT MESSAGE (OUTPATIENT)
Dept: CARDIOLOGY | Facility: CLINIC | Age: 85
End: 2020-01-31

## 2020-02-04 ENCOUNTER — CLINICAL SUPPORT (OUTPATIENT)
Dept: CARDIOLOGY | Facility: CLINIC | Age: 85
End: 2020-02-04
Payer: MEDICARE

## 2020-02-04 DIAGNOSIS — I49.5 SINOATRIAL NODE DYSFUNCTION: ICD-10-CM

## 2020-02-04 DIAGNOSIS — Z86.79 HISTORY OF SICK SINUS SYNDROME: ICD-10-CM

## 2020-02-04 DIAGNOSIS — Z95.0 CARDIAC PACEMAKER IN SITU: ICD-10-CM

## 2020-02-04 PROCEDURE — 93294 REM INTERROG EVL PM/LDLS PM: CPT | Mod: HCNC,S$GLB,, | Performed by: INTERNAL MEDICINE

## 2020-02-04 PROCEDURE — 93294 PACEMAKER REMOTE: ICD-10-PCS | Mod: HCNC,S$GLB,, | Performed by: INTERNAL MEDICINE

## 2020-02-04 PROCEDURE — 93296 REM INTERROG EVL PM/IDS: CPT | Mod: HCNC,S$GLB,, | Performed by: INTERNAL MEDICINE

## 2020-02-04 PROCEDURE — 93296 PACEMAKER REMOTE: ICD-10-PCS | Mod: HCNC,S$GLB,, | Performed by: INTERNAL MEDICINE

## 2020-02-05 DIAGNOSIS — I49.5 SINOATRIAL NODE DYSFUNCTION: ICD-10-CM

## 2020-02-05 DIAGNOSIS — Z86.79 HISTORY OF SICK SINUS SYNDROME: ICD-10-CM

## 2020-02-05 DIAGNOSIS — Z95.0 CARDIAC PACEMAKER IN SITU: Primary | ICD-10-CM

## 2020-11-12 NOTE — PLAN OF CARE
LOCET completed. PASRR and 142 uploaded in [employer]   Isotretinoin Pregnancy And Lactation Text: This medication is Pregnancy Category X and is considered extremely dangerous during pregnancy. It is unknown if it is excreted in breast milk. Ilumya Counseling: I discussed with the patient the risks of tildrakizumab including but not limited to immunosuppression, malignancy, posterior leukoencephalopathy syndrome, and serious infections.  The patient understands that monitoring is required including a PPD at baseline and must alert us or the primary physician if symptoms of infection or other concerning signs are noted. Detail Level: Generalized Imiquimod Counseling:  I discussed with the patient the risks of imiquimod including but not limited to erythema, scaling, itching, weeping, crusting, and pain.  Patient understands that the inflammatory response to imiquimod is variable from person to person and was educated regarded proper titration schedule.  If flu-like symptoms develop, patient knows to discontinue the medication and contact us. Clindamycin Counseling: I counseled the patient regarding use of clindamycin as an antibiotic for prophylactic and/or therapeutic purposes. Clindamycin is active against numerous classes of bacteria, including skin bacteria. Side effects may include nausea, diarrhea, gastrointestinal upset, rash, hives, yeast infections, and in rare cases, colitis. Xeljanz Counseling: I discussed with the patient the risks of Xeljanz therapy including increased risk of infection, liver issues, headache, diarrhea, or cold symptoms. Live vaccines should be avoided. They were instructed to call if they have any problems. Nsaids Counseling: NSAID Counseling: I discussed with the patient that NSAIDs should be taken with food. Prolonged use of NSAIDs can result in the development of stomach ulcers.  Patient advised to stop taking NSAIDs if abdominal pain occurs.  The patient verbalized understanding of the proper use and possible adverse effects of NSAIDs.  All of the patient's questions and concerns were addressed. Xelkalyanz Pregnancy And Lactation Text: This medication is Pregnancy Category D and is not considered safe during pregnancy.  The risk during breast feeding is also uncertain. Imiquimod Pregnancy And Lactation Text: This medication is Pregnancy Category C. It is unknown if this medication is excreted in breast milk. Albendazole Counseling:  I discussed with the patient the risks of albendazole including but not limited to cytopenia, kidney damage, nausea/vomiting and severe allergy.  The patient understands that this medication is being used in an off-label manner. Use Enhanced Medication Counseling?: No Clindamycin Pregnancy And Lactation Text: This medication can be used in pregnancy if certain situations. Clindamycin is also present in breast milk. Nsaids Pregnancy And Lactation Text: These medications are considered safe up to 30 weeks gestation. It is excreted in breast milk. Fluconazole Counseling:  Patient counseled regarding adverse effects of fluconazole including but not limited to headache, diarrhea, nausea, upset stomach, liver function test abnormalities, taste disturbance, and stomach pain.  There is a rare possibility of liver failure that can occur when taking fluconazole.  The patient understands that monitoring of LFTs and kidney function test may be required, especially at baseline. The patient verbalized understanding of the proper use and possible adverse effects of fluconazole.  All of the patient's questions and concerns were addressed. High Dose Vitamin A Counseling: Side effects reviewed, pt to contact office should one occur. Fluconazole Pregnancy And Lactation Text: This medication is Pregnancy Category C and it isn't know if it is safe during pregnancy. It is also excreted in breast milk. High Dose Vitamin A Pregnancy And Lactation Text: High dose vitamin A therapy is contraindicated during pregnancy and breast feeding. Odomzo Counseling- I discussed with the patient the risks of Odomzo including but not limited to nausea, vomiting, diarrhea, constipation, weight loss, changes in the sense of taste, decreased appetite, muscle spasms, and hair loss.  The patient verbalized understanding of the proper use and possible adverse effects of Odomzo.  All of the patient's questions and concerns were addressed. Ilumya Pregnancy And Lactation Text: The risk during pregnancy and breastfeeding is uncertain with this medication. Albendazole Pregnancy And Lactation Text: This medication is Pregnancy Category C and it isn't known if it is safe during pregnancy. It is also excreted in breast milk. Minoxidil Counseling: Minoxidil is a topical medication which can increase blood flow where it is applied. It is uncertain how this medication increases hair growth. Side effects are uncommon and include stinging and allergic reactions. Infliximab Counseling:  I discussed with the patient the risks of infliximab including but not limited to myelosuppression, immunosuppression, autoimmune hepatitis, demyelinating diseases, lymphoma, and serious infections.  The patient understands that monitoring is required including a PPD at baseline and must alert us or the primary physician if symptoms of infection or other concerning signs are noted. Doxycycline Counseling:  Patient counseled regarding possible photosensitivity and increased risk for sunburn.  Patient instructed to avoid sunlight, if possible.  When exposed to sunlight, patients should wear protective clothing, sunglasses, and sunscreen.  The patient was instructed to call the office immediately if the following severe adverse effects occur:  hearing changes, easy bruising/bleeding, severe headache, or vision changes.  The patient verbalized understanding of the proper use and possible adverse effects of doxycycline.  All of the patient's questions and concerns were addressed. Xolair Counseling:  Patient informed of potential adverse effects including but not limited to fever, muscle aches, rash and allergic reactions.  The patient verbalized understanding of the proper use and possible adverse effects of Xolair.  All of the patient's questions and concerns were addressed. Arava Counseling:  Patient counseled regarding adverse effects of Arava including but not limited to nausea, vomiting, abnormalities in liver function tests. Patients may develop mouth sores, rash, diarrhea, and abnormalities in blood counts. The patient understands that monitoring is required including LFTs and blood counts.  There is a rare possibility of scarring of the liver and lung problems that can occur when taking methotrexate. Persistent nausea, loss of appetite, pale stools, dark urine, cough, and shortness of breath should be reported immediately. Patient advised to discontinue Arava treatment and consult with a physician prior to attempting conception. The patient will have to undergo a treatment to eliminate Arava from the body prior to conception. Xolair Pregnancy And Lactation Text: This medication is Pregnancy Category B and is considered safe during pregnancy. This medication is excreted in breast milk. Ivermectin Counseling:  Patient instructed to take medication on an empty stomach with a full glass of water.  Patient informed of potential adverse effects including but not limited to nausea, diarrhea, dizziness, itching, and swelling of the extremities or lymph nodes.  The patient verbalized understanding of the proper use and possible adverse effects of ivermectin.  All of the patient's questions and concerns were addressed. Griseofulvin Counseling:  I discussed with the patient the risks of griseofulvin including but not limited to photosensitivity, cytopenia, liver damage, nausea/vomiting and severe allergy.  The patient understands that this medication is best absorbed when taken with a fatty meal (e.g., ice cream or french fries). Minoxidil Pregnancy And Lactation Text: This medication has not been assigned a Pregnancy Risk Category but animal studies failed to show danger with the topical medication. It is unknown if the medication is excreted in breast milk. Odomzo Pregnancy And Lactation Text: This medication is Pregnancy Category X and is absolutely contraindicated during pregnancy. It is unknown if it is excreted in breast milk. Picato Counseling:  I discussed with the patient the risks of Picato including but not limited to erythema, scaling, itching, weeping, crusting, and pain. Infliximab Pregnancy And Lactation Text: This medication is Pregnancy Category B and is considered safe during pregnancy. It is unknown if this medication is excreted in breast milk. Cellcept Pregnancy And Lactation Text: This medication is Pregnancy Category D and isn't considered safe during pregnancy. It is unknown if this medication is excreted in breast milk. Doxycycline Pregnancy And Lactation Text: This medication is Pregnancy Category D and not consider safe during pregnancy. It is also excreted in breast milk but is considered safe for shorter treatment courses. Clofazimine Counseling:  I discussed with the patient the risks of clofazimine including but not limited to skin and eye pigmentation, liver damage, nausea/vomiting, gastrointestinal bleeding and allergy. Erythromycin Counseling:  I discussed with the patient the risks of erythromycin including but not limited to GI upset, allergic reaction, drug rash, diarrhea, increase in liver enzymes, and yeast infections. Cyclophosphamide Counseling:  I discussed with the patient the risks of cyclophosphamide including but not limited to hair loss, hormonal abnormalities, decreased fertility, abdominal pain, diarrhea, nausea and vomiting, bone marrow suppression and infection. The patient understands that monitoring is required while taking this medication. Otezla Counseling: The side effects of Otezla were discussed with the patient, including but not limited to worsening or new depression, weight loss, diarrhea, nausea, upper respiratory tract infection, and headache. Patient instructed to call the office should any adverse effect occur.  The patient verbalized understanding of the proper use and possible adverse effects of Otezla.  All the patient's questions and concerns were addressed. Benzoyl Peroxide Counseling: Patient counseled that medicine may cause skin irritation and bleach clothing.  In the event of skin irritation, the patient was advised to reduce the amount of the drug applied or use it less frequently.   The patient verbalized understanding of the proper use and possible adverse effects of benzoyl peroxide.  All of the patient's questions and concerns were addressed. Griseofulvin Pregnancy And Lactation Text: This medication is Pregnancy Category X and is known to cause serious birth defects. It is unknown if this medication is excreted in breast milk but breast feeding should be avoided. Benzoyl Peroxide Pregnancy And Lactation Text: This medication is Pregnancy Category C. It is unknown if benzoyl peroxide is excreted in breast milk. Rituxan Counseling:  I discussed with the patient the risks of Rituxan infusions. Side effects can include infusion reactions, severe drug rashes including mucocutaneous reactions, reactivation of latent hepatitis and other infections and rarely progressive multifocal leukoencephalopathy.  All of the patient's questions and concerns were addressed. Cyclophosphamide Pregnancy And Lactation Text: This medication is Pregnancy Category D and it isn't considered safe during pregnancy. This medication is excreted in breast milk. Rituxan Pregnancy And Lactation Text: This medication is Pregnancy Category C and it isn't know if it is safe during pregnancy. It is unknown if this medication is excreted in breast milk but similar antibodies are known to be excreted. Otezla Pregnancy And Lactation Text: This medication is Pregnancy Category C and it isn't known if it is safe during pregnancy. It is unknown if it is excreted in breast milk. Itraconazole Counseling:  I discussed with the patient the risks of itraconazole including but not limited to liver damage, nausea/vomiting, neuropathy, and severe allergy.  The patient understands that this medication is best absorbed when taken with acidic beverages such as non-diet cola or ginger ale.  The patient understands that monitoring is required including baseline LFTs and repeat LFTs at intervals.  The patient understands that they are to contact us or the primary physician if concerning signs are noted. Azathioprine Counseling:  I discussed with the patient the risks of azathioprine including but not limited to myelosuppression, immunosuppression, hepatotoxicity, lymphoma, and infections.  The patient understands that monitoring is required including baseline LFTs, Creatinine, possible TPMP genotyping and weekly CBCs for the first month and then every 2 weeks thereafter.  The patient verbalized understanding of the proper use and possible adverse effects of azathioprine.  All of the patient's questions and concerns were addressed. Erythromycin Pregnancy And Lactation Text: This medication is Pregnancy Category B and is considered safe during pregnancy. It is also excreted in breast milk. Protopic Counseling: Patient may experience a mild burning sensation during topical application. Protopic is not approved in children less than 2 years of age. There have been case reports of hematologic and skin malignancies in patients using topical calcineurin inhibitors although causality is questionable. Clofazimine Pregnancy And Lactation Text: This medication is Pregnancy Category C and isn't considered safe during pregnancy. It is excreted in breast milk. Siliq Counseling:  I discussed with the patient the risks of Siliq including but not limited to new or worsening depression, suicidal thoughts and behavior, immunosuppression, malignancy, posterior leukoencephalopathy syndrome, and serious infections.  The patient understands that monitoring is required including a PPD at baseline and must alert us or the primary physician if symptoms of infection or other concerning signs are noted. There is also a special program designed to monitor depression which is required with Siliq. Colchicine Counseling:  Patient counseled regarding adverse effects including but not limited to stomach upset (nausea, vomiting, stomach pain, or diarrhea).  Patient instructed to limit alcohol consumption while taking this medication.  Colchicine may reduce blood counts especially with prolonged use.  The patient understands that monitoring of kidney function and blood counts may be required, especially at baseline. The patient verbalized understanding of the proper use and possible adverse effects of colchicine.  All of the patient's questions and concerns were addressed. Oxybutynin Counseling:  I discussed with the patient the risks of oxybutynin including but not limited to skin rash, drowsiness, dry mouth, difficulty urinating, and blurred vision. Carac Counseling:  I discussed with the patient the risks of Carac including but not limited to erythema, scaling, itching, weeping, crusting, and pain. Metronidazole Counseling:  I discussed with the patient the risks of metronidazole including but not limited to seizures, nausea/vomiting, a metallic taste in the mouth, nausea/vomiting and severe allergy. Ketoconazole Counseling:   Patient counseled regarding improving absorption with orange juice.  Adverse effects include but are not limited to breast enlargement, headache, diarrhea, nausea, upset stomach, liver function test abnormalities, taste disturbance, and stomach pain.  There is a rare possibility of liver failure that can occur when taking ketoconazole. The patient understands that monitoring of LFTs may be required, especially at baseline. The patient verbalized understanding of the proper use and possible adverse effects of ketoconazole.  All of the patient's questions and concerns were addressed. Cellcept Counseling:  I discussed with the patient the risks of mycophenolate mofetil including but not limited to infection/immunosuppression, GI upset, hypokalemia, hypercholesterolemia, bone marrow suppression, lymphoproliferative disorders, malignancy, GI ulceration/bleed/perforation, colitis, interstitial lung disease, kidney failure, progressive multifocal leukoencephalopathy, and birth defects.  The patient understands that monitoring is required including a baseline creatinine and regular CBC testing. In addition, patient must alert us immediately if symptoms of infection or other concerning signs are noted. Oxybutynin Pregnancy And Lactation Text: This medication is Pregnancy Category B and is considered safe during pregnancy. It is unknown if it is excreted in breast milk. Cyclosporine Counseling:  I discussed with the patient the risks of cyclosporine including but not limited to hypertension, gingival hyperplasia,myelosuppression, immunosuppression, liver damage, kidney damage, neurotoxicity, lymphoma, and serious infections. The patient understands that monitoring is required including baseline blood pressure, CBC, CMP, lipid panel and uric acid, and then 1-2 times monthly CMP and blood pressure. Protopic Pregnancy And Lactation Text: This medication is Pregnancy Category C. It is unknown if this medication is excreted in breast milk when applied topically. Cyclosporine Pregnancy And Lactation Text: This medication is Pregnancy Category C and it isn't know if it is safe during pregnancy. This medication is excreted in breast milk. Metronidazole Pregnancy And Lactation Text: This medication is Pregnancy Category B and considered safe during pregnancy.  It is also excreted in breast milk. Carac Pregnancy And Lactation Text: This medication is Pregnancy Category X and contraindicated in pregnancy and in women who may become pregnant. It is unknown if this medication is excreted in breast milk. 5-Fu Counseling: 5-Fluorouracil Counseling:  I discussed with the patient the risks of 5-fluorouracil including but not limited to erythema, scaling, itching, weeping, crusting, and pain. Simponi Counseling:  I discussed with the patient the risks of golimumab including but not limited to myelosuppression, immunosuppression, autoimmune hepatitis, demyelinating diseases, lymphoma, and serious infections.  The patient understands that monitoring is required including a PPD at baseline and must alert us or the primary physician if symptoms of infection or other concerning signs are noted. Ketoconazole Pregnancy And Lactation Text: This medication is Pregnancy Category C and it isn't know if it is safe during pregnancy. It is also excreted in breast milk and breast feeding isn't recommended. Solaraze Counseling:  I discussed with the patient the risks of Solaraze including but not limited to erythema, scaling, itching, weeping, crusting, and pain. Birth Control Pills Counseling: Birth Control Pill Counseling: I discussed with the patient the potential side effects of OCPs including but not limited to increased risk of stroke, heart attack, thrombophlebitis, deep venous thrombosis, hepatic adenomas, breast changes, GI upset, headaches, and depression.  The patient verbalized understanding of the proper use and possible adverse effects of OCPs. All of the patient's questions and concerns were addressed. Solaraze Pregnancy And Lactation Text: This medication is Pregnancy Category B and is considered safe. There is some data to suggest avoiding during the third trimester. It is unknown if this medication is excreted in breast milk. Cimzia Counseling:  I discussed with the patient the risks of Cimzia including but not limited to immunosuppression, allergic reactions and infections.  The patient understands that monitoring is required including a PPD at baseline and must alert us or the primary physician if symptoms of infection or other concerning signs are noted. Dapsone Counseling: I discussed with the patient the risks of dapsone including but not limited to hemolytic anemia, agranulocytosis, rashes, methemoglobinemia, kidney failure, peripheral neuropathy, headaches, GI upset, and liver toxicity.  Patients who start dapsone require monitoring including baseline LFTs and weekly CBCs for the first month, then every month thereafter.  The patient verbalized understanding of the proper use and possible adverse effects of dapsone.  All of the patient's questions and concerns were addressed. Methotrexate Counseling:  Patient counseled regarding adverse effects of methotrexate including but not limited to nausea, vomiting, abnormalities in liver function tests. Patients may develop mouth sores, rash, diarrhea, and abnormalities in blood counts. The patient understands that monitoring is required including LFT's and blood counts.  There is a rare possibility of scarring of the liver and lung problems that can occur when taking methotrexate. Persistent nausea, loss of appetite, pale stools, dark urine, cough, and shortness of breath should be reported immediately. Patient advised to discontinue methotrexate treatment at least three months before attempting to become pregnant.  I discussed the need for folate supplements while taking methotrexate.  These supplements can decrease side effects during methotrexate treatment. The patient verbalized understanding of the proper use and possible adverse effects of methotrexate.  All of the patient's questions and concerns were addressed. Minocycline Counseling: Patient advised regarding possible photosensitivity and discoloration of the teeth, skin, lips, tongue and gums.  Patient instructed to avoid sunlight, if possible.  When exposed to sunlight, patients should wear protective clothing, sunglasses, and sunscreen.  The patient was instructed to call the office immediately if the following severe adverse effects occur:  hearing changes, easy bruising/bleeding, severe headache, or vision changes.  The patient verbalized understanding of the proper use and possible adverse effects of minocycline.  All of the patient's questions and concerns were addressed. Minocycline Pregnancy And Lactation Text: This medication is Pregnancy Category D and not consider safe during pregnancy. It is also excreted in breast milk. Methotrexate Pregnancy And Lactation Text: This medication is Pregnancy Category X and is known to cause fetal harm. This medication is excreted in breast milk. Dapsone Pregnancy And Lactation Text: This medication is Pregnancy Category C and is not considered safe during pregnancy or breast feeding. Birth Control Pills Pregnancy And Lactation Text: This medication should be avoided if pregnant and for the first 30 days post-partum. Terbinafine Counseling: Patient counseling regarding adverse effects of terbinafine including but not limited to headache, diarrhea, rash, upset stomach, liver function test abnormalities, itching, taste/smell disturbance, nausea, abdominal pain, and flatulence.  There is a rare possibility of liver failure that can occur when taking terbinafine.  The patient understands that a baseline LFT and kidney function test may be required. The patient verbalized understanding of the proper use and possible adverse effects of terbinafine.  All of the patient's questions and concerns were addressed. Terbinafine Pregnancy And Lactation Text: This medication is Pregnancy Category B and is considered safe during pregnancy. It is also excreted in breast milk and breast feeding isn't recommended. Spironolactone Counseling: Patient advised regarding risks of diarrhea, abdominal pain, hyperkalemia, birth defects (for female patients), liver toxicity and renal toxicity. The patient may need blood work to monitor liver and kidney function and potassium levels while on therapy. The patient verbalized understanding of the proper use and possible adverse effects of spironolactone.  All of the patient's questions and concerns were addressed. Topical Retinoid counseling:  Patient advised to apply a pea-sized amount only at bedtime and wait 30 minutes after washing their face before applying.  If too drying, patient may add a non-comedogenic moisturizer. The patient verbalized understanding of the proper use and possible adverse effects of retinoids.  All of the patient's questions and concerns were addressed. Cimzia Pregnancy And Lactation Text: This medication crosses the placenta but can be considered safe in certain situations. Cimzia may be excreted in breast milk. Skyrizi Counseling: I discussed with the patient the risks of risankizumab-rzaa including but not limited to immunosuppression, and serious infections.  The patient understands that monitoring is required including a PPD at baseline and must alert us or the primary physician if symptoms of infection or other concerning signs are noted. Prednisone Counseling:  I discussed with the patient the risks of prolonged use of prednisone including but not limited to weight gain, insomnia, osteoporosis, mood changes, diabetes, susceptibility to infection, glaucoma and high blood pressure.  In cases where prednisone use is prolonged, patients should be monitored with blood pressure checks, serum glucose levels and an eye exam.  Additionally, the patient may need to be placed on GI prophylaxis, PCP prophylaxis, and calcium and vitamin D supplementation and/or a bisphosphonate.  The patient verbalized understanding of the proper use and the possible adverse effects of prednisone.  All of the patient's questions and concerns were addressed. Cosentyx Counseling:  I discussed with the patient the risks of Cosentyx including but not limited to worsening of Crohn's disease, immunosuppression, allergic reactions and infections.  The patient understands that monitoring is required including a PPD at baseline and must alert us or the primary physician if symptoms of infection or other concerning signs are noted. Quinolones Counseling:  I discussed with the patient the risks of fluoroquinolones including but not limited to GI upset, allergic reaction, drug rash, diarrhea, dizziness, photosensitivity, yeast infections, liver function test abnormalities, tendonitis/tendon rupture. Drysol Counseling:  I discussed with the patient the risks of drysol/aluminum chloride including but not limited to skin rash, itching, irritation, burning. Erivedge Counseling- I discussed with the patient the risks of Erivedge including but not limited to nausea, vomiting, diarrhea, constipation, weight loss, changes in the sense of taste, decreased appetite, muscle spasms, and hair loss.  The patient verbalized understanding of the proper use and possible adverse effects of Erivedge.  All of the patient's questions and concerns were addressed. Drysol Pregnancy And Lactation Text: This medication is considered safe during pregnancy and breast feeding. Spironolactone Pregnancy And Lactation Text: This medication can cause feminization of the male fetus and should be avoided during pregnancy. The active metabolite is also found in breast milk. Tazorac Counseling:  Patient advised that medication is irritating and drying.  Patient may need to apply sparingly and wash off after an hour before eventually leaving it on overnight.  The patient verbalized understanding of the proper use and possible adverse effects of tazorac.  All of the patient's questions and concerns were addressed. Gabapentin Counseling: I discussed with the patient the risks of gabapentin including but not limited to dizziness, somnolence, fatigue and ataxia. Rifampin Counseling: I discussed with the patient the risks of rifampin including but not limited to liver damage, kidney damage, red-orange body fluids, nausea/vomiting and severe allergy. SSKI Counseling:  I discussed with the patient the risks of SSKI including but not limited to thyroid abnormalities, metallic taste, GI upset, fever, headache, acne, arthralgias, paraesthesias, lymphadenopathy, easy bleeding, arrhythmias, and allergic reaction. Cimetidine Counseling:  I discussed with the patient the risks of Cimetidine including but not limited to gynecomastia, headache, diarrhea, nausea, drowsiness, arrhythmias, pancreatitis, skin rashes, psychosis, bone marrow suppression and kidney toxicity. Elidel Counseling: Patient may experience a mild burning sensation during topical application. Elidel is not approved in children less than 2 years of age. There have been case reports of hematologic and skin malignancies in patients using topical calcineurin inhibitors although causality is questionable. Dupixent Counseling: I discussed with the patient the risks of dupilumab including but not limited to eye infection and irritation, cold sores, injection site reactions, worsening of asthma, allergic reactions and increased risk of parasitic infection.  Live vaccines should be avoided while taking dupilumab. Dupilumab will also interact with certain medications such as warfarin and cyclosporine. The patient understands that monitoring is required and they must alert us or the primary physician if symptoms of infection or other concerning signs are noted. Azithromycin Counseling:  I discussed with the patient the risks of azithromycin including but not limited to GI upset, allergic reaction, drug rash, diarrhea, and yeast infections. Stelara Counseling:  I discussed with the patient the risks of ustekinumab including but not limited to immunosuppression, malignancy, posterior leukoencephalopathy syndrome, and serious infections.  The patient understands that monitoring is required including a PPD at baseline and must alert us or the primary physician if symptoms of infection or other concerning signs are noted. Tazorac Pregnancy And Lactation Text: This medication is not safe during pregnancy. It is unknown if this medication is excreted in breast milk. Sski Pregnancy And Lactation Text: This medication is Pregnancy Category D and isn't considered safe during pregnancy. It is excreted in breast milk. Acitretin Counseling:  I discussed with the patient the risks of acitretin including but not limited to hair loss, dry lips/skin/eyes, liver damage, hyperlipidemia, depression/suicidal ideation, photosensitivity.  Serious rare side effects can include but are not limited to pancreatitis, pseudotumor cerebri, bony changes, clot formation/stroke/heart attack.  Patient understands that alcohol is contraindicated since it can result in liver toxicity and significantly prolong the elimination of the drug by many years. Rifampin Pregnancy And Lactation Text: This medication is Pregnancy Category C and it isn't know if it is safe during pregnancy. It is also excreted in breast milk and should not be used if you are breast feeding. Eucrisa Counseling: Patient may experience a mild burning sensation during topical application. Eucrisa is not approved in children less than 2 years of age. Dupixent Pregnancy And Lactation Text: This medication likely crosses the placenta but the risk for the fetus is uncertain. This medication is excreted in breast milk. Topical Clindamycin Counseling: Patient counseled that this medication may cause skin irritation or allergic reactions.  In the event of skin irritation, the patient was advised to reduce the amount of the drug applied or use it less frequently.   The patient verbalized understanding of the proper use and possible adverse effects of clindamycin.  All of the patient's questions and concerns were addressed. Azithromycin Pregnancy And Lactation Text: This medication is considered safe during pregnancy and is also secreted in breast milk. Sarecycline Counseling: Patient advised regarding possible photosensitivity and discoloration of the teeth, skin, lips, tongue and gums.  Patient instructed to avoid sunlight, if possible.  When exposed to sunlight, patients should wear protective clothing, sunglasses, and sunscreen.  The patient was instructed to call the office immediately if the following severe adverse effects occur:  hearing changes, easy bruising/bleeding, severe headache, or vision changes.  The patient verbalized understanding of the proper use and possible adverse effects of sarecycline.  All of the patient's questions and concerns were addressed. Taltz Counseling: I discussed with the patient the risks of ixekizumab including but not limited to immunosuppression, serious infections, worsening of inflammatory bowel disease and drug reactions.  The patient understands that monitoring is required including a PPD at baseline and must alert us or the primary physician if symptoms of infection or other concerning signs are noted. Glycopyrrolate Counseling:  I discussed with the patient the risks of glycopyrrolate including but not limited to skin rash, drowsiness, dry mouth, difficulty urinating, and blurred vision. Bactrim Counseling:  I discussed with the patient the risks of sulfa antibiotics including but not limited to GI upset, allergic reaction, drug rash, diarrhea, dizziness, photosensitivity, and yeast infections.  Rarely, more serious reactions can occur including but not limited to aplastic anemia, agranulocytosis, methemoglobinemia, blood dyscrasias, liver or kidney failure, lung infiltrates or desquamative/blistering drug rashes. Thalidomide Counseling: I discussed with the patient the risks of thalidomide including but not limited to birth defects, anxiety, weakness, chest pain, dizziness, cough and severe allergy. Doxepin Counseling:  Patient advised that the medication is sedating and not to drive a car after taking this medication. Patient informed of potential adverse effects including but not limited to dry mouth, urinary retention, and blurry vision.  The patient verbalized understanding of the proper use and possible adverse effects of doxepin.  All of the patient's questions and concerns were addressed. Acitretin Pregnancy And Lactation Text: This medication is Pregnancy Category X and should not be given to women who are pregnant or may become pregnant in the future. This medication is excreted in breast milk. Doxepin Pregnancy And Lactation Text: This medication is Pregnancy Category C and it isn't known if it is safe during pregnancy. It is also excreted in breast milk and breast feeding isn't recommended. Enbrel Counseling:  I discussed with the patient the risks of etanercept including but not limited to myelosuppression, immunosuppression, autoimmune hepatitis, demyelinating diseases, lymphoma, and infections.  The patient understands that monitoring is required including a PPD at baseline and must alert us or the primary physician if symptoms of infection or other concerning signs are noted. Bexarotene Counseling:  I discussed with the patient the risks of bexarotene including but not limited to hair loss, dry lips/skin/eyes, liver abnormalities, hyperlipidemia, pancreatitis, depression/suicidal ideation, photosensitivity, drug rash/allergic reactions, hypothyroidism, anemia, leukopenia, infection, cataracts, and teratogenicity.  Patient understands that they will need regular blood tests to check lipid profile, liver function tests, white blood cell count, thyroid function tests and pregnancy test if applicable. Bactrim Pregnancy And Lactation Text: This medication is Pregnancy Category D and is known to cause fetal risk.  It is also excreted in breast milk. Glycopyrrolate Pregnancy And Lactation Text: This medication is Pregnancy Category B and is considered safe during pregnancy. It is unknown if it is excreted breast milk. Tremfya Counseling: I discussed with the patient the risks of guselkumab including but not limited to immunosuppression, serious infections, worsening of inflammatory bowel disease and drug reactions.  The patient understands that monitoring is required including a PPD at baseline and must alert us or the primary physician if symptoms of infection or other concerning signs are noted. Hydroquinone Counseling:  Patient advised that medication may result in skin irritation, lightening (hypopigmentation), dryness, and burning.  In the event of skin irritation, the patient was advised to reduce the amount of the drug applied or use it less frequently.  Rarely, spots that are treated with hydroquinone can become darker (pseudoochronosis).  Should this occur, patient instructed to stop medication and call the office. The patient verbalized understanding of the proper use and possible adverse effects of hydroquinone.  All of the patient's questions and concerns were addressed. Cephalexin Counseling: I counseled the patient regarding use of cephalexin as an antibiotic for prophylactic and/or therapeutic purposes. Cephalexin (commonly prescribed under brand name Keflex) is a cephalosporin antibiotic which is active against numerous classes of bacteria, including most skin bacteria. Side effects may include nausea, diarrhea, gastrointestinal upset, rash, hives, yeast infections, and in rare cases, hepatitis, kidney disease, seizures, fever, confusion, neurologic symptoms, and others. Patients with severe allergies to penicillin medications are cautioned that there is about a 10% incidence of cross-reactivity with cephalosporins. When possible, patients with penicillin allergies should use alternatives to cephalosporins for antibiotic therapy. Valtrex Counseling: I discussed with the patient the risks of valacyclovir including but not limited to kidney damage, nausea, vomiting and severe allergy.  The patient understands that if the infection seems to be worsening or is not improving, they are to call. Hydroxyzine Counseling: Patient advised that the medication is sedating and not to drive a car after taking this medication.  Patient informed of potential adverse effects including but not limited to dry mouth, urinary retention, and blurry vision.  The patient verbalized understanding of the proper use and possible adverse effects of hydroxyzine.  All of the patient's questions and concerns were addressed. Topical Sulfur Applications Counseling: Topical Sulfur Counseling: Patient counseled that this medication may cause skin irritation or allergic reactions.  In the event of skin irritation, the patient was advised to reduce the amount of the drug applied or use it less frequently.   The patient verbalized understanding of the proper use and possible adverse effects of topical sulfur application.  All of the patient's questions and concerns were addressed. Topical Sulfur Applications Pregnancy And Lactation Text: This medication is Pregnancy Category C and has an unknown safety profile during pregnancy. It is unknown if this topical medication is excreted in breast milk. Humira Counseling:  I discussed with the patient the risks of adalimumab including but not limited to myelosuppression, immunosuppression, autoimmune hepatitis, demyelinating diseases, lymphoma, and serious infections.  The patient understands that monitoring is required including a PPD at baseline and must alert us or the primary physician if symptoms of infection or other concerning signs are noted. Hydroxychloroquine Counseling:  I discussed with the patient that a baseline ophthalmologic exam is needed at the start of therapy and every year thereafter while on therapy. A CBC may also be warranted for monitoring.  The side effects of this medication were discussed with the patient, including but not limited to agranulocytosis, aplastic anemia, seizures, rashes, retinopathy, and liver toxicity. Patient instructed to call the office should any adverse effect occur.  The patient verbalized understanding of the proper use and possible adverse effects of Plaquenil.  All the patient's questions and concerns were addressed. Tetracycline Counseling: Patient counseled regarding possible photosensitivity and increased risk for sunburn.  Patient instructed to avoid sunlight, if possible.  When exposed to sunlight, patients should wear protective clothing, sunglasses, and sunscreen.  The patient was instructed to call the office immediately if the following severe adverse effects occur:  hearing changes, easy bruising/bleeding, severe headache, or vision changes.  The patient verbalized understanding of the proper use and possible adverse effects of tetracycline.  All of the patient's questions and concerns were addressed. Patient understands to avoid pregnancy while on therapy due to potential birth defects. Bexarotene Pregnancy And Lactation Text: This medication is Pregnancy Category X and should not be given to women who are pregnant or may become pregnant. This medication should not be used if you are breast feeding. Cephalexin Pregnancy And Lactation Text: This medication is Pregnancy Category B and considered safe during pregnancy.  It is also excreted in breast milk but can be used safely for shorter doses. Hydroxychloroquine Pregnancy And Lactation Text: This medication has been shown to cause fetal harm but it isn't assigned a Pregnancy Risk Category. There are small amounts excreted in breast milk. Valtrex Pregnancy And Lactation Text: this medication is Pregnancy Category B and is considered safe during pregnancy. This medication is not directly found in breast milk but it's metabolite acyclovir is present. Hydroxyzine Pregnancy And Lactation Text: This medication is not safe during pregnancy and should not be taken. It is also excreted in breast milk and breast feeding isn't recommended. Isotretinoin Counseling: Patient should get monthly blood tests, not donate blood, not drive at night if vision affected, not share medication, and not undergo elective surgery for 6 months after tx completed. Side effects reviewed, pt to contact office should one occur. Zyclara Counseling:  I discussed with the patient the risks of imiquimod including but not limited to erythema, scaling, itching, weeping, crusting, and pain.  Patient understands that the inflammatory response to imiquimod is variable from person to person and was educated regarded proper titration schedule.  If flu-like symptoms develop, patient knows to discontinue the medication and contact us.

## 2021-02-24 NOTE — TELEPHONE ENCOUNTER
Notified Cornelio Denise RN of Asystole alarming on monitor. Strips printed and sent for review.   Please review. Pt still inpatient

## 2023-10-24 NOTE — PATIENT INSTRUCTIONS
Plan:  Lab work POCT UA, C&S   Advise increase p.o. fluids-- water/juice & rest  Practice good handwashing.  Advise follow up with PCP  Advise unscented soaps  Advise follow up with PCP  Advise go to ER if nausea, vomiting, fever, increased back pain, or fail to improve with treatment.  AVS provided and reviewed with patient including supportive care, follow up, and red flag symptoms.   Patient verbalizes understanding and agrees with treatment plan. Discharged from Urgent Care in stable condition.    
normal...